# Patient Record
Sex: MALE | Race: WHITE | Employment: UNEMPLOYED | URBAN - METROPOLITAN AREA
[De-identification: names, ages, dates, MRNs, and addresses within clinical notes are randomized per-mention and may not be internally consistent; named-entity substitution may affect disease eponyms.]

---

## 2017-01-09 RX ORDER — SIMVASTATIN 40 MG
TABLET ORAL
Qty: 15 TABLET | Refills: 2 | Status: SHIPPED | OUTPATIENT
Start: 2017-01-09 | End: 2017-06-10

## 2017-02-06 RX ORDER — DULOXETIN HYDROCHLORIDE 60 MG/1
CAPSULE, DELAYED RELEASE ORAL
Qty: 30 CAPSULE | Refills: 2 | Status: SHIPPED | OUTPATIENT
Start: 2017-02-06 | End: 2017-05-27

## 2017-02-06 NOTE — TELEPHONE ENCOUNTER
Last OV pertinent to medication: 9/20/16 for med check  Last refill date: 11/7/16     #/refills: #30 + 2   When pt was asked to return for OV: NA  Upcoming appt/reason: No future appt

## 2017-03-08 ENCOUNTER — OFFICE VISIT (OUTPATIENT)
Dept: INTERNAL MEDICINE CLINIC | Facility: CLINIC | Age: 57
End: 2017-03-08

## 2017-03-08 VITALS
WEIGHT: 315 LBS | DIASTOLIC BLOOD PRESSURE: 70 MMHG | HEIGHT: 71 IN | BODY MASS INDEX: 44.1 KG/M2 | OXYGEN SATURATION: 98 % | HEART RATE: 97 BPM | RESPIRATION RATE: 16 BRPM | SYSTOLIC BLOOD PRESSURE: 130 MMHG

## 2017-03-08 DIAGNOSIS — E66.01 MORBID OBESITY DUE TO EXCESS CALORIES (HCC): Primary | ICD-10-CM

## 2017-03-08 DIAGNOSIS — M17.0 PRIMARY OSTEOARTHRITIS OF BOTH KNEES: ICD-10-CM

## 2017-03-08 PROCEDURE — 99213 OFFICE O/P EST LOW 20 MIN: CPT | Performed by: INTERNAL MEDICINE

## 2017-03-08 NOTE — PROGRESS NOTES
Allan Sewell is a 61year old male.   HPI:   CC: knee pain ongoing  Cannot have bariatric surgery  till he loses weight  Using topical diclofenac with some relief  Walking with a cane      ALL:  Erythromycin                Comment:Pt states felt \"tingling hyperlipidemia    • Hypertension    • Morbid obesity ([facility] [address]    • Renal insufficiency, mild July 2010     Was hospitalized due to dehydration and had acute renal insufficiency (creatinine 1.85)   • Disc herniation Dx in 1/2012     L5-S1 disc herniation after murmur,  LUNGS: clear to auscultation, breathing nonlabored   GI: Soft+BS NT ND,no masses, no HSM, no abdominal bruit , no hernia,   MUSCULOSKELETAL:  no cyanosis, clubbing or edema b/l, 2+DP/PT pulse bilateral,+ tender right knee greater than left  [employer]

## 2017-03-11 RX ORDER — LISINOPRIL 2.5 MG/1
TABLET ORAL
Qty: 30 TABLET | Refills: 2 | Status: SHIPPED | OUTPATIENT
Start: 2017-03-11 | End: 2017-06-10

## 2017-03-11 RX ORDER — FUROSEMIDE 20 MG/1
TABLET ORAL
Qty: 30 TABLET | Refills: 3 | Status: SHIPPED | OUTPATIENT
Start: 2017-03-11 | End: 2017-10-07

## 2017-05-30 RX ORDER — DULOXETIN HYDROCHLORIDE 60 MG/1
CAPSULE, DELAYED RELEASE ORAL
Qty: 30 CAPSULE | Refills: 2 | Status: SHIPPED | OUTPATIENT
Start: 2017-05-30 | End: 2017-09-02

## 2017-05-30 NOTE — TELEPHONE ENCOUNTER
Last OV pertinent to medication: 3/8/17  Last refill date: 2/6/17     #/refills: 30 tabs + 2 refills   When pt was asked to return for OV: No mention when follow up is needed   Upcoming appt/reason: No upcoming appt

## 2017-06-12 RX ORDER — SIMVASTATIN 40 MG
TABLET ORAL
Qty: 15 TABLET | Refills: 1 | Status: SHIPPED | OUTPATIENT
Start: 2017-06-12 | End: 2017-10-25

## 2017-06-12 RX ORDER — LISINOPRIL 2.5 MG/1
TABLET ORAL
Qty: 30 TABLET | Refills: 1 | Status: SHIPPED | OUTPATIENT
Start: 2017-06-12 | End: 2017-09-02

## 2017-08-28 ENCOUNTER — TELEPHONE (OUTPATIENT)
Dept: INTERNAL MEDICINE CLINIC | Facility: CLINIC | Age: 57
End: 2017-08-28

## 2017-09-05 RX ORDER — LISINOPRIL 2.5 MG/1
TABLET ORAL
Qty: 30 TABLET | Refills: 0 | Status: SHIPPED | OUTPATIENT
Start: 2017-09-05 | End: 2017-10-07

## 2017-09-05 RX ORDER — ASPIRIN 81 MG/1
TABLET ORAL
Qty: 30 TABLET | Refills: 2 | Status: SHIPPED | OUTPATIENT
Start: 2017-09-05 | End: 2018-05-05

## 2017-09-05 RX ORDER — DULOXETIN HYDROCHLORIDE 60 MG/1
CAPSULE, DELAYED RELEASE ORAL
Qty: 30 CAPSULE | Refills: 1 | Status: SHIPPED | OUTPATIENT
Start: 2017-09-05 | End: 2017-11-04

## 2017-09-05 NOTE — TELEPHONE ENCOUNTER
Last OV pertinent to medication: 3/8/17  Last refill date: duloxetine:  5/30/17  30 caps + 2 refills              asprin:       10/24/16   30 tabs + 5 refills   When pt was asked to return for OV: no upcoming appt   Upcoming appt/reason: NO mention when fo

## 2017-09-22 ENCOUNTER — PATIENT OUTREACH (OUTPATIENT)
Dept: INTERNAL MEDICINE CLINIC | Facility: CLINIC | Age: 57
End: 2017-09-22

## 2017-09-22 NOTE — PROGRESS NOTES
Called patient, Left Message and verbalized that Patient is Overdue for a DM Eye Exam for 2016/2017. If Completed already to call back with info or to call when Exam is done.

## 2017-10-09 RX ORDER — FUROSEMIDE 20 MG/1
TABLET ORAL
Qty: 90 TABLET | Refills: 1 | Status: SHIPPED | OUTPATIENT
Start: 2017-10-09 | End: 2018-06-08

## 2017-10-09 RX ORDER — LISINOPRIL 2.5 MG/1
TABLET ORAL
Qty: 90 TABLET | Refills: 1 | Status: SHIPPED | OUTPATIENT
Start: 2017-10-09 | End: 2018-03-12

## 2017-10-09 NOTE — TELEPHONE ENCOUNTER
Last OV pertinent to medication: 03/08/17  Last refill date: 09/05/17; 03/11/17     #/refills: 30/0; 30/0  When pt was asked to return for OV: None noted  Upcoming appt/reason: None    Lab Results  Component Value Date    (H) 09/20/2016   BUN 27 (H)

## 2017-10-23 ENCOUNTER — MED REC SCAN ONLY (OUTPATIENT)
Dept: INTERNAL MEDICINE CLINIC | Facility: CLINIC | Age: 57
End: 2017-10-23

## 2017-10-26 RX ORDER — SIMVASTATIN 40 MG
TABLET ORAL
Qty: 15 TABLET | Refills: 0 | Status: SHIPPED | OUTPATIENT
Start: 2017-10-26 | End: 2017-12-02

## 2017-10-26 NOTE — TELEPHONE ENCOUNTER
Last OV relevant to medication: 3/8/17  Last refill date: 6/12/17     #/refills: 15/1  When pt was asked to return for OV: none noted  Upcoming appt/reason: none    Lab Results  Component Value Date    (H) 09/20/2016   BUN 27 (H) 09/20/2016   JOVAN

## 2017-10-27 NOTE — TELEPHONE ENCOUNTER
Last OV relevant to medication: 3/8/17  Last refill date: 8/7/17     #/refills: 400g/1  When pt was asked to return for OV: none noted  Upcoming appt/reason: none

## 2017-10-30 NOTE — TELEPHONE ENCOUNTER
Left message on patients answering machine to call back. Patient is due for an eye exam. Last eye exam documented is from 2013.

## 2017-11-01 ENCOUNTER — PATIENT MESSAGE (OUTPATIENT)
Dept: INTERNAL MEDICINE CLINIC | Facility: CLINIC | Age: 57
End: 2017-11-01

## 2017-11-01 NOTE — TELEPHONE ENCOUNTER
From: Melania Form  To: Paradise Horton DO  Sent: 11/1/2017 10:37 AM CDT  Subject: Prescription Question    [place] has sent over 3 requests to refill diclofenac Since last week.  Please fill the prescription  Thanks  Adela Bolden

## 2017-11-01 NOTE — TELEPHONE ENCOUNTER
Pt mycharted back and said he is having done before the end of year.   Has to check schedule and call for OV for eye exam.

## 2017-11-02 ENCOUNTER — PATIENT MESSAGE (OUTPATIENT)
Dept: INTERNAL MEDICINE CLINIC | Facility: CLINIC | Age: 57
End: 2017-11-02

## 2017-11-02 NOTE — TELEPHONE ENCOUNTER
From: Violeta Tello  To: Kolton Saldivar DO  Sent: 11/2/2017 10:43 AM CDT  Subject: Prescription Question    Still waiting on the diclofenac refill  Newt Birch

## 2017-11-06 RX ORDER — DULOXETIN HYDROCHLORIDE 60 MG/1
CAPSULE, DELAYED RELEASE ORAL
Qty: 30 CAPSULE | Refills: 1 | Status: SHIPPED | OUTPATIENT
Start: 2017-11-06 | End: 2018-01-08

## 2017-11-06 NOTE — TELEPHONE ENCOUNTER
Last OV relevant to medication: 3/8/17  Last refill date: 9/5/17     #/refills: 30/1  When pt was asked to return for OV: none noted  Upcoming appt/reason: none

## 2017-11-08 ENCOUNTER — TELEPHONE (OUTPATIENT)
Dept: INTERNAL MEDICINE CLINIC | Facility: CLINIC | Age: 57
End: 2017-11-08

## 2017-11-08 NOTE — TELEPHONE ENCOUNTER
Received fax  [employer] re:need to initiate Prior Auth for Dispensing Limit Override for Diclofenac. To Triage bin.

## 2017-11-09 NOTE — TELEPHONE ENCOUNTER
Initiated Prior Authorization for dispensing Limit Override fro Diclofenac---Key : [facility]. To Triage bin. Await response.

## 2017-11-11 ENCOUNTER — TELEPHONE (OUTPATIENT)
Dept: INTERNAL MEDICINE CLINIC | Facility: CLINIC | Age: 57
End: 2017-11-11

## 2017-11-11 NOTE — TELEPHONE ENCOUNTER
Incoming (mail or fax):   Fax  Received from:  [facility] - rejection information - Diclofenac Sodium 1% Gel  Documentation given to:  Triage

## 2017-11-13 ENCOUNTER — OFFICE VISIT (OUTPATIENT)
Dept: INTERNAL MEDICINE CLINIC | Facility: CLINIC | Age: 57
End: 2017-11-13

## 2017-11-13 ENCOUNTER — PATIENT MESSAGE (OUTPATIENT)
Dept: INTERNAL MEDICINE CLINIC | Facility: CLINIC | Age: 57
End: 2017-11-13

## 2017-11-13 VITALS
TEMPERATURE: 97 F | BODY MASS INDEX: 44.1 KG/M2 | SYSTOLIC BLOOD PRESSURE: 126 MMHG | HEART RATE: 80 BPM | DIASTOLIC BLOOD PRESSURE: 72 MMHG | WEIGHT: 315 LBS | HEIGHT: 71 IN | RESPIRATION RATE: 16 BRPM

## 2017-11-13 DIAGNOSIS — J01.00 ACUTE MAXILLARY SINUSITIS, RECURRENCE NOT SPECIFIED: Primary | ICD-10-CM

## 2017-11-13 PROCEDURE — 99213 OFFICE O/P EST LOW 20 MIN: CPT | Performed by: INTERNAL MEDICINE

## 2017-11-13 RX ORDER — LEVOFLOXACIN 500 MG/1
500 TABLET, FILM COATED ORAL DAILY
Qty: 7 TABLET | Refills: 0 | Status: SHIPPED | OUTPATIENT
Start: 2017-11-13 | End: 2017-12-18 | Stop reason: ALTCHOICE

## 2017-11-13 NOTE — TELEPHONE ENCOUNTER
Prior auth for diclofenac gel rejected by insurance. Max qty of 150.00 in 23 days. Do you want to change qty or rx?

## 2017-11-13 NOTE — TELEPHONE ENCOUNTER
Tubes only come in 100g size, cannot break up tube to dispense insurance's required qty. Pharmacist was able to change rx to dispense 100g for 10 day supply w/refills.

## 2017-11-13 NOTE — TELEPHONE ENCOUNTER
From: Violeta Tello  To: Kolton Saldivar DO  Sent: 11/13/2017 1:17 PM CST  Subject: Other    How were you able to get it authorized last year for the 400 grams per month ? the insurance had the same guidelines then. can you please check into this.  One tube does

## 2017-11-13 NOTE — TELEPHONE ENCOUNTER
Let pt know this  Not sure why rejected  He has been using this dose for few years  Please pend what is approved

## 2017-11-13 NOTE — TELEPHONE ENCOUNTER
Dharmesh Morley (Key: [facility])    Your information has been submitted to [employer]. [employer] is reviewing the PA request and you will receive an electronic response.  You may check for the updated outcome later by reopening this request. The standard fax de

## 2017-11-13 NOTE — TELEPHONE ENCOUNTER
From: Dina Meeks  To: Luanne Mckinnon DO  Sent: 11/13/2017 12:11 PM CST  Subject: Other    I just left your office. Was told I need to speak to you. Called [employer] today and they still have not received the authorization request for diclofenac.  Latricia Norton been kacy

## 2017-11-13 NOTE — TELEPHONE ENCOUNTER
Not at this time. I re-submitted to insurance (prior auth) since the qty is way too small for QID both knees. Will message you if need further assistance. Thanks!

## 2017-11-13 NOTE — PROGRESS NOTES
[facility]    CHIEF COMPLAINT:  Patient presents with:  Sinus Problem: sx for 1 week. Taking Mucinex and tylenol prn. Has appt for eye exam on 11/27/17.   Form given to pt.    has not had a colonoscopy        HISTORY OF PRESENT ILLNESS:  Compla ONETOUCH ULTRA BLUE In Vitro Strip USE TO TEST 4 TIMES A DAY AS DIRECTED Disp: 400 strip Rfl: 3   Diclofenac Sodium 1 % Transdermal Gel APPLY 4 GRAMS TO BOTH KNEES 4 TIMES A DAY AS NEEDED Disp: 400 g Rfl: 1       PAST MEDICAL, SOCIAL, AND FAMILY HISTORY:

## 2017-11-16 NOTE — TELEPHONE ENCOUNTER
Received fax from [employer] re: Diclofenac sodium gel 1 % has been approved, for quantity up to 400 g/month, granted 11/9/17 through 11/9/18. Case # [id number].

## 2017-11-16 NOTE — TELEPHONE ENCOUNTER
Prescription drug approval    Incoming (mail or fax):  fax  Received from:  [employer]  Documentation given to:  triage

## 2017-11-17 NOTE — TELEPHONE ENCOUNTER
Pt had communicated regarding this medication through [employer]. msg sent through [employer] on original email thread 11/13/17. Re-sent rx to pharmacy so it will go through insurance.

## 2017-12-04 RX ORDER — SIMVASTATIN 40 MG
TABLET ORAL
Qty: 15 TABLET | Refills: 0 | Status: SHIPPED | OUTPATIENT
Start: 2017-12-04 | End: 2018-03-10

## 2017-12-04 NOTE — TELEPHONE ENCOUNTER
Last OV relevant to medication: 09/20/2017  Last refill date: 10/26/2017     #/refills: 15/0  When pt was asked to return for OV: none noted  Upcoming appt/reason: none    Lab Results  Component Value Date   CHOLEST 145 09/20/2016   TRIG 127 09/20/2016   H

## 2017-12-07 PROCEDURE — 82570 ASSAY OF URINE CREATININE: CPT | Performed by: INTERNAL MEDICINE

## 2017-12-07 PROCEDURE — 82043 UR ALBUMIN QUANTITATIVE: CPT | Performed by: INTERNAL MEDICINE

## 2017-12-18 ENCOUNTER — OFFICE VISIT (OUTPATIENT)
Dept: INTERNAL MEDICINE CLINIC | Facility: CLINIC | Age: 57
End: 2017-12-18

## 2017-12-18 VITALS
HEIGHT: 71 IN | WEIGHT: 315 LBS | HEART RATE: 82 BPM | RESPIRATION RATE: 18 BRPM | SYSTOLIC BLOOD PRESSURE: 124 MMHG | BODY MASS INDEX: 44.1 KG/M2 | TEMPERATURE: 98 F | OXYGEN SATURATION: 99 % | DIASTOLIC BLOOD PRESSURE: 62 MMHG

## 2017-12-18 DIAGNOSIS — J01.90 ACUTE SINUSITIS, RECURRENCE NOT SPECIFIED, UNSPECIFIED LOCATION: Primary | ICD-10-CM

## 2017-12-18 PROCEDURE — 99213 OFFICE O/P EST LOW 20 MIN: CPT | Performed by: INTERNAL MEDICINE

## 2017-12-18 RX ORDER — LEVOFLOXACIN 500 MG/1
500 TABLET, FILM COATED ORAL DAILY
Qty: 10 TABLET | Refills: 0 | Status: SHIPPED | OUTPATIENT
Start: 2017-12-18 | End: 2018-01-03 | Stop reason: ALTCHOICE

## 2017-12-18 NOTE — PROGRESS NOTES
John Paul Espinal is a 61year old male  Patient presents with:  Nasal Congestion  Runny Nose  Cough  Sore Throat  Chest Congestion      HPI:   He had a sinus infection 1 month ago and was a lot worse and doesn't want this one to get that bad  URI for 1 week an Disp: 400 strip Rfl: 3      Patient Active Problem List:     Morbid obesity ([facility] [address]     Sleep Apnea  AHI 78     Scalp laceration     Disc herniation     Renal insufficiency, mild     Sleep apnea, obstructive     DJD (degenerative joint disease) of knee     A Resp 18   Ht 71\"   Wt (!) 436 lb   SpO2 99%   BMI 60.81 kg/m²   HEENT: atraumatic, normocephalic,ears and throat are clear, no pallor or icterus  NECK: supple,no adenopathy,no bruits, no TM  LUNGS: clear to auscultation and percussion      ASSESSMENT AND

## 2018-01-03 ENCOUNTER — HOSPITAL ENCOUNTER (EMERGENCY)
Facility: HOSPITAL | Age: 58
Discharge: HOME | End: 2018-01-03
Attending: EMERGENCY MEDICINE
Payer: COMMERCIAL

## 2018-01-03 ENCOUNTER — APPOINTMENT (OUTPATIENT)
Dept: CT IMAGING | Facility: HOSPITAL | Age: 58
End: 2018-01-03
Attending: EMERGENCY MEDICINE
Payer: COMMERCIAL

## 2018-01-03 VITALS
DIASTOLIC BLOOD PRESSURE: 68 MMHG | OXYGEN SATURATION: 98 % | HEIGHT: 71 IN | SYSTOLIC BLOOD PRESSURE: 128 MMHG | WEIGHT: 315 LBS | TEMPERATURE: 98 F | RESPIRATION RATE: 18 BRPM | BODY MASS INDEX: 44.1 KG/M2 | HEART RATE: 82 BPM

## 2018-01-03 DIAGNOSIS — S39.012A BACK STRAIN, INITIAL ENCOUNTER: Primary | ICD-10-CM

## 2018-01-03 LAB
ALBUMIN SERPL-MCNC: 3.7 G/DL (ref 3.5–4.8)
ALP LIVER SERPL-CCNC: 79 U/L (ref 45–117)
ALT SERPL-CCNC: 18 U/L (ref 17–63)
AST SERPL-CCNC: 19 U/L (ref 15–41)
BASOPHILS # BLD AUTO: 0.04 X10(3) UL (ref 0–0.1)
BASOPHILS NFR BLD AUTO: 0.5 %
BILIRUB SERPL-MCNC: 0.6 MG/DL (ref 0.1–2)
BILIRUB UR QL STRIP.AUTO: NEGATIVE
BUN BLD-MCNC: 29 MG/DL (ref 8–20)
CALCIUM BLD-MCNC: 9.7 MG/DL (ref 8.3–10.3)
CHLORIDE: 103 MMOL/L (ref 101–111)
CLARITY UR REFRACT.AUTO: CLEAR
CO2: 25 MMOL/L (ref 22–32)
CREAT BLD-MCNC: 1.14 MG/DL (ref 0.7–1.3)
EOSINOPHIL # BLD AUTO: 0.11 X10(3) UL (ref 0–0.3)
EOSINOPHIL NFR BLD AUTO: 1.3 %
ERYTHROCYTE [DISTWIDTH] IN BLOOD BY AUTOMATED COUNT: 13.3 % (ref 11.5–16)
GLUCOSE BLD-MCNC: 116 MG/DL (ref 70–99)
GLUCOSE BLD-MCNC: 84 MG/DL (ref 65–99)
GLUCOSE BLD-MCNC: 91 MG/DL (ref 65–99)
GLUCOSE UR STRIP.AUTO-MCNC: NEGATIVE MG/DL
HCT VFR BLD AUTO: 40.3 % (ref 37–53)
HGB BLD-MCNC: 13.6 G/DL (ref 13–17)
IMMATURE GRANULOCYTE COUNT: 0.04 X10(3) UL (ref 0–1)
IMMATURE GRANULOCYTE RATIO %: 0.5 %
KETONES UR STRIP.AUTO-MCNC: NEGATIVE MG/DL
LEUKOCYTE ESTERASE UR QL STRIP.AUTO: NEGATIVE
LYMPHOCYTES # BLD AUTO: 2.55 X10(3) UL (ref 0.9–4)
LYMPHOCYTES NFR BLD AUTO: 30.1 %
M PROTEIN MFR SERPL ELPH: 7.9 G/DL (ref 6.1–8.3)
MCH RBC QN AUTO: 29.1 PG (ref 27–33.2)
MCHC RBC AUTO-ENTMCNC: 33.7 G/DL (ref 31–37)
MCV RBC AUTO: 86.3 FL (ref 80–99)
MONOCYTES # BLD AUTO: 0.66 X10(3) UL (ref 0.1–0.6)
MONOCYTES NFR BLD AUTO: 7.8 %
NEUTROPHIL ABS PRELIM: 5.08 X10 (3) UL (ref 1.3–6.7)
NEUTROPHILS # BLD AUTO: 5.08 X10(3) UL (ref 1.3–6.7)
NEUTROPHILS NFR BLD AUTO: 59.8 %
NITRITE UR QL STRIP.AUTO: NEGATIVE
PH UR STRIP.AUTO: 7 [PH] (ref 4.5–8)
PLATELET # BLD AUTO: 283 10(3)UL (ref 150–450)
POTASSIUM SERPL-SCNC: 4.7 MMOL/L (ref 3.6–5.1)
PROT UR STRIP.AUTO-MCNC: NEGATIVE MG/DL
RBC # BLD AUTO: 4.67 X10(6)UL (ref 4.3–5.7)
RBC UR QL AUTO: NEGATIVE
RED CELL DISTRIBUTION WIDTH-SD: 41.4 FL (ref 35.1–46.3)
SODIUM SERPL-SCNC: 136 MMOL/L (ref 136–144)
SP GR UR STRIP.AUTO: 1.01 (ref 1–1.03)
UROBILINOGEN UR STRIP.AUTO-MCNC: <2 MG/DL
WBC # BLD AUTO: 8.5 X10(3) UL (ref 4–13)

## 2018-01-03 PROCEDURE — 96375 TX/PRO/DX INJ NEW DRUG ADDON: CPT

## 2018-01-03 PROCEDURE — 96361 HYDRATE IV INFUSION ADD-ON: CPT

## 2018-01-03 PROCEDURE — 99284 EMERGENCY DEPT VISIT MOD MDM: CPT

## 2018-01-03 PROCEDURE — 96374 THER/PROPH/DIAG INJ IV PUSH: CPT

## 2018-01-03 PROCEDURE — 81003 URINALYSIS AUTO W/O SCOPE: CPT | Performed by: EMERGENCY MEDICINE

## 2018-01-03 PROCEDURE — 74176 CT ABD & PELVIS W/O CONTRAST: CPT | Performed by: EMERGENCY MEDICINE

## 2018-01-03 PROCEDURE — 82962 GLUCOSE BLOOD TEST: CPT

## 2018-01-03 PROCEDURE — 85025 COMPLETE CBC W/AUTO DIFF WBC: CPT | Performed by: EMERGENCY MEDICINE

## 2018-01-03 PROCEDURE — 80053 COMPREHEN METABOLIC PANEL: CPT | Performed by: EMERGENCY MEDICINE

## 2018-01-03 RX ORDER — ONDANSETRON 2 MG/ML
4 INJECTION INTRAMUSCULAR; INTRAVENOUS ONCE
Status: COMPLETED | OUTPATIENT
Start: 2018-01-03 | End: 2018-01-03

## 2018-01-03 RX ORDER — TRAMADOL HYDROCHLORIDE 50 MG/1
50 TABLET ORAL EVERY 4 HOURS PRN
Qty: 20 TABLET | Refills: 0 | Status: SHIPPED | OUTPATIENT
Start: 2018-01-03 | End: 2018-01-10

## 2018-01-03 RX ORDER — HYDROMORPHONE HYDROCHLORIDE 1 MG/ML
1 INJECTION, SOLUTION INTRAMUSCULAR; INTRAVENOUS; SUBCUTANEOUS ONCE
Status: COMPLETED | OUTPATIENT
Start: 2018-01-03 | End: 2018-01-03

## 2018-01-03 RX ORDER — CYCLOBENZAPRINE HCL 10 MG
10 TABLET ORAL 3 TIMES DAILY PRN
Qty: 20 TABLET | Refills: 0 | Status: SHIPPED | OUTPATIENT
Start: 2018-01-03 | End: 2018-01-10

## 2018-01-03 NOTE — ED PROVIDER NOTES
Patient Seen in: [facility] Emergency Department    History   Patient presents with:  Back Pain (musculoskeletal)    Stated Complaint: back pain    HPI    This is a 21-year-old male who arrives here with complaints of back pain,. The patient.   The at age 35    Type 2 DM       Past Surgical History:  No date: OTHER SURGICAL HISTORY      Comment: Root canals        Smoking status: Never Smoker                                                              Smokeless tobacco: Never Used Glucose 116 (*)     BUN 29 (*)     All other components within normal limits   CBC W/ DIFFERENTIAL - Abnormal; Notable for the following:     Monocyte Absolute 0.66 (*)     All other components within normal limits   POCT GLUCOSE - Normal   POCT GLUCOSE There is a probable cyst arising from the medial upper pole of the right kidney. No evidence of hydronephrosis or renal stones present. ADRENALS:  No mass or enlargement. LIVER:  No enlargement, atrophy, abnormal density, or significant focal lesion.   BI close follow-up.       Disposition and Plan     Clinical Impression:  Back strain, initial encounter  (primary encounter diagnosis)    Disposition:  Discharge  1/3/2018  1:10 pm    Follow-up:  Romario Chambers DO  [address] [address]

## 2018-01-08 RX ORDER — DULOXETIN HYDROCHLORIDE 60 MG/1
CAPSULE, DELAYED RELEASE ORAL
Qty: 30 CAPSULE | Refills: 2 | Status: SHIPPED | OUTPATIENT
Start: 2018-01-08 | End: 2018-04-09

## 2018-01-08 NOTE — TELEPHONE ENCOUNTER
Last OV relevant to medication: 3/8/17, seen acutely multiple times since  Last refill date: 11/6/17     #/refills: 30/1  When pt was asked to return for OV: none noted  Upcoming appt/reason: none

## 2018-01-15 NOTE — TELEPHONE ENCOUNTER
Last OV relevant to medication: 9/20/2016 - Med cira  Last refill date: 11/17/2017     #/refills: 400g/1  When pt was asked to return for OV: none noted  Upcoming appt/reason: none

## 2018-02-20 NOTE — TELEPHONE ENCOUNTER
Medication(s) to Refill:   Pending Prescriptions Disp Refills    DICLOFENAC SODIUM 1 % Transdermal Gel [Pharmacy Med Name: Diclofenac Sodium Transdermal Gel 1 %] 400 g 0     Sig: apply 4 grams to both knees 4 times daily as needed           Last Time Medication was Filled:  1/15/18    Last Office Visit with PCP:  3/8/17    When Patient was Due Back to the Office:  (from when PCP last addressed medication)  [] 1 month,  [] 3 months,  [] 6 months,  [] 12 months,  [x] Other       Future Appointments  Date Time Provider Ritika Villalpando   3/8/2018 9:40 AM Fabio Loya MD [address]        [] Prescription needs to be printed at site and faxed to pharmacy  []Controlled Substance, prescription needs to be printed at the site, site to notify patient when ready  Unable to Refill per Protocol:   [] Office visit due (90 day supply prescription extension has already been granted)   [] Blood Pressure out of range   [] Labs Overdue  []  Medication has never been prescribed by [facility] provider   [x] Other     Last Blood Pressures:  BP Readings from Last 2 Encounters:  01/03/18 : 128/68  12/18/17 : 124/62

## 2018-03-12 RX ORDER — LISINOPRIL 2.5 MG/1
2.5 TABLET ORAL
Qty: 90 TABLET | Refills: 1 | Status: SHIPPED | OUTPATIENT
Start: 2018-03-12 | End: 2018-06-08

## 2018-03-12 RX ORDER — SIMVASTATIN 40 MG
TABLET ORAL
Qty: 15 TABLET | Refills: 1 | Status: SHIPPED | OUTPATIENT
Start: 2018-03-12 | End: 2018-06-08

## 2018-03-29 ENCOUNTER — TELEPHONE (OUTPATIENT)
Dept: INTERNAL MEDICINE CLINIC | Facility: CLINIC | Age: 58
End: 2018-03-29

## 2018-03-29 NOTE — TELEPHONE ENCOUNTER
Pt dropped off Parking Placard request form and is requesting renewal with completion of form and signature, and then mail to home. See Media 10/25/17. Last renewal done for 6 months. To consult folder.

## 2018-03-29 NOTE — TELEPHONE ENCOUNTER
Patient dropped off a disability form for his Parking/License Plates. I put in Triage bin to complete. He also wanted us to mail to his home when completed.     Thank you

## 2018-03-29 NOTE — TELEPHONE ENCOUNTER
Pt called, agreed to fee for form completion. Asks if we can mail form to him and bill him for form fee.  Thank you

## 2018-03-29 NOTE — TELEPHONE ENCOUNTER
Left message on pt's voice mail advising that there is a fee for form completion out side of an office visit. Pt asked to call back to confirm that he is agreeable to policy that a fee may be charged.

## 2018-03-29 NOTE — TELEPHONE ENCOUNTER
form completed   Please charge for form completion outside visit  Let pt know I made his disability permanent since he is not a surgical candidate  Please make copy and mail to his house  He needs to sign form

## 2018-03-30 NOTE — TELEPHONE ENCOUNTER
PSR - please charge fee for form completed outside of office visit. Form mailed to pt. Thanks! Copy sent to scan. Original mailed to pt's address on file with note advising of areas pt needs to complete and sign.     Left detailed msg, rajni per hipaa,

## 2018-04-09 ENCOUNTER — TELEPHONE (OUTPATIENT)
Dept: INTERNAL MEDICINE CLINIC | Facility: CLINIC | Age: 58
End: 2018-04-09

## 2018-04-10 RX ORDER — DULOXETIN HYDROCHLORIDE 60 MG/1
60 CAPSULE, DELAYED RELEASE ORAL
Qty: 30 CAPSULE | Refills: 0 | Status: SHIPPED | OUTPATIENT
Start: 2018-04-10 | End: 2018-05-05

## 2018-04-10 NOTE — TELEPHONE ENCOUNTER
Patient called, he is really busy at the moment and cannot schedule a physical at this time.  Please asked if we can fill this medication for a month for him, he said he will call back to schedule a physical. Please advise patient, either call or my chart,

## 2018-04-10 NOTE — TELEPHONE ENCOUNTER
Last OV relevant to medication: 3/8/17, seen acutely several times since  Last refill date: 1/8/18     #/refills: 30/2  When pt was asked to return for OV: 1 yr  Upcoming appt/reason: none, mychart message sent

## 2018-04-11 RX ORDER — DULOXETIN HYDROCHLORIDE 60 MG/1
60 CAPSULE, DELAYED RELEASE ORAL
Qty: 30 CAPSULE | Refills: 0 | OUTPATIENT
Start: 2018-04-11

## 2018-04-11 NOTE — TELEPHONE ENCOUNTER
Patient called back, advised he is due for a medication check, patient asked if RX was filled, advised a 30 day supply was sent to pharmacy yesterday, patient voiced understanding, will call back to schedule apt.

## 2018-04-11 NOTE — TELEPHONE ENCOUNTER
Noted below. Will keep in Phone Call pool to f/u with patient to make sure they schedule an appointment for a med check.

## 2018-05-07 RX ORDER — DULOXETIN HYDROCHLORIDE 60 MG/1
CAPSULE, DELAYED RELEASE ORAL
Qty: 30 CAPSULE | Refills: 0 | Status: SHIPPED | OUTPATIENT
Start: 2018-05-07 | End: 2018-06-08

## 2018-05-07 NOTE — TELEPHONE ENCOUNTER
Last OV relevant to medication: 3/8/17, seen acutely twice   Last refill date: 4/10/18     #/refills: 30/0  When pt was asked to return for OV: none noted   Upcoming appt/reason: none, pt was notified with last refill that he overdue for OV, mychart sent again today

## 2018-10-11 ENCOUNTER — TELEPHONE (OUTPATIENT)
Dept: INTERNAL MEDICINE CLINIC | Facility: CLINIC | Age: 58
End: 2018-10-11

## 2018-10-11 NOTE — TELEPHONE ENCOUNTER
Patient called regarding Phytel reminders he was receiving. Former patient of Johanna Marvin stated he found new PCP does not plan on following here in the practice. Patient requested to be removed for Phytel Reminders (marked op out settings).  Filled out PCP neves

## 2020-07-27 ENCOUNTER — OFFICE VISIT (OUTPATIENT)
Dept: WOUND CARE | Facility: HOSPITAL | Age: 60
End: 2020-07-27
Attending: INTERNAL MEDICINE
Payer: COMMERCIAL

## 2020-07-27 DIAGNOSIS — L98.499 TYPE 2 DIABETES MELLITUS WITH OTHER SKIN ULCER (HCC): ICD-10-CM

## 2020-07-27 DIAGNOSIS — L03.116 CELLULITIS OF LEFT LOWER LIMB: ICD-10-CM

## 2020-07-27 DIAGNOSIS — E11.65 TYPE 2 DIABETES, UNCONTROLLED, WITH NEUROPATHY (HCC): Primary | ICD-10-CM

## 2020-07-27 DIAGNOSIS — E11.622 TYPE 2 DIABETES MELLITUS WITH OTHER SKIN ULCER (HCC): ICD-10-CM

## 2020-07-27 DIAGNOSIS — S91.302A UNSPECIFIED OPEN WOUND, LEFT FOOT, INITIAL ENCOUNTER: ICD-10-CM

## 2020-07-27 DIAGNOSIS — S81.802A UNSPECIFIED OPEN WOUND, LEFT LOWER LEG, INITIAL ENCOUNTER: ICD-10-CM

## 2020-07-27 DIAGNOSIS — S81.801S UNSPECIFIED OPEN WOUND, RIGHT LOWER LEG, SEQUELA: ICD-10-CM

## 2020-07-27 DIAGNOSIS — I89.0 LYMPHEDEMA, NOT ELSEWHERE CLASSIFIED: ICD-10-CM

## 2020-07-27 DIAGNOSIS — E11.40 TYPE 2 DIABETES, UNCONTROLLED, WITH NEUROPATHY (HCC): Primary | ICD-10-CM

## 2020-07-27 LAB — GLUCOSE BLD-MCNC: 116 MG/DL (ref 70–99)

## 2020-07-27 PROCEDURE — 97598 DBRDMT OPN WND ADDL 20CM/<: CPT

## 2020-07-27 PROCEDURE — 97597 DBRDMT OPN WND 1ST 20 CM/<: CPT

## 2020-07-27 PROCEDURE — 29581 APPL MULTLAYER CMPRN SYS LEG: CPT

## 2020-07-27 PROCEDURE — 99215 OFFICE O/P EST HI 40 MIN: CPT

## 2020-07-27 PROCEDURE — 82962 GLUCOSE BLOOD TEST: CPT

## 2020-07-27 NOTE — PROGRESS NOTES
Chief Complaint  This information was obtained from the patient  Patient is here for an initial exam for bilateral lower leg wounds. He states he continues taking antibiotics twice daily and is leaving his wounds open to air.     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 No tunneling has been noted. No sinus tract has been noted. No undermining has been noted. There is a large amount of serous drainage noted which has no odor. The patient reports a wound pain of level 0/10. The wound margin is well defined. Hemosiderosis. The temperature of the periwound skin is Warm. Periwound skin does not exhibit signs or symptoms of infection. Local Pulse is Weak.   General Notes:  Wounds clustered    Wound #5 Left Toe Third is a Patel Grade 1 Diabetic Ulcer and has recei Pulses:  Dorsalis Pedis: Palpable  Right Extremity Pulses:  Dorsalis Pedis: Palpable  Left Extremity colors, hair growth, and conditions:  Extremity Color: Pigmented  Hair Growth on Extremity: Yes  Temperature of Extremity: Warm  Capillary Refill: < 3 seco throughout and a pain level of 2 following the procedure. Post Debridement Measurements: 5.5cm length x 5.5cm width x 0.2cm depth; with an area of 30.25 sq cm and a volume of 6.05 cubic cm;     Wound #3 (Venous Ulcer) is located on the left, anterior lower wound bed contains fibrin or eschar. 4% Topical Lidocaine    Wound Cleansing & Dressings:  May shower with protection.   Hydrofera Transfer  Kerramax/Super absorbent  Change Dressing Every: - visit    Compression Therapy:  Comprilan  Compression to Right L

## 2020-07-29 ENCOUNTER — OFFICE VISIT (OUTPATIENT)
Dept: WOUND CARE | Facility: HOSPITAL | Age: 60
End: 2020-07-29
Attending: INTERNAL MEDICINE
Payer: COMMERCIAL

## 2020-07-29 DIAGNOSIS — S81.802A UNSPECIFIED OPEN WOUND, LEFT LOWER LEG, INITIAL ENCOUNTER: ICD-10-CM

## 2020-07-29 DIAGNOSIS — S91.302A UNSPECIFIED OPEN WOUND, LEFT FOOT, INITIAL ENCOUNTER: ICD-10-CM

## 2020-07-29 DIAGNOSIS — L98.499 TYPE 2 DIABETES MELLITUS WITH OTHER SKIN ULCER (HCC): Primary | ICD-10-CM

## 2020-07-29 DIAGNOSIS — S81.801A UNSPECIFIED OPEN WOUND, RIGHT LOWER LEG, INITIAL ENCOUNTER: ICD-10-CM

## 2020-07-29 DIAGNOSIS — E11.622 TYPE 2 DIABETES MELLITUS WITH OTHER SKIN ULCER (HCC): Primary | ICD-10-CM

## 2020-07-29 DIAGNOSIS — L03.116 CELLULITIS OF LEFT LOWER LIMB: ICD-10-CM

## 2020-07-29 DIAGNOSIS — I89.0 LYMPHEDEMA, NOT ELSEWHERE CLASSIFIED: ICD-10-CM

## 2020-07-29 LAB — GLUCOSE BLD-MCNC: 126 MG/DL (ref 70–99)

## 2020-07-29 PROCEDURE — 82962 GLUCOSE BLOOD TEST: CPT

## 2020-07-29 PROCEDURE — 29581 APPL MULTLAYER CMPRN SYS LEG: CPT

## 2020-07-31 ENCOUNTER — APPOINTMENT (OUTPATIENT)
Dept: WOUND CARE | Facility: HOSPITAL | Age: 60
End: 2020-07-31
Attending: INTERNAL MEDICINE
Payer: COMMERCIAL

## 2020-08-03 ENCOUNTER — OFFICE VISIT (OUTPATIENT)
Dept: WOUND CARE | Facility: HOSPITAL | Age: 60
End: 2020-08-03
Attending: INTERNAL MEDICINE
Payer: COMMERCIAL

## 2020-08-03 ENCOUNTER — APPOINTMENT (OUTPATIENT)
Dept: LAB | Facility: HOSPITAL | Age: 60
End: 2020-08-03
Attending: INTERNAL MEDICINE
Payer: COMMERCIAL

## 2020-08-03 DIAGNOSIS — S91.302A UNSPECIFIED OPEN WOUND, LEFT FOOT, INITIAL ENCOUNTER: ICD-10-CM

## 2020-08-03 DIAGNOSIS — S81.801A UNSPECIFIED OPEN WOUND, RIGHT LOWER LEG, INITIAL ENCOUNTER: ICD-10-CM

## 2020-08-03 DIAGNOSIS — E11.622 TYPE 2 DIABETES MELLITUS WITH OTHER SKIN ULCER, WITHOUT LONG-TERM CURRENT USE OF INSULIN (HCC): Primary | ICD-10-CM

## 2020-08-03 DIAGNOSIS — L03.116 CELLULITIS OF LEFT LOWER LIMB: ICD-10-CM

## 2020-08-03 DIAGNOSIS — N28.9 RENAL INSUFFICIENCY, MILD: ICD-10-CM

## 2020-08-03 DIAGNOSIS — M17.11 PRIMARY OSTEOARTHRITIS OF RIGHT KNEE: ICD-10-CM

## 2020-08-03 DIAGNOSIS — E11.622 TYPE 2 DIABETES MELLITUS WITH OTHER SKIN ULCER (HCC): ICD-10-CM

## 2020-08-03 DIAGNOSIS — G47.33 SLEEP APNEA, OBSTRUCTIVE: ICD-10-CM

## 2020-08-03 DIAGNOSIS — R00.2 PALPITATION: ICD-10-CM

## 2020-08-03 DIAGNOSIS — L98.499 TYPE 2 DIABETES MELLITUS WITH OTHER SKIN ULCER (HCC): ICD-10-CM

## 2020-08-03 DIAGNOSIS — E11.42 DIABETIC POLYNEUROPATHY ASSOCIATED WITH TYPE 2 DIABETES MELLITUS (HCC): ICD-10-CM

## 2020-08-03 DIAGNOSIS — I89.0 LYMPHEDEMA, NOT ELSEWHERE CLASSIFIED: ICD-10-CM

## 2020-08-03 DIAGNOSIS — Z00.00 ROUTINE GENERAL MEDICAL EXAMINATION AT A HEALTH CARE FACILITY: ICD-10-CM

## 2020-08-03 DIAGNOSIS — S81.802A UNSPECIFIED OPEN WOUND, LEFT LOWER LEG, INITIAL ENCOUNTER: ICD-10-CM

## 2020-08-03 LAB
ALBUMIN SERPL-MCNC: 3.3 G/DL (ref 3.4–5)
ALBUMIN/GLOB SERPL: 0.7 {RATIO} (ref 1–2)
ALP LIVER SERPL-CCNC: 119 U/L (ref 45–117)
ALT SERPL-CCNC: 14 U/L (ref 16–61)
ANION GAP SERPL CALC-SCNC: 1 MMOL/L (ref 0–18)
AST SERPL-CCNC: 21 U/L (ref 15–37)
BILIRUB SERPL-MCNC: 0.8 MG/DL (ref 0.1–2)
BUN BLD-MCNC: 23 MG/DL (ref 7–18)
BUN/CREAT SERPL: 20.4 (ref 10–20)
CALCIUM BLD-MCNC: 9.3 MG/DL (ref 8.5–10.1)
CHLORIDE SERPL-SCNC: 109 MMOL/L (ref 98–112)
CO2 SERPL-SCNC: 26 MMOL/L (ref 21–32)
CREAT BLD-MCNC: 1.13 MG/DL (ref 0.7–1.3)
DEPRECATED RDW RBC AUTO: 51.5 FL (ref 35.1–46.3)
ERYTHROCYTE [DISTWIDTH] IN BLOOD BY AUTOMATED COUNT: 15.7 % (ref 11–15)
EST. AVERAGE GLUCOSE BLD GHB EST-MCNC: 157 MG/DL (ref 68–126)
GLOBULIN PLAS-MCNC: 4.9 G/DL (ref 2.8–4.4)
GLUCOSE BLD-MCNC: 60 MG/DL (ref 70–99)
GLUCOSE BLD-MCNC: 72 MG/DL (ref 70–99)
GLUCOSE BLD-MCNC: 74 MG/DL (ref 70–99)
HBA1C MFR BLD HPLC: 7.1 % (ref ?–5.7)
HCT VFR BLD AUTO: 35.9 % (ref 39–53)
HGB BLD-MCNC: 10.8 G/DL (ref 13–17.5)
M PROTEIN MFR SERPL ELPH: 8.2 G/DL (ref 6.4–8.2)
MCH RBC QN AUTO: 27.3 PG (ref 26–34)
MCHC RBC AUTO-ENTMCNC: 30.1 G/DL (ref 31–37)
MCV RBC AUTO: 90.7 FL (ref 80–100)
OSMOLALITY SERPL CALC.SUM OF ELEC: 284 MOSM/KG (ref 275–295)
PATIENT FASTING Y/N/NP: YES
PLATELET # BLD AUTO: 305 10(3)UL (ref 150–450)
POTASSIUM SERPL-SCNC: 4.9 MMOL/L (ref 3.5–5.1)
PSA SERPL-MCNC: 0.31 NG/ML (ref ?–4)
RBC # BLD AUTO: 3.96 X10(6)UL (ref 4.3–5.7)
SODIUM SERPL-SCNC: 136 MMOL/L (ref 136–145)
T4 FREE SERPL-MCNC: 1.1 NG/DL (ref 0.8–1.7)
TSI SER-ACNC: 6.74 MIU/ML (ref 0.36–3.74)
WBC # BLD AUTO: 8 X10(3) UL (ref 4–11)

## 2020-08-03 PROCEDURE — 82962 GLUCOSE BLOOD TEST: CPT

## 2020-08-03 PROCEDURE — 84439 ASSAY OF FREE THYROXINE: CPT

## 2020-08-03 PROCEDURE — 84153 ASSAY OF PSA TOTAL: CPT

## 2020-08-03 PROCEDURE — 80053 COMPREHEN METABOLIC PANEL: CPT

## 2020-08-03 PROCEDURE — 84443 ASSAY THYROID STIM HORMONE: CPT

## 2020-08-03 PROCEDURE — 83036 HEMOGLOBIN GLYCOSYLATED A1C: CPT

## 2020-08-03 PROCEDURE — 85027 COMPLETE CBC AUTOMATED: CPT

## 2020-08-03 PROCEDURE — 29581 APPL MULTLAYER CMPRN SYS LEG: CPT

## 2020-08-03 PROCEDURE — 36415 COLL VENOUS BLD VENIPUNCTURE: CPT

## 2020-08-03 NOTE — PROGRESS NOTES
Chief Complaint  This information was obtained from the patient  Patient is here for a wound care follow up. He presents without his wraps on, which he removed on Saturday, due to the wraps having fallen. He complains of pain to the legs today.     Allerg unspecified type diabetes mellitus without mention of complication, uncontrolled Dx at age 35  Type 2 DM    Past Surgical History:  Procedure Laterality Date  • OTHER SURGICAL HISTORY  Root canals    Family History  Problem Relation Age of Onset  • Cancer has been noted. No undermining has been noted. There is a small amount of sero-sanguineous drainage noted which has no odor. The patient reports a wound pain of level 5/10. The wound margin is well defined. Wound bed has % pink, firm granulation.   Yolanda Rivera infection. Local Pulse is Weak. Wound #4 Left, Posterior Lower Leg is a Full Thickness Venous Ulcer and has received a status of Not Healed.  Subsequent wound encounter measurements are 0cm length x 0cm width with no measurable depth, with an area of 0 s measurement of 28.5 cm  Right Extremity: Edema is present  Compression Device In Use: Yes  Device Used Correctly: Yes  Compression Device Used: Tubigrip or Tubifast  Calf Measurement 33 cm with right measurement of 55 cm  Ankle Measurement 11 cm with right Anterior Lower Leg    Topicals:  Initial Anesthetic Order: Apply lidocaine to wound bed on all future wound center visits during preparation for physician exam if wound bed contains fibrin or eschar.   4% Topical Lidocaine    Wound Cleansing & Dressings:  M 08/03/2020 08:59:50

## 2020-08-07 ENCOUNTER — APPOINTMENT (OUTPATIENT)
Dept: LAB | Facility: HOSPITAL | Age: 60
End: 2020-08-07
Attending: FAMILY MEDICINE
Payer: COMMERCIAL

## 2020-08-07 ENCOUNTER — OFFICE VISIT (OUTPATIENT)
Dept: WOUND CARE | Facility: HOSPITAL | Age: 60
End: 2020-08-07
Attending: INTERNAL MEDICINE
Payer: COMMERCIAL

## 2020-08-07 DIAGNOSIS — I89.0 LYMPHEDEMA, NOT ELSEWHERE CLASSIFIED: ICD-10-CM

## 2020-08-07 DIAGNOSIS — L98.499 TYPE 2 DIABETES MELLITUS WITH OTHER SKIN ULCER (HCC): ICD-10-CM

## 2020-08-07 DIAGNOSIS — E11.622 TYPE 2 DIABETES MELLITUS WITH OTHER SKIN ULCER (HCC): ICD-10-CM

## 2020-08-07 DIAGNOSIS — S91.302A UNSPECIFIED OPEN WOUND, LEFT FOOT, INITIAL ENCOUNTER: ICD-10-CM

## 2020-08-07 DIAGNOSIS — N28.9 RENAL INSUFFICIENCY, MILD: ICD-10-CM

## 2020-08-07 DIAGNOSIS — S81.801A UNSPECIFIED OPEN WOUND, RIGHT LOWER LEG, INITIAL ENCOUNTER: Primary | ICD-10-CM

## 2020-08-07 DIAGNOSIS — G47.33 SLEEP APNEA, OBSTRUCTIVE: ICD-10-CM

## 2020-08-07 DIAGNOSIS — M17.11 PRIMARY OSTEOARTHRITIS OF RIGHT KNEE: ICD-10-CM

## 2020-08-07 DIAGNOSIS — E11.42 DIABETIC POLYNEUROPATHY ASSOCIATED WITH TYPE 2 DIABETES MELLITUS (HCC): ICD-10-CM

## 2020-08-07 DIAGNOSIS — Z00.00 ROUTINE GENERAL MEDICAL EXAMINATION AT A HEALTH CARE FACILITY: ICD-10-CM

## 2020-08-07 DIAGNOSIS — S81.802A UNSPECIFIED OPEN WOUND, LEFT LOWER LEG, INITIAL ENCOUNTER: ICD-10-CM

## 2020-08-07 DIAGNOSIS — L03.116 CELLULITIS OF LEFT LOWER LIMB: ICD-10-CM

## 2020-08-07 LAB
CREAT UR-SCNC: 127 MG/DL
GLUCOSE BLD-MCNC: 136 MG/DL (ref 70–99)
MICROALBUMIN UR-MCNC: 16.6 MG/DL
MICROALBUMIN/CREAT 24H UR-RTO: 130.7 UG/MG (ref ?–30)

## 2020-08-07 PROCEDURE — 29581 APPL MULTLAYER CMPRN SYS LEG: CPT

## 2020-08-07 PROCEDURE — 82570 ASSAY OF URINE CREATININE: CPT

## 2020-08-07 PROCEDURE — 82043 UR ALBUMIN QUANTITATIVE: CPT

## 2020-08-07 PROCEDURE — 82962 GLUCOSE BLOOD TEST: CPT

## 2020-08-10 ENCOUNTER — OFFICE VISIT (OUTPATIENT)
Dept: WOUND CARE | Facility: HOSPITAL | Age: 60
End: 2020-08-10
Attending: INTERNAL MEDICINE
Payer: COMMERCIAL

## 2020-08-10 DIAGNOSIS — S91.302A UNSPECIFIED OPEN WOUND, LEFT FOOT, INITIAL ENCOUNTER: ICD-10-CM

## 2020-08-10 DIAGNOSIS — E11.622 TYPE 2 DIABETES MELLITUS WITH OTHER SKIN ULCER (HCC): ICD-10-CM

## 2020-08-10 DIAGNOSIS — L03.116 CELLULITIS OF LEFT LOWER LIMB: ICD-10-CM

## 2020-08-10 DIAGNOSIS — S81.802A UNSPECIFIED OPEN WOUND, LEFT LOWER LEG, INITIAL ENCOUNTER: ICD-10-CM

## 2020-08-10 DIAGNOSIS — L98.499 TYPE 2 DIABETES MELLITUS WITH OTHER SKIN ULCER (HCC): ICD-10-CM

## 2020-08-10 DIAGNOSIS — S81.801A UNSPECIFIED OPEN WOUND, RIGHT LOWER LEG, INITIAL ENCOUNTER: Primary | ICD-10-CM

## 2020-08-10 DIAGNOSIS — I89.0 LYMPHEDEMA, NOT ELSEWHERE CLASSIFIED: ICD-10-CM

## 2020-08-10 LAB — GLUCOSE BLD-MCNC: 137 MG/DL (ref 70–99)

## 2020-08-10 PROCEDURE — 82962 GLUCOSE BLOOD TEST: CPT

## 2020-08-10 PROCEDURE — 29581 APPL MULTLAYER CMPRN SYS LEG: CPT

## 2020-08-14 ENCOUNTER — OFFICE VISIT (OUTPATIENT)
Dept: WOUND CARE | Facility: HOSPITAL | Age: 60
End: 2020-08-14
Attending: INTERNAL MEDICINE
Payer: COMMERCIAL

## 2020-08-14 DIAGNOSIS — L97.222 NON-PRESSURE CHRONIC ULCER OF LEFT CALF WITH FAT LAYER EXPOSED (HCC): ICD-10-CM

## 2020-08-14 DIAGNOSIS — S91.302A UNSPECIFIED OPEN WOUND, LEFT FOOT, INITIAL ENCOUNTER: ICD-10-CM

## 2020-08-14 DIAGNOSIS — L98.499 TYPE 2 DIABETES MELLITUS WITH OTHER SKIN ULCER, UNSPECIFIED WHETHER LONG TERM INSULIN USE (HCC): Primary | ICD-10-CM

## 2020-08-14 DIAGNOSIS — E11.622 TYPE 2 DIABETES MELLITUS WITH OTHER SKIN ULCER, UNSPECIFIED WHETHER LONG TERM INSULIN USE (HCC): Primary | ICD-10-CM

## 2020-08-14 DIAGNOSIS — L97.212 NON-PRESSURE CHRONIC ULCER OF RIGHT CALF WITH FAT LAYER EXPOSED (HCC): ICD-10-CM

## 2020-08-14 DIAGNOSIS — I89.0 LYMPHEDEMA, NOT ELSEWHERE CLASSIFIED: ICD-10-CM

## 2020-08-14 LAB — GLUCOSE BLD-MCNC: 139 MG/DL (ref 70–99)

## 2020-08-14 PROCEDURE — 82962 GLUCOSE BLOOD TEST: CPT

## 2020-08-14 PROCEDURE — 29581 APPL MULTLAYER CMPRN SYS LEG: CPT

## 2020-08-14 NOTE — PROGRESS NOTES
Chief Complaint  This information was obtained from the patient  Patient is here for a wound care f/u.  Patients complain pain on the both legs 5/10    Allergies  Erythromycin (Reaction: Tingling sensation)    HPI  This information was obtained from the p insufficiency, mild July 2010  Was hospitalized due to dehydration and had acute renal insufficiency (creatinine 1.85)  • Scalp laceration Dx in 3/2012  He hit his head at work and laceration required 3 staples.   • Shingles  • Shoulder fracture, left  Hair Yes        Objective    Wound Assessment(s)  Wound #1 Right, Anterior Lower Leg is a Full Thickness Venous Ulcer and has received a status of Not Healed. Subsequent wound encounter measurements are 2.9cm length x 1cm width x 0.1cm depth, with an area of 2. is a small amount of sero-sanguineous drainage noted which has no odor. The patient reports a wound pain of level 0/10. The wound margin is well defined. Wound bed has 26-50% epithelialization, 26-50% bright red, pink, spongy granulation.   The periwound sk Respiratory Rate: 16 breaths/min, Blood Pressure: 123/72 mmHg, Capillary Blood Glucose: 139 mg/dl.     Lower Extremity Assessment  Edema Assessment:  Left Extremity: Edema is present  Compression Device In Use: Yes  Device Used Correctly: Yes  Compression D Leg  Avoid prolonged standing in one place. Elevate leg(s)as much as possible.     Wound #2 Right, Posterior Lower Leg    Topicals:  Initial Anesthetic Order: Apply lidocaine to wound bed on all future wound center visits during preparation for physician e contains fibrin or eschar. 4% Topical Lidocaine    Wound Cleansing & Dressings:  May shower with protection. Lexus Collagen  Hydrofera Transfer  Change Dressing Every: - visit    Additional Orders:     Follow-Up Appointments:  Return Appointment in 1 weandrés

## 2020-08-17 ENCOUNTER — OFFICE VISIT (OUTPATIENT)
Dept: WOUND CARE | Facility: HOSPITAL | Age: 60
End: 2020-08-17
Attending: INTERNAL MEDICINE
Payer: COMMERCIAL

## 2020-08-17 DIAGNOSIS — I89.0 LYMPHEDEMA, NOT ELSEWHERE CLASSIFIED: ICD-10-CM

## 2020-08-17 DIAGNOSIS — E11.622 TYPE 2 DIABETES MELLITUS WITH OTHER SKIN ULCER (HCC): ICD-10-CM

## 2020-08-17 DIAGNOSIS — L98.499 TYPE 2 DIABETES MELLITUS WITH OTHER SKIN ULCER (HCC): ICD-10-CM

## 2020-08-17 DIAGNOSIS — S91.302A UNSPECIFIED OPEN WOUND, LEFT FOOT, INITIAL ENCOUNTER: ICD-10-CM

## 2020-08-17 DIAGNOSIS — L97.212 NON-PRESSURE CHRONIC ULCER OF RIGHT CALF WITH FAT LAYER EXPOSED (HCC): Primary | ICD-10-CM

## 2020-08-17 DIAGNOSIS — L97.222 NON-PRESSURE CHRONIC ULCER OF LEFT CALF WITH FAT LAYER EXPOSED (HCC): ICD-10-CM

## 2020-08-17 LAB — GLUCOSE BLD-MCNC: 156 MG/DL (ref 70–99)

## 2020-08-17 PROCEDURE — 82962 GLUCOSE BLOOD TEST: CPT

## 2020-08-17 PROCEDURE — 29581 APPL MULTLAYER CMPRN SYS LEG: CPT

## 2020-08-19 ENCOUNTER — OFFICE VISIT (OUTPATIENT)
Dept: WOUND CARE | Facility: HOSPITAL | Age: 60
End: 2020-08-19
Attending: INTERNAL MEDICINE
Payer: COMMERCIAL

## 2020-08-19 DIAGNOSIS — S91.302A UNSPECIFIED OPEN WOUND, LEFT FOOT, INITIAL ENCOUNTER: ICD-10-CM

## 2020-08-19 DIAGNOSIS — L97.212 NON-PRESSURE CHRONIC ULCER OF RIGHT CALF WITH FAT LAYER EXPOSED (HCC): Primary | ICD-10-CM

## 2020-08-19 DIAGNOSIS — E11.622 TYPE 2 DIABETES MELLITUS WITH OTHER SKIN ULCER (HCC): ICD-10-CM

## 2020-08-19 DIAGNOSIS — L98.499 TYPE 2 DIABETES MELLITUS WITH OTHER SKIN ULCER (HCC): ICD-10-CM

## 2020-08-19 DIAGNOSIS — I89.0 LYMPHEDEMA, NOT ELSEWHERE CLASSIFIED: ICD-10-CM

## 2020-08-19 DIAGNOSIS — L97.222 NON-PRESSURE CHRONIC ULCER OF LEFT CALF WITH FAT LAYER EXPOSED (HCC): ICD-10-CM

## 2020-08-19 LAB — GLUCOSE BLD-MCNC: 175 MG/DL (ref 70–99)

## 2020-08-19 PROCEDURE — 82962 GLUCOSE BLOOD TEST: CPT

## 2020-08-19 PROCEDURE — 29581 APPL MULTLAYER CMPRN SYS LEG: CPT

## 2020-08-21 ENCOUNTER — HOSPITAL ENCOUNTER (OUTPATIENT)
Dept: CV DIAGNOSTICS | Facility: HOSPITAL | Age: 60
Discharge: HOME | End: 2020-08-21
Attending: INTERNAL MEDICINE
Payer: COMMERCIAL

## 2020-08-21 ENCOUNTER — OFFICE VISIT (OUTPATIENT)
Dept: WOUND CARE | Facility: HOSPITAL | Age: 60
End: 2020-08-21
Attending: INTERNAL MEDICINE
Payer: COMMERCIAL

## 2020-08-21 DIAGNOSIS — L97.212 NON-PRESSURE CHRONIC ULCER OF RIGHT CALF WITH FAT LAYER EXPOSED (HCC): ICD-10-CM

## 2020-08-21 DIAGNOSIS — E11.622 TYPE 2 DIABETES MELLITUS WITH OTHER SKIN ULCER, UNSPECIFIED WHETHER LONG TERM INSULIN USE (HCC): Primary | ICD-10-CM

## 2020-08-21 DIAGNOSIS — I89.0 LYMPHEDEMA, NOT ELSEWHERE CLASSIFIED: ICD-10-CM

## 2020-08-21 DIAGNOSIS — R00.2 PALPITATION: ICD-10-CM

## 2020-08-21 DIAGNOSIS — L98.499 TYPE 2 DIABETES MELLITUS WITH OTHER SKIN ULCER, UNSPECIFIED WHETHER LONG TERM INSULIN USE (HCC): Primary | ICD-10-CM

## 2020-08-21 DIAGNOSIS — S91.302A UNSPECIFIED OPEN WOUND, LEFT FOOT, INITIAL ENCOUNTER: ICD-10-CM

## 2020-08-21 DIAGNOSIS — L97.222 NON-PRESSURE CHRONIC ULCER OF LEFT CALF WITH FAT LAYER EXPOSED (HCC): ICD-10-CM

## 2020-08-21 LAB — GLUCOSE BLD-MCNC: 117 MG/DL (ref 70–99)

## 2020-08-21 PROCEDURE — 93227 XTRNL ECG REC<48 HR R&I: CPT | Performed by: INTERNAL MEDICINE

## 2020-08-21 PROCEDURE — 93226 XTRNL ECG REC<48 HR SCAN A/R: CPT | Performed by: INTERNAL MEDICINE

## 2020-08-21 PROCEDURE — 82962 GLUCOSE BLOOD TEST: CPT

## 2020-08-21 PROCEDURE — 29581 APPL MULTLAYER CMPRN SYS LEG: CPT

## 2020-08-21 PROCEDURE — 93225 XTRNL ECG REC<48 HRS REC: CPT | Performed by: INTERNAL MEDICINE

## 2020-08-21 NOTE — PROGRESS NOTES
Chief Complaint  This information was obtained from the patient  Patient is here for a wound care follow up. No more issues since we re-wrapped pt's legs. Complains of a 6/10 sharp pain to the left posterior leg wound.     Allergies  Erythromycin (Reactio of knee 2/2/2012  bilat-received Synvisc-One injections to both knees  • Hypertension  • Lipid screening 2/23/2011  • Mixed hyperlipidemia  • Morbid obesity ([facility] [address]  • Osteoarthritis, localized, knee 10/26/2011  left  • Renal insufficiency, mild July 2010  W Symptoms    General Notes:  negative except HPI - denies fever / increased pain / periwound redness - denies chest pain / Sob    Additional Information  Medication reconciliation completed at today's visit. : Yes        Objective    Wound Assessment(s)  Wo Anterior Lower Leg is a Full Thickness Venous Ulcer and has received a status of Bridged. Subsequent wound encounter measurements are 11cm length x 3.6cm width x 0.1cm depth, with an area of 39.6 sq cm and a volume of 3.96 cubic cm.  No tunneling has been n margin is flat and intact. Wound bed has % epithelialization. The periwound skin exhibited: Edema. The periwound skin did not exhibit: Dry/Scaly, Moist, Maceration, Ecchymosis, Erythema. The temperature of the periwound skin is Warm.  Periwound skin Non-pressure chronic ulcer of right calf with fat layer exposed  (Encounter Diagnosis) B16.089 - Non-pressure chronic ulcer of left calf with fat layer exposed  (Encounter Diagnosis) E11.622 - Type 2 diabetes mellitus with other skin ulcer  (Encounter Diag Leg  Avoid prolonged standing in one place. Elevate leg(s)as much as possible.     Wound #3 Left, Anterior Lower Leg    Topicals:  Initial Anesthetic Order: Apply lidocaine to wound bed on all future wound center visits during preparation for physician eduardo

## 2020-08-24 ENCOUNTER — OFFICE VISIT (OUTPATIENT)
Dept: WOUND CARE | Facility: HOSPITAL | Age: 60
End: 2020-08-24
Attending: INTERNAL MEDICINE
Payer: COMMERCIAL

## 2020-08-24 DIAGNOSIS — E11.622 TYPE 2 DIABETES MELLITUS WITH OTHER SKIN ULCER (HCC): ICD-10-CM

## 2020-08-24 DIAGNOSIS — L97.222 NON-PRESSURE CHRONIC ULCER OF LEFT CALF WITH FAT LAYER EXPOSED (HCC): ICD-10-CM

## 2020-08-24 DIAGNOSIS — S91.302A UNSPECIFIED OPEN WOUND, LEFT FOOT, INITIAL ENCOUNTER: ICD-10-CM

## 2020-08-24 DIAGNOSIS — L98.499 TYPE 2 DIABETES MELLITUS WITH OTHER SKIN ULCER (HCC): ICD-10-CM

## 2020-08-24 DIAGNOSIS — I89.0 LYMPHEDEMA, NOT ELSEWHERE CLASSIFIED: ICD-10-CM

## 2020-08-24 DIAGNOSIS — L97.212 NON-PRESSURE CHRONIC ULCER OF RIGHT CALF WITH FAT LAYER EXPOSED (HCC): Primary | ICD-10-CM

## 2020-08-24 LAB — GLUCOSE BLD-MCNC: 117 MG/DL (ref 70–99)

## 2020-08-24 PROCEDURE — 82962 GLUCOSE BLOOD TEST: CPT

## 2020-08-24 PROCEDURE — 29581 APPL MULTLAYER CMPRN SYS LEG: CPT

## 2020-08-25 NOTE — PROGRESS NOTES
Critical findings on 48 hour holter monitor. Appears to be new onsert atrial fibrillation / atrial flutter. Spoke to [place] (medical assistant) at Dr Ruth Hollis office. Put copy in [facility] reading bin in Cardiologist workroom. [place] was going to let Dr Kym Weaver know.

## 2020-08-28 ENCOUNTER — OFFICE VISIT (OUTPATIENT)
Dept: WOUND CARE | Facility: HOSPITAL | Age: 60
End: 2020-08-28
Attending: INTERNAL MEDICINE
Payer: COMMERCIAL

## 2020-08-28 DIAGNOSIS — I89.0 LYMPHEDEMA, NOT ELSEWHERE CLASSIFIED: ICD-10-CM

## 2020-08-28 DIAGNOSIS — S91.302A UNSPECIFIED OPEN WOUND, LEFT FOOT, INITIAL ENCOUNTER: ICD-10-CM

## 2020-08-28 DIAGNOSIS — L97.212 NON-PRESSURE CHRONIC ULCER OF RIGHT CALF WITH FAT LAYER EXPOSED (HCC): Primary | ICD-10-CM

## 2020-08-28 DIAGNOSIS — L97.222 NON-PRESSURE CHRONIC ULCER OF LEFT CALF WITH FAT LAYER EXPOSED (HCC): ICD-10-CM

## 2020-08-28 DIAGNOSIS — L98.499 TYPE 2 DIABETES MELLITUS WITH OTHER SKIN ULCER (HCC): ICD-10-CM

## 2020-08-28 DIAGNOSIS — E11.622 TYPE 2 DIABETES MELLITUS WITH OTHER SKIN ULCER (HCC): ICD-10-CM

## 2020-08-28 LAB — GLUCOSE BLD-MCNC: 134 MG/DL (ref 70–99)

## 2020-08-28 PROCEDURE — 29581 APPL MULTLAYER CMPRN SYS LEG: CPT

## 2020-08-28 PROCEDURE — 82962 GLUCOSE BLOOD TEST: CPT

## 2020-08-28 NOTE — PROGRESS NOTES
Chief Complaint  This information was obtained from the patient  Patient is here for a wound care follow up. Pt states no new concerns.     Allergies  Erythromycin (Reaction: Tingling sensation)    HPI  This information was obtained from the patient    8/ herniation after slipping on ice.  This was medically managed  • DJD (degenerative joint disease) of knee 2/2/2012  bilat-received Synvisc-One injections to both knees  • Hypertension  • Lipid screening 2/23/2011  • Mixed hyperlipidemia  • Morbid obesity (H 05/31/2018    Review of Systems (ROS)  This information was obtained from the patient    Complaints and Symptoms    General Notes:  negative except HPI - denies fever / increased pain / periwound redness - denies chest pain / Sob    Additional Information Full Thickness Venous Ulcer and has received a status of Bridged. Subsequent wound encounter measurements are 1.6cm length x 3.2cm width x 0.1cm depth, with an area of 5.12 sq cm and a volume of 0.512 cubic cm. No tunneling has been noted.  No sinus tract h ulcer  (Encounter Diagnosis) S91.302A - Unspecified open wound, left foot, initial encounter  (Encounter Diagnosis) I89.0 - Lymphedema, not elsewhere classified        Procedures    Wound #1  Wound #1 (Venous Ulcer) is located on the right, anterior lower preparation for physician exam if wound bed contains fibrin or eschar. 4% Topical Lidocaine    Wound Cleansing & Dressings:  May shower with protection.   Endoform Collagen  Hydrofera Transfer  Kerramax/Super absorbent  Change Dressing Every: - visit    Co

## 2020-08-30 ENCOUNTER — APPOINTMENT (OUTPATIENT)
Dept: GENERAL RADIOLOGY | Facility: HOSPITAL | Age: 60
DRG: 871 | End: 2020-08-30
Attending: EMERGENCY MEDICINE
Payer: COMMERCIAL

## 2020-08-30 ENCOUNTER — APPOINTMENT (OUTPATIENT)
Dept: CV DIAGNOSTICS | Facility: HOSPITAL | Age: 60
DRG: 871 | End: 2020-08-30
Attending: INTERNAL MEDICINE
Payer: COMMERCIAL

## 2020-08-30 ENCOUNTER — HOSPITAL ENCOUNTER (INPATIENT)
Facility: HOSPITAL | Age: 60
LOS: 11 days | Discharge: NURSING, REHAB OR LONG-TERM CARE | DRG: 871 | End: 2020-09-10
Attending: EMERGENCY MEDICINE | Admitting: HOSPITALIST
Payer: COMMERCIAL

## 2020-08-30 ENCOUNTER — APPOINTMENT (OUTPATIENT)
Dept: CT IMAGING | Facility: HOSPITAL | Age: 60
DRG: 871 | End: 2020-08-30
Attending: EMERGENCY MEDICINE
Payer: COMMERCIAL
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
## 2020-08-30 DIAGNOSIS — I95.9 HYPOTENSION, UNSPECIFIED HYPOTENSION TYPE: ICD-10-CM

## 2020-08-30 DIAGNOSIS — N17.9 ACUTE KIDNEY INJURY (HCC): ICD-10-CM

## 2020-08-30 DIAGNOSIS — E87.5 HYPERKALEMIA: ICD-10-CM

## 2020-08-30 DIAGNOSIS — R00.0 TACHYCARDIA: ICD-10-CM

## 2020-08-30 LAB
ALBUMIN SERPL-MCNC: 3.1 G/DL (ref 3.4–5)
ALBUMIN/GLOB SERPL: 0.7 {RATIO} (ref 1–2)
ALP LIVER SERPL-CCNC: 147 U/L (ref 45–117)
ALT SERPL-CCNC: 15 U/L (ref 16–61)
ANION GAP SERPL CALC-SCNC: 3 MMOL/L (ref 0–18)
ANION GAP SERPL CALC-SCNC: 5 MMOL/L (ref 0–18)
AST SERPL-CCNC: 24 U/L (ref 15–37)
ATRIAL RATE: 126 BPM
ATRIAL RATE: 128 BPM
BASOPHILS # BLD AUTO: 0.01 X10(3) UL (ref 0–0.2)
BASOPHILS NFR BLD AUTO: 0.1 %
BILIRUB SERPL-MCNC: 0.7 MG/DL (ref 0.1–2)
BILIRUB UR QL STRIP.AUTO: NEGATIVE
BUN BLD-MCNC: 37 MG/DL (ref 7–18)
BUN BLD-MCNC: 37 MG/DL (ref 7–18)
BUN/CREAT SERPL: 15.7 (ref 10–20)
BUN/CREAT SERPL: 19.1 (ref 10–20)
CALCIUM BLD-MCNC: 8.6 MG/DL (ref 8.5–10.1)
CALCIUM BLD-MCNC: 8.9 MG/DL (ref 8.5–10.1)
CHLORIDE SERPL-SCNC: 106 MMOL/L (ref 98–112)
CHLORIDE SERPL-SCNC: 108 MMOL/L (ref 98–112)
CK SERPL-CCNC: 413 U/L (ref 39–308)
CK SERPL-CCNC: 85 U/L (ref 39–308)
CO2 SERPL-SCNC: 24 MMOL/L (ref 21–32)
CO2 SERPL-SCNC: 26 MMOL/L (ref 21–32)
CREAT BLD-MCNC: 1.94 MG/DL (ref 0.7–1.3)
CREAT BLD-MCNC: 2.36 MG/DL (ref 0.7–1.3)
CREAT UR-SCNC: 413 MG/DL
DEPRECATED RDW RBC AUTO: 54.1 FL (ref 35.1–46.3)
EOSINOPHIL # BLD AUTO: 0 X10(3) UL (ref 0–0.7)
EOSINOPHIL NFR BLD AUTO: 0 %
ERYTHROCYTE [DISTWIDTH] IN BLOOD BY AUTOMATED COUNT: 16.4 % (ref 11–15)
GLOBULIN PLAS-MCNC: 4.5 G/DL (ref 2.8–4.4)
GLUCOSE BLD-MCNC: 113 MG/DL (ref 70–99)
GLUCOSE BLD-MCNC: 116 MG/DL (ref 70–99)
GLUCOSE BLD-MCNC: 129 MG/DL (ref 70–99)
GLUCOSE BLD-MCNC: 141 MG/DL (ref 70–99)
GLUCOSE BLD-MCNC: 88 MG/DL (ref 70–99)
GLUCOSE BLD-MCNC: 95 MG/DL (ref 70–99)
GLUCOSE UR STRIP.AUTO-MCNC: NEGATIVE MG/DL
HCT VFR BLD AUTO: 32.4 % (ref 39–53)
HGB BLD-MCNC: 9.9 G/DL (ref 13–17.5)
HYALINE CASTS #/AREA URNS AUTO: PRESENT /LPF
IMM GRANULOCYTES # BLD AUTO: 0.09 X10(3) UL (ref 0–1)
IMM GRANULOCYTES NFR BLD: 0.5 %
KETONES UR STRIP.AUTO-MCNC: NEGATIVE MG/DL
L PNEUMO AG UR QL: NEGATIVE
LACTATE SERPL-SCNC: 1.7 MMOL/L (ref 0.4–2)
LEUKOCYTE ESTERASE UR QL STRIP.AUTO: NEGATIVE
LYMPHOCYTES # BLD AUTO: 0.44 X10(3) UL (ref 1–4)
LYMPHOCYTES NFR BLD AUTO: 2.6 %
M PROTEIN MFR SERPL ELPH: 7.6 G/DL (ref 6.4–8.2)
MCH RBC QN AUTO: 27.7 PG (ref 26–34)
MCHC RBC AUTO-ENTMCNC: 30.6 G/DL (ref 31–37)
MCV RBC AUTO: 90.5 FL (ref 80–100)
MONOCYTES # BLD AUTO: 0.88 X10(3) UL (ref 0.1–1)
MONOCYTES NFR BLD AUTO: 5.3 %
NEUTROPHILS # BLD AUTO: 15.33 X10 (3) UL (ref 1.5–7.7)
NEUTROPHILS # BLD AUTO: 15.33 X10(3) UL (ref 1.5–7.7)
NEUTROPHILS NFR BLD AUTO: 91.5 %
NITRITE UR QL STRIP.AUTO: NEGATIVE
NT-PROBNP SERPL-MCNC: 6332 PG/ML (ref ?–125)
OSMOLALITY SERPL CALC.SUM OF ELEC: 290 MOSM/KG (ref 275–295)
OSMOLALITY SERPL CALC.SUM OF ELEC: 295 MOSM/KG (ref 275–295)
P AXIS: 118 DEGREES
P-R INTERVAL: 162 MS
P-R INTERVAL: 168 MS
PH UR STRIP.AUTO: 5 [PH] (ref 4.5–8)
PLATELET # BLD AUTO: 239 10(3)UL (ref 150–450)
POTASSIUM SERPL-SCNC: 5.5 MMOL/L (ref 3.5–5.1)
POTASSIUM SERPL-SCNC: 5.9 MMOL/L (ref 3.5–5.1)
PROCALCITONIN SERPL-MCNC: 1.88 NG/ML (ref ?–0.16)
PROT UR STRIP.AUTO-MCNC: 100 MG/DL
PROT UR-MCNC: 114.6 MG/DL
Q-T INTERVAL: 320 MS
Q-T INTERVAL: 350 MS
QRS DURATION: 122 MS
QRS DURATION: 128 MS
QTC CALCULATION (BEZET): 467 MS
QTC CALCULATION (BEZET): 506 MS
R AXIS: 60 DEGREES
R AXIS: 64 DEGREES
RBC # BLD AUTO: 3.58 X10(6)UL (ref 4.3–5.7)
RBC UR QL AUTO: NEGATIVE
SARS-COV-2 RNA RESP QL NAA+PROBE: NOT DETECTED
SODIUM SERPL-SCNC: 13 MMOL/L
SODIUM SERPL-SCNC: 135 MMOL/L (ref 136–145)
SODIUM SERPL-SCNC: 137 MMOL/L (ref 136–145)
SP GR UR STRIP.AUTO: 1.02 (ref 1–1.03)
STREP PNEUMO ANTIGEN, URINE: NEGATIVE
T AXIS: -33 DEGREES
T AXIS: 15 DEGREES
TROPONIN I SERPL-MCNC: <0.045 NG/ML (ref ?–0.04)
UROBILINOGEN UR STRIP.AUTO-MCNC: 2 MG/DL
UUN UR-MCNC: 85 MG/DL
VENTRICULAR RATE: 126 BPM
VENTRICULAR RATE: 128 BPM
WBC # BLD AUTO: 16.8 X10(3) UL (ref 4–11)

## 2020-08-30 PROCEDURE — 87086 URINE CULTURE/COLONY COUNT: CPT | Performed by: EMERGENCY MEDICINE

## 2020-08-30 PROCEDURE — 84132 ASSAY OF SERUM POTASSIUM: CPT | Performed by: INTERNAL MEDICINE

## 2020-08-30 PROCEDURE — 87449 NOS EACH ORGANISM AG IA: CPT | Performed by: INTERNAL MEDICINE

## 2020-08-30 PROCEDURE — 87186 SC STD MICRODIL/AGAR DIL: CPT | Performed by: EMERGENCY MEDICINE

## 2020-08-30 PROCEDURE — 93306 TTE W/DOPPLER COMPLETE: CPT | Performed by: INTERNAL MEDICINE

## 2020-08-30 PROCEDURE — 93005 ELECTROCARDIOGRAM TRACING: CPT

## 2020-08-30 PROCEDURE — 84484 ASSAY OF TROPONIN QUANT: CPT | Performed by: EMERGENCY MEDICINE

## 2020-08-30 PROCEDURE — 82570 ASSAY OF URINE CREATININE: CPT | Performed by: INTERNAL MEDICINE

## 2020-08-30 PROCEDURE — 87184 SC STD DISK METHOD PER PLATE: CPT | Performed by: EMERGENCY MEDICINE

## 2020-08-30 PROCEDURE — 84145 PROCALCITONIN (PCT): CPT | Performed by: NURSE PRACTITIONER

## 2020-08-30 PROCEDURE — 84300 ASSAY OF URINE SODIUM: CPT | Performed by: INTERNAL MEDICINE

## 2020-08-30 PROCEDURE — 83605 ASSAY OF LACTIC ACID: CPT | Performed by: EMERGENCY MEDICINE

## 2020-08-30 PROCEDURE — 87150 DNA/RNA AMPLIFIED PROBE: CPT | Performed by: EMERGENCY MEDICINE

## 2020-08-30 PROCEDURE — 36415 COLL VENOUS BLD VENIPUNCTURE: CPT

## 2020-08-30 PROCEDURE — 99285 EMERGENCY DEPT VISIT HI MDM: CPT

## 2020-08-30 PROCEDURE — 81001 URINALYSIS AUTO W/SCOPE: CPT | Performed by: EMERGENCY MEDICINE

## 2020-08-30 PROCEDURE — 87147 CULTURE TYPE IMMUNOLOGIC: CPT | Performed by: EMERGENCY MEDICINE

## 2020-08-30 PROCEDURE — 82550 ASSAY OF CK (CPK): CPT | Performed by: INTERNAL MEDICINE

## 2020-08-30 PROCEDURE — 85025 COMPLETE CBC W/AUTO DIFF WBC: CPT | Performed by: EMERGENCY MEDICINE

## 2020-08-30 PROCEDURE — 93010 ELECTROCARDIOGRAM REPORT: CPT

## 2020-08-30 PROCEDURE — 3E033XZ INTRODUCTION OF VASOPRESSOR INTO PERIPHERAL VEIN, PERCUTANEOUS APPROACH: ICD-10-PCS | Performed by: INTERNAL MEDICINE

## 2020-08-30 PROCEDURE — 71045 X-RAY EXAM CHEST 1 VIEW: CPT | Performed by: EMERGENCY MEDICINE

## 2020-08-30 PROCEDURE — 94660 CPAP INITIATION&MGMT: CPT

## 2020-08-30 PROCEDURE — 82962 GLUCOSE BLOOD TEST: CPT

## 2020-08-30 PROCEDURE — 87040 BLOOD CULTURE FOR BACTERIA: CPT | Performed by: EMERGENCY MEDICINE

## 2020-08-30 PROCEDURE — 83880 ASSAY OF NATRIURETIC PEPTIDE: CPT | Performed by: EMERGENCY MEDICINE

## 2020-08-30 PROCEDURE — 84540 ASSAY OF URINE/UREA-N: CPT | Performed by: INTERNAL MEDICINE

## 2020-08-30 PROCEDURE — 82550 ASSAY OF CK (CPK): CPT | Performed by: EMERGENCY MEDICINE

## 2020-08-30 PROCEDURE — 84156 ASSAY OF PROTEIN URINE: CPT | Performed by: INTERNAL MEDICINE

## 2020-08-30 PROCEDURE — 80053 COMPREHEN METABOLIC PANEL: CPT | Performed by: EMERGENCY MEDICINE

## 2020-08-30 PROCEDURE — 96365 THER/PROPH/DIAG IV INF INIT: CPT

## 2020-08-30 PROCEDURE — 73560 X-RAY EXAM OF KNEE 1 OR 2: CPT | Performed by: EMERGENCY MEDICINE

## 2020-08-30 PROCEDURE — 96375 TX/PRO/DX INJ NEW DRUG ADDON: CPT

## 2020-08-30 PROCEDURE — 96376 TX/PRO/DX INJ SAME DRUG ADON: CPT

## 2020-08-30 PROCEDURE — 70450 CT HEAD/BRAIN W/O DYE: CPT | Performed by: EMERGENCY MEDICINE

## 2020-08-30 PROCEDURE — 51701 INSERT BLADDER CATHETER: CPT

## 2020-08-30 RX ORDER — HYDROCODONE BITARTRATE AND ACETAMINOPHEN 5; 325 MG/1; MG/1
1-2 TABLET ORAL EVERY 6 HOURS PRN
Status: DISCONTINUED | OUTPATIENT
Start: 2020-08-30 | End: 2020-09-05

## 2020-08-30 RX ORDER — HEPARIN SODIUM 5000 [USP'U]/ML
7500 INJECTION, SOLUTION INTRAVENOUS; SUBCUTANEOUS EVERY 8 HOURS SCHEDULED
Status: DISCONTINUED | OUTPATIENT
Start: 2020-08-30 | End: 2020-08-30

## 2020-08-30 RX ORDER — SODIUM CHLORIDE 9 MG/ML
INJECTION, SOLUTION INTRAVENOUS CONTINUOUS
Status: DISCONTINUED | OUTPATIENT
Start: 2020-08-30 | End: 2020-08-31

## 2020-08-30 RX ORDER — MORPHINE SULFATE 2 MG/ML
1 INJECTION, SOLUTION INTRAMUSCULAR; INTRAVENOUS EVERY 2 HOUR PRN
Status: DISCONTINUED | OUTPATIENT
Start: 2020-08-30 | End: 2020-08-31

## 2020-08-30 RX ORDER — DEXTROSE MONOHYDRATE 25 G/50ML
50 INJECTION, SOLUTION INTRAVENOUS
Status: DISCONTINUED | OUTPATIENT
Start: 2020-08-30 | End: 2020-09-04

## 2020-08-30 RX ORDER — LIDOCAINE HYDROCHLORIDE 20 MG/ML
10 JELLY TOPICAL ONCE
Status: COMPLETED | OUTPATIENT
Start: 2020-08-30 | End: 2020-08-30

## 2020-08-30 RX ORDER — SODIUM CHLORIDE 9 MG/ML
500 INJECTION, SOLUTION INTRAVENOUS ONCE
Status: COMPLETED | OUTPATIENT
Start: 2020-08-30 | End: 2020-08-30

## 2020-08-30 RX ORDER — MORPHINE SULFATE 2 MG/ML
4 INJECTION, SOLUTION INTRAMUSCULAR; INTRAVENOUS EVERY 2 HOUR PRN
Status: DISCONTINUED | OUTPATIENT
Start: 2020-08-30 | End: 2020-08-31

## 2020-08-30 RX ORDER — ONDANSETRON 4 MG/1
4 TABLET, ORALLY DISINTEGRATING ORAL EVERY 6 HOURS PRN
Status: DISCONTINUED | OUTPATIENT
Start: 2020-08-30 | End: 2020-09-10

## 2020-08-30 RX ORDER — CYCLOBENZAPRINE HCL 10 MG
10 TABLET ORAL 3 TIMES DAILY PRN
Status: DISCONTINUED | OUTPATIENT
Start: 2020-08-30 | End: 2020-09-10

## 2020-08-30 RX ORDER — DOXYCYCLINE HYCLATE 100 MG/1
100 CAPSULE ORAL EVERY 12 HOURS SCHEDULED
Status: DISCONTINUED | OUTPATIENT
Start: 2020-08-30 | End: 2020-08-31

## 2020-08-30 RX ORDER — FUROSEMIDE 10 MG/ML
20 INJECTION INTRAMUSCULAR; INTRAVENOUS ONCE
Status: COMPLETED | OUTPATIENT
Start: 2020-08-30 | End: 2020-08-30

## 2020-08-30 RX ORDER — METOPROLOL SUCCINATE 50 MG/1
50 TABLET, EXTENDED RELEASE ORAL DAILY
Status: DISCONTINUED | OUTPATIENT
Start: 2020-08-30 | End: 2020-08-30

## 2020-08-30 RX ORDER — ONDANSETRON 2 MG/ML
4 INJECTION INTRAMUSCULAR; INTRAVENOUS EVERY 6 HOURS PRN
Status: DISCONTINUED | OUTPATIENT
Start: 2020-08-30 | End: 2020-09-10

## 2020-08-30 RX ORDER — LISINOPRIL 2.5 MG/1
2.5 TABLET ORAL
Status: DISCONTINUED | OUTPATIENT
Start: 2020-08-30 | End: 2020-08-30

## 2020-08-30 RX ORDER — ACETAMINOPHEN 325 MG/1
650 TABLET ORAL EVERY 6 HOURS PRN
Status: DISCONTINUED | OUTPATIENT
Start: 2020-08-30 | End: 2020-09-10

## 2020-08-30 RX ORDER — FUROSEMIDE 20 MG/1
20 TABLET ORAL DAILY
Status: DISCONTINUED | OUTPATIENT
Start: 2020-08-30 | End: 2020-08-30

## 2020-08-30 RX ORDER — ATORVASTATIN CALCIUM 20 MG/1
20 TABLET, FILM COATED ORAL NIGHTLY
Status: DISCONTINUED | OUTPATIENT
Start: 2020-08-30 | End: 2020-09-10

## 2020-08-30 RX ORDER — PIOGLITAZONEHYDROCHLORIDE 15 MG/1
45 TABLET ORAL
Status: DISCONTINUED | OUTPATIENT
Start: 2020-08-30 | End: 2020-08-30

## 2020-08-30 RX ORDER — GABAPENTIN 300 MG/1
300 CAPSULE ORAL NIGHTLY
Status: DISCONTINUED | OUTPATIENT
Start: 2020-08-30 | End: 2020-09-01

## 2020-08-30 RX ORDER — DULOXETIN HYDROCHLORIDE 30 MG/1
60 CAPSULE, DELAYED RELEASE ORAL
Status: DISCONTINUED | OUTPATIENT
Start: 2020-08-30 | End: 2020-09-10

## 2020-08-30 RX ORDER — METOPROLOL SUCCINATE 25 MG/1
25 TABLET, EXTENDED RELEASE ORAL
Status: DISCONTINUED | OUTPATIENT
Start: 2020-08-30 | End: 2020-08-30

## 2020-08-30 RX ORDER — CYCLOBENZAPRINE HCL 10 MG
10 TABLET ORAL ONCE
Status: COMPLETED | OUTPATIENT
Start: 2020-08-30 | End: 2020-08-30

## 2020-08-30 RX ORDER — MORPHINE SULFATE 2 MG/ML
2 INJECTION, SOLUTION INTRAMUSCULAR; INTRAVENOUS EVERY 2 HOUR PRN
Status: DISCONTINUED | OUTPATIENT
Start: 2020-08-30 | End: 2020-08-31

## 2020-08-30 RX ORDER — GABAPENTIN 300 MG/1
300 CAPSULE ORAL 3 TIMES DAILY
Status: DISCONTINUED | OUTPATIENT
Start: 2020-08-30 | End: 2020-08-30

## 2020-08-30 NOTE — PROGRESS NOTES
Admitted a pt from ER around 0630. Pt is AO x 4. On 2L NC, maintaining sats. ST in 120s on the monitor w/ BBB; BP stable in 640-59 systolic. Afebrile  Admission care done. Made comfortable. Safety precaution in place. Call button within reach.   Will mon

## 2020-08-30 NOTE — PLAN OF CARE
Pt a/o x4. C/o Bilateral knee pain. Adequate pain relief achieved w/PRN Norco, see MAR. Stable aflutter observed to bedside monitor. Tolerating 3L NC. Intermittent dyspnea at rest and with activity, resolves at rest. Good appetite.  Wound care orders in fabiola

## 2020-08-30 NOTE — ED PROVIDER NOTES
Patient Seen in: [facility] Emergency Department      History   Patient presents with:  Lower Extremity Injury    Stated Complaint: bilateral knee pain/numbness    HPI    69-year-old male past medical history of obesity, hypertension hyperlipidemia, 2/23/2011   • Mixed hyperlipidemia    • Morbid obesity ([facility] [address]    • Osteoarthritis, localized, knee 10/26/2011    left   • Renal insufficiency, mild July 2010    Was hospitalized due to dehydration and had acute renal insufficiency (creatinine 1.85)   • Scal General: Bowel sounds are normal.      Palpations: Abdomen is soft. Musculoskeletal:         General: No deformity. Comments: Tenderness palpation bilateral knee joints.   Patient refusing to bend at the knee stating that it hurts too much bilateral RAPID SARS-COV-2 BY PCR - Normal   CBC WITH DIFFERENTIAL WITH PLATELET    Narrative: The following orders were created for panel order CBC WITH DIFFERENTIAL WITH PLATELET.   Procedure                               Abnormality         Status distress. Patient does have +2-3 pitting edema from his lower extremities extending up to his abdomen. He is grossly obese. Patient has complained of significant pain in his knee bilaterally. Refusing to flex at the knee joints bilaterally.   X-rays neg

## 2020-08-30 NOTE — PLAN OF CARE
Pt transferred back to the ICU this afternoon due to hypotension. Norepinephrine infusion initiated and titrated to maintain MD ordered parameters, see MAR. Pt a/o x4. C/o severe pain to bilateral knees.  Adequate relief of pain achieved w/PRN morphine, see

## 2020-08-30 NOTE — CONSULTS
Cardiology Consultation  1 [facility] Patient Status:  Inpatient    1960 MRN [id number]   [facility] 4SW-A Attending Cinthia Villagran MD   Hosp Day # 0 PCP Uri Rosario MD     Reason for Consultation:  AFL laceration Dx in 3/2012    He hit his head at work and laceration required 3 staples.    • Shingles    • Shortness of breath    • Shoulder fracture, left     Hairline   • Sleep apnea    • Sleep apnea, obstructive on CPAP    HTR as of 4-16-12   • Type II or (eight) hours as needed for Pain., Disp: 30 tablet, Rfl: 0  gabapentin 300 MG Oral Cap, Take 1 capsule (300 mg total) by mouth 3 (three) times daily. , Disp: 270 capsule, Rfl: 3  LANTUS SOLOSTAR 100 UNIT/ML Subcutaneous Solution Pen-injector, INJECT 52 UNIT (!) 425 lb (192.8 kg)      General: awake, alert, oriented x 3, no acute distress  HEENT: at/nc, perrl, eomi  Neck: No JVD, carotids 2+ no bruits. Cardiac: Regular rate and rhythm, S1, S2 normal, no murmur, rub or gallop.   Lungs: Diminished bilat  Abdomen Lab Results   Component Value Date    ALT 15 (L) 08/30/2020    ALT 14 (L) 08/03/2020    ALT 13 06/18/2019       Assessment/Plan:  63year old male presenting with:    1) Fall  2) SOPHIA on CKD  3) Recently diagnosed atrial flutter  4) HTN  5) HL  6) DM  7

## 2020-08-30 NOTE — PLAN OF CARE
assumed patient care at 1100. Alert and oriented x4. On 2L, . Aflutter on tele. HR 110s. BPs low. MD notified. 500cc bolus given. Bariatric bed ordered. Patient complains of moderate leg pain. md notified. New orders per [facility]. WC to see.   res

## 2020-08-30 NOTE — CONSULTS
[facility] Pulmonary, Critical Care and Sleep    Cheyenne Low Patient Status:  Inpatient    1960 MRN [id number]   Location 455/455-A PCP Kris Boyle MD     Date of Admission: 2020  History of Present Illness: Pt is a 63year old male consulted for C renal insufficiency (creatinine 1.85)   • Scalp laceration Dx in 3/2012    He hit his head at work and laceration required 3 staples.    • Shingles    • Shortness of breath    • Shoulder fracture, left     Hairline   • Sleep apnea    • Sleep apnea, obstruct DAILY, Disp: 90 tablet, Rfl: 3  Glucose Blood In Vitro Strip, To test up to 4 times daily. , Disp: 400 each, Rfl: 3  Diclofenac Sodium 1 % Transdermal Gel, apply 4 grams to both knees 4 times daily as needed, Disp: 1200 g, Rfl: 12  Insulin Pen Needle (BD PE Nontender, non distended. Obese. Ext: 2+ edema, legs wrapped. Skin: No rashes.      Labs:    Recent Labs   Lab 08/30/20 0154   WBC 16.8*   HGB 9.9*   HCT 32.4*   .0     Recent Labs   Lab 08/30/20 0154   *   BUN 37*   CREATSERUM 1.94*   G

## 2020-08-30 NOTE — CONSULTS
NEPHROLOGY CONSULT NOTE     DATE: 8/30/2020    Requesting Physician: Dr. Kelli Mata     Reason for Consult: acute renal failure     HISTORY OF PRESENT ILLNESS: Jack Workman is a 63year old male with history of morbid obesity, HTN, DM, bilateral lower extrem • Scalp laceration Dx in 3/2012    He hit his head at work and laceration required 3 staples.    • Shingles    • Shortness of breath    • Shoulder fracture, left     Hairline   • Sleep apnea    • Sleep apnea, obstructive on CPAP    HTR as of 4-16-12   • T connections:        Talks on phone: Not on file        Gets together: Not on file        Attends [employer] service: Not on file        Active member of club or organization: Not on file        Attends meetings of clubs or organizations: Not on file tablet, Oral, Q6H PRN  DULoxetine HCl (CYMBALTA) DR particles cap 60 mg, 60 mg, Oral, Daily  furosemide (LASIX) tab 20 mg, 20 mg, Oral, Daily  insulin detemir (LEVEMIR) 100 UNIT/ML flextouch 52 Units, 52 Units, Subcutaneous, Daily  atorvastatin (LIPITOR) t DAILY  Glucose Blood In Vitro Strip, To test up to 4 times daily.   Diclofenac Sodium 1 % Transdermal Gel, apply 4 grams to both knees 4 times daily as needed  Insulin Pen Needle (BD PEN NEEDLE MINI U/F) 31G X 5 MM Does not apply Misc, Use twice daily  ONET opacities, correlate clinically. ED M.TERI. notified of these findings with preliminary radiology report from [facility] services.      Dictated by (CST): Oneal Smith MD on 8/30/2020 at 7:38 AM     Finalized by (CST): Oneal Smith MD on 8/30/2020 at 7:40 AM worsening tomorrow would get renal US  - monitor for volume overload/worsennig resp status while on maintenance IVFs      Hyperkalemia   - due to SOPHIA, ACEI  - repeat K level now   - low K diet when taking PO     Hypotension  - vasopressors per ICU   - Main

## 2020-08-30 NOTE — PROGRESS NOTES
[facility] Pharmacy Note: Antimicrobial Weight Based Dose Adjustment for: ceftriaxone (ROCEPHIN)    Jamie Meléndez is a 63year old patient who has been prescribed ceftriaxone (ROCEPHIN) 1 gm x 1 dose.   Estimated Creatinine Clearance: 42.6 mL/min (A) (based on SCr

## 2020-08-30 NOTE — PHYSICAL THERAPY NOTE
Order received for PT evaluation, however pt is awaiting Echo, transfer out of ICU and is exhausted from being admitted overnight. Will initiate PT Evaluation on 8/31 as appropriate.

## 2020-08-30 NOTE — PROGRESS NOTES
[facility] Pharmacy Note:  Anticoagulation Weight Dose Adjustment for heparin    Anca Coburn is a 63year old patient who has been prescribed heparin 5000 units every 8 hours. Estimated Creatinine Clearance: 42.6 mL/min (A) (based on SCr of 1.94 mg/dL (H)).

## 2020-08-30 NOTE — H&P
General Medicine H&P     Patient presents with:  Lower Extremity Injury       PCP: Justin Montelongo MD    History of Present Illness: Patient is a 63year old male with PMH including but not limited to DM, HL, HTN, CAMI, morbid obesity, aflutter who p/t [facility] ED Comment:Pt states felt \"tingling & weird\"     Insulin Aspart Pen, 2-10 Units, TID CC and HS  [START ON 8/31/2020] cefTRIAXone, 2 g, Q24H  DULoxetine HCl, 60 mg, Daily  furosemide, 20 mg, Daily  gabapentin, 300 mg, TID  insulin detemir, 52 Units, Daily  a 7.6 08/30/2020    AST 24 08/30/2020    ALT 15 08/30/2020    TROP <0.045 08/30/2020    CK 85 08/30/2020    PGLU 113 08/30/2020       Radiology: Xr Chest Ap/pa (1 View) (cpt=71045)    Result Date: 8/30/2020  PROCEDURE:  XR CHEST AP/PA (1 VIEW) (CPT=71045)  T when compared to most recent left knee radiographs dated 10/26/2011. ELAINE REILLY notified of these findings with preliminary radiology report from [facility] services.     Dictated by (CST): Rome Fuller MD on 8/30/2020 at 7:44 AM     Finalized by (CST): Miguel Olivier the ventricles and sulci. INTRACRANIAL:  There are no abnormal extraaxial fluid collections. There is no midline shift. There are no acute appearing intraparenchymal brain abnormalities. There is nothing specific for acute territorial infarct.   There i from excess weight  -prn norco, gabapentin, flexeril added x1, reports it helped; said fentanyl helped in ED    Dispo: tx back to ICU given Timbo, d/w Aaron Vela/Patricia; D/w TAL Menendez/Shirley  -Pt lives c wife        Outpatient records or previous hospital rec

## 2020-08-30 NOTE — PROGRESS NOTES
Cardiology follow-up  Called to see patient due to hypotension. Patient with morbid obesity, lower extremity cellulitis admitted. Patient was seen as an outpatient on Thursday or Friday last week with newly observed atrial fibrillation/flutter.   He was

## 2020-08-30 NOTE — ED INITIAL ASSESSMENT (HPI)
Patient arrives from home per EMS for evaluation of fall out of bed.  Hx of chronic knee pain and is having increased pain

## 2020-08-31 ENCOUNTER — APPOINTMENT (OUTPATIENT)
Dept: WOUND CARE | Facility: HOSPITAL | Age: 60
End: 2020-08-31
Attending: INTERNAL MEDICINE
Payer: COMMERCIAL

## 2020-08-31 ENCOUNTER — APPOINTMENT (OUTPATIENT)
Dept: ULTRASOUND IMAGING | Facility: HOSPITAL | Age: 60
DRG: 871 | End: 2020-08-31
Attending: INTERNAL MEDICINE
Payer: COMMERCIAL

## 2020-08-31 LAB
ALBUMIN SERPL-MCNC: 3 G/DL (ref 3.4–5)
ALBUMIN/GLOB SERPL: 0.7 {RATIO} (ref 1–2)
ALP LIVER SERPL-CCNC: 117 U/L (ref 45–117)
ALT SERPL-CCNC: 14 U/L (ref 16–61)
ANION GAP SERPL CALC-SCNC: 3 MMOL/L (ref 0–18)
ANION GAP SERPL CALC-SCNC: 4 MMOL/L (ref 0–18)
AST SERPL-CCNC: 40 U/L (ref 15–37)
ATRIAL RATE: 66 BPM
BASOPHILS # BLD AUTO: 0.04 X10(3) UL (ref 0–0.2)
BASOPHILS NFR BLD AUTO: 0.3 %
BILIRUB SERPL-MCNC: 0.8 MG/DL (ref 0.1–2)
BUN BLD-MCNC: 44 MG/DL (ref 7–18)
BUN BLD-MCNC: 45 MG/DL (ref 7–18)
BUN/CREAT SERPL: 18.4 (ref 10–20)
BUN/CREAT SERPL: 21.5 (ref 10–20)
CALCIUM BLD-MCNC: 8.4 MG/DL (ref 8.5–10.1)
CALCIUM BLD-MCNC: 8.8 MG/DL (ref 8.5–10.1)
CHLORIDE SERPL-SCNC: 108 MMOL/L (ref 98–112)
CHLORIDE SERPL-SCNC: 109 MMOL/L (ref 98–112)
CK SERPL-CCNC: 373 U/L (ref 39–308)
CO2 SERPL-SCNC: 23 MMOL/L (ref 21–32)
CO2 SERPL-SCNC: 26 MMOL/L (ref 21–32)
CREAT BLD-MCNC: 2.05 MG/DL (ref 0.7–1.3)
CREAT BLD-MCNC: 2.45 MG/DL (ref 0.7–1.3)
DEPRECATED RDW RBC AUTO: 57.5 FL (ref 35.1–46.3)
EOSINOPHIL # BLD AUTO: 0.02 X10(3) UL (ref 0–0.7)
EOSINOPHIL NFR BLD AUTO: 0.1 %
ERYTHROCYTE [DISTWIDTH] IN BLOOD BY AUTOMATED COUNT: 17 % (ref 11–15)
GLOBULIN PLAS-MCNC: 4.6 G/DL (ref 2.8–4.4)
GLUCOSE BLD-MCNC: 104 MG/DL (ref 70–99)
GLUCOSE BLD-MCNC: 118 MG/DL (ref 70–99)
GLUCOSE BLD-MCNC: 125 MG/DL (ref 70–99)
GLUCOSE BLD-MCNC: 143 MG/DL (ref 70–99)
GLUCOSE BLD-MCNC: 66 MG/DL (ref 70–99)
GLUCOSE BLD-MCNC: 69 MG/DL (ref 70–99)
GLUCOSE BLD-MCNC: 72 MG/DL (ref 70–99)
GLUCOSE BLD-MCNC: 77 MG/DL (ref 70–99)
GLUCOSE BLD-MCNC: 90 MG/DL (ref 70–99)
GLUCOSE BLD-MCNC: 91 MG/DL (ref 70–99)
HAV IGM SER QL: 1.9 MG/DL (ref 1.6–2.6)
HCT VFR BLD AUTO: 32.5 % (ref 39–53)
HGB BLD-MCNC: 9.7 G/DL (ref 13–17.5)
IMM GRANULOCYTES # BLD AUTO: 0.1 X10(3) UL (ref 0–1)
IMM GRANULOCYTES NFR BLD: 0.7 %
LYMPHOCYTES # BLD AUTO: 1.39 X10(3) UL (ref 1–4)
LYMPHOCYTES NFR BLD AUTO: 9.1 %
M PROTEIN MFR SERPL ELPH: 7.6 G/DL (ref 6.4–8.2)
MCH RBC QN AUTO: 27.7 PG (ref 26–34)
MCHC RBC AUTO-ENTMCNC: 29.8 G/DL (ref 31–37)
MCV RBC AUTO: 92.9 FL (ref 80–100)
MONOCYTES # BLD AUTO: 1.03 X10(3) UL (ref 0.1–1)
MONOCYTES NFR BLD AUTO: 6.7 %
NEUTROPHILS # BLD AUTO: 12.71 X10 (3) UL (ref 1.5–7.7)
NEUTROPHILS # BLD AUTO: 12.71 X10(3) UL (ref 1.5–7.7)
NEUTROPHILS NFR BLD AUTO: 83.1 %
OSMOLALITY SERPL CALC.SUM OF ELEC: 291 MOSM/KG (ref 275–295)
OSMOLALITY SERPL CALC.SUM OF ELEC: 295 MOSM/KG (ref 275–295)
PHOSPHATE SERPL-MCNC: 3.7 MG/DL (ref 2.5–4.9)
PLATELET # BLD AUTO: 231 10(3)UL (ref 150–450)
POTASSIUM SERPL-SCNC: 5.2 MMOL/L (ref 3.5–5.1)
POTASSIUM SERPL-SCNC: 5.3 MMOL/L (ref 3.5–5.1)
POTASSIUM SERPL-SCNC: 5.4 MMOL/L (ref 3.5–5.1)
POTASSIUM SERPL-SCNC: 5.5 MMOL/L (ref 3.5–5.1)
POTASSIUM SERPL-SCNC: 5.8 MMOL/L (ref 3.5–5.1)
POTASSIUM SERPL-SCNC: 6 MMOL/L (ref 3.5–5.1)
POTASSIUM SERPL-SCNC: 6 MMOL/L (ref 3.5–5.1)
PROCALCITONIN SERPL-MCNC: 13.69 NG/ML (ref ?–0.16)
Q-T INTERVAL: 368 MS
QRS DURATION: 126 MS
QTC CALCULATION (BEZET): 545 MS
R AXIS: 68 DEGREES
RBC # BLD AUTO: 3.5 X10(6)UL (ref 4.3–5.7)
SODIUM SERPL-SCNC: 136 MMOL/L (ref 136–145)
SODIUM SERPL-SCNC: 137 MMOL/L (ref 136–145)
T AXIS: 61 DEGREES
VENTRICULAR RATE: 132 BPM
WBC # BLD AUTO: 15.3 X10(3) UL (ref 4–11)

## 2020-08-31 PROCEDURE — 82962 GLUCOSE BLOOD TEST: CPT

## 2020-08-31 PROCEDURE — 84145 PROCALCITONIN (PCT): CPT | Performed by: INTERNAL MEDICINE

## 2020-08-31 PROCEDURE — 99214 OFFICE O/P EST MOD 30 MIN: CPT

## 2020-08-31 PROCEDURE — 84132 ASSAY OF SERUM POTASSIUM: CPT | Performed by: INTERNAL MEDICINE

## 2020-08-31 PROCEDURE — 80053 COMPREHEN METABOLIC PANEL: CPT | Performed by: NURSE PRACTITIONER

## 2020-08-31 PROCEDURE — 97162 PT EVAL MOD COMPLEX 30 MIN: CPT

## 2020-08-31 PROCEDURE — 76775 US EXAM ABDO BACK WALL LIM: CPT | Performed by: INTERNAL MEDICINE

## 2020-08-31 PROCEDURE — 97530 THERAPEUTIC ACTIVITIES: CPT

## 2020-08-31 PROCEDURE — 84100 ASSAY OF PHOSPHORUS: CPT | Performed by: NURSE PRACTITIONER

## 2020-08-31 PROCEDURE — 82550 ASSAY OF CK (CPK): CPT | Performed by: INTERNAL MEDICINE

## 2020-08-31 PROCEDURE — 85025 COMPLETE CBC W/AUTO DIFF WBC: CPT | Performed by: NURSE PRACTITIONER

## 2020-08-31 PROCEDURE — 83735 ASSAY OF MAGNESIUM: CPT | Performed by: NURSE PRACTITIONER

## 2020-08-31 PROCEDURE — 93005 ELECTROCARDIOGRAM TRACING: CPT

## 2020-08-31 PROCEDURE — 93010 ELECTROCARDIOGRAM REPORT: CPT | Performed by: INTERNAL MEDICINE

## 2020-08-31 RX ORDER — SODIUM POLYSTYRENE SULFONATE 4.1 MEQ/G
30 POWDER, FOR SUSPENSION ORAL; RECTAL ONCE
Status: COMPLETED | OUTPATIENT
Start: 2020-08-31 | End: 2020-08-31

## 2020-08-31 RX ORDER — METOPROLOL SUCCINATE 25 MG/1
25 TABLET, EXTENDED RELEASE ORAL
Status: DISCONTINUED | OUTPATIENT
Start: 2020-09-01 | End: 2020-09-04

## 2020-08-31 RX ORDER — CLINDAMYCIN PHOSPHATE 900 MG/50ML
900 INJECTION INTRAVENOUS EVERY 8 HOURS
Status: DISCONTINUED | OUTPATIENT
Start: 2020-08-31 | End: 2020-09-02

## 2020-08-31 RX ORDER — HYDROMORPHONE HYDROCHLORIDE 1 MG/ML
0.4 INJECTION, SOLUTION INTRAMUSCULAR; INTRAVENOUS; SUBCUTANEOUS EVERY 2 HOUR PRN
Status: DISCONTINUED | OUTPATIENT
Start: 2020-08-31 | End: 2020-09-05

## 2020-08-31 RX ORDER — HYDROMORPHONE HYDROCHLORIDE 1 MG/ML
0.2 INJECTION, SOLUTION INTRAMUSCULAR; INTRAVENOUS; SUBCUTANEOUS EVERY 2 HOUR PRN
Status: DISCONTINUED | OUTPATIENT
Start: 2020-08-31 | End: 2020-09-05

## 2020-08-31 RX ORDER — DIGOXIN 0.25 MG/ML
500 INJECTION INTRAMUSCULAR; INTRAVENOUS ONCE
Status: COMPLETED | OUTPATIENT
Start: 2020-08-31 | End: 2020-08-31

## 2020-08-31 RX ORDER — DEXTROSE MONOHYDRATE 25 G/50ML
50 INJECTION, SOLUTION INTRAVENOUS ONCE
Status: COMPLETED | OUTPATIENT
Start: 2020-08-31 | End: 2020-08-31

## 2020-08-31 RX ORDER — HYDROMORPHONE HYDROCHLORIDE 1 MG/ML
0.8 INJECTION, SOLUTION INTRAMUSCULAR; INTRAVENOUS; SUBCUTANEOUS EVERY 2 HOUR PRN
Status: DISCONTINUED | OUTPATIENT
Start: 2020-08-31 | End: 2020-09-05

## 2020-08-31 NOTE — PROGRESS NOTES
Critical Care Progress Note     Assessment / Plan:  1. Shock: Septic shock 2/2 strep bacteremia   - Wean levophed as tolerated for MAPs > 65  - On IV ceftriaxone  - ID consult   - IVF prn   2. Respiratory insufficiency favor secondary to fluid overload.

## 2020-08-31 NOTE — CONSULTS
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 Jac Zambrano, Infectious Disease. Patient was pre-medicated prior to Rx with IV pain medication. Observation:  +lymphedema to B LE, +Stemmer's sign appreciated; +wrinking into the skin noted. Dry skin appreciated.  +Dorsal pedal pulse noted, + [facility] [address]  [phone]  Inpatient Wound Care Pager #[phone]

## 2020-08-31 NOTE — PROGRESS NOTES
[facility]    Nephrology Progress Note    Danielle Olivo Attending:   Bladimir Butler MD     Cc: Spencer Al    SUBJECTIVE     uop slightly improved  Off levo this am  No other complaints    PHYSICAL EXAM   Vital signs: /58 (BP Location: Right arm) (DEX-4) chewable tab 4 tablet, 4 tablet, Oral, Q15 Min PRN    Or  dextrose 50 % injection 50 mL, 50 mL, Intravenous, Q15 Min PRN    Or  glucose (DEX4) oral liquid 30 g, 30 g, Oral, Q15 Min PRN    Or  Glucose-Vitamin C (DEX-4) chewable tab 8 tablet, 8 table HYDRONEPHROSIS:  None. CYSTS/STONES/MASSES:  Cyst upper pole right kidney superior medial 4.0 x     4.4 x 4.7 cm having simple appearance. LEFT KIDNEY     No left kidney visible. This appears secondary to bowel gas most likely.          BLADDER: XR KNEE (1 OR 2 VIEWS), RIGHT (CPT=73560)   Final Result    PROCEDURE:  XR KNEE (1 OR 2 VIEWS), RIGHT (CPT=73560)         COMPARISON:  [facility] , XR, KNEE (AP   LATERAL),RIGHT XRAY, 10/26/2011,     12:45 PM.         INDICATIONS:  bilateral knee pain/numbne =====    CONCLUSION:      1. Cardiomegaly with pulmonary vasculature redistribution and increased     interstitial opacities, correlate clinically.          ED M.TERI. notified of these findings with preliminary radiology report from     nighth 63year old male with history of morbid obesity, HTN, DM, bilateral lower extremity cellulitis/ wounds, recent diagnosis of aflutter/ atrial fibrillation. Presented to the ED s/p fall at home.  Nephrology is consulted for SOPHIA     SOPHIA, oliguric   - Worsening

## 2020-08-31 NOTE — PROGRESS NOTES
[facility] Hospitalist Progress Note     PCP: Renetta Cheek MD    Chief Complaint: follow-up    Overnight/Interim Events:      SUBJECTIVE:  Pt seen independently and then together c Dr. Tamra Bosworth in bed, more awake today, color better per family.    Dec Recent Labs   Lab 08/30/20  0154   TROP <0.045         Meds:     • ceFAZolin  3 g Intravenous Q12H   • clindamycin  900 mg Intravenous Q8H   • digoxin  500 mcg Intravenous Once   • Insulin Aspart Pen  2-10 Units Subcutaneous TID CC and HS   • DULoxet Anemia  -likely from chronic medical conditions   -hgb 9.7, follow    # Type 2 diabetes mellitus  -Hyperglycemic protocol with accu-checks QID and med-dose sliding scale insulin. Will keep 1800 ADA diet.  Will hold PO meds  -levemir 52u at home, [id number] [address]

## 2020-08-31 NOTE — CONSULTS
INFECTIOUS DISEASE CONSULT NOTE    Marcial Robertson Patient Status:  Inpatient    1960 MRN [id number]   [facility] 4SW-A Attending Elana Young MD   [address] Day # 1 PCP Les Ortiz • Scalp laceration Dx in 3/2012    He hit his head at work and laceration required 3 staples.    • Shingles    • Shortness of breath    • Shoulder fracture, left     Hairline   • Sleep apnea    • Sleep apnea, obstructive on CPAP    HTR as of 4-16-12   • Typ •  Insulin Aspart Pen (NOVOLOG) 100 UNIT/ML flexpen 2-10 Units, 2-10 Units, Subcutaneous, TID CC and HS  •  cefTRIAXone Sodium (ROCEPHIN) 2 g in sodium chloride 0.9% 100 mL MBP/ADD-vantage, 2 g, Intravenous, Q24H  •  HYDROcodone-acetaminophen (NORCO) 5-325 Neurological: Negative for headaches, dizziness, focal neuro deficits  Behavioral/Psych: Negative for anxiety or depression    Physical Exam:    General: No acute distress. Alert and oriented x 3.   Vital signs: Blood pressure 118/69, pulse (!) 133, tempera BILUR Negative   KETUR Negative   BLOODURINE Negative   PHURINE 5.0   PROUR 100 *   UROBILINOGEN 2.0   NITRITE Negative   LEUUR Negative   WBCUR 5-10*   RBCUR 3-5*   BACUR Rare*   EPIUR Small         Microbiology    Reviewed in EMR,     Radiology: Xr Chest PROCEDURE:  XR KNEE (1 OR 2 VIEWS), LEFT (CPT=73560)  COMPARISON:  [facility] , XR, KNEE (AP   LATERAL), LEFT XRAY, 10/26/2011, 12:24 PM.  INDICATIONS:  bilateral knee pain/numbness  PATIENT STATED HISTORY: (As transcribed by Technologist)  Bilateral knee pain Result Date: 8/30/2020  PROCEDURE:  CT BRAIN OR HEAD (88869)  COMPARISON:  None. INDICATIONS:  bilateral knee pain/numbness  TECHNIQUE:  Noncontrast CT scanning is performed through the brain. Dose reduction techniques were used.  Dose information is trans - follow cr and adjust abx dose if improving  Case and plan d/w pt and staff  Thank you for allowing me to participate in the care of this patient. Please do not hesitate to call if you have any questions.    I will continue to follow with you and will make

## 2020-08-31 NOTE — PHYSICAL THERAPY NOTE
Att'd to see. Pt occupied with wound care, and glucose to be checked again around 10 am. Was low at 66 this am. Will cont to follow. Att'd again at 10:35, but pt still occupied with wound care.

## 2020-08-31 NOTE — PLAN OF CARE
Assumed care of pt at [address] [place] [address] from [place], [facility]. Pt is ST, on Levo gtt & titrated to maintain SBP within parameters of >90 or MAP > 60. EF 20 - 25%. Neuros intact, A&Ox4. NC at 3L, RT consulted and pt on CPAP at midnight. QID accucheck.  K was 5.9 at 1847, daja

## 2020-08-31 NOTE — PHYSICAL THERAPY NOTE
PHYSICAL THERAPY EVALUATION - INPATIENT     Room Number: 455/455-A  Evaluation Date: 8/31/2020  Type of Evaluation: {PT Type of Evaluation:3823}          Reason for Therapy: Mobility Dysfunction and Discharge Planning    History related to current admi apnea, obstructive on CPAP    HTR as of 4-16-12   • Type II or unspecified type diabetes mellitus without mention of complication, uncontrolled Dx at age 35    Type 2 DM       Past Surgical History  Past Surgical History:   Procedure Laterality Date   • OT these activity recommendations. Exercise/Education Provided:    ***    {PT Treatment Provided:3926}         Eval completed. ASSESSMENT     Patient is a 63year old male admitted on 8/30/2020 for presenting problems above.    Pertinent comorbiditi

## 2020-08-31 NOTE — RESPIRATORY THERAPY NOTE
[facility] - Equipment Use Daily Summary:  · Set Mode CPAP  · Usage in hours: 7:20  · 90% Pressure (EPAP) level: 18  · 90% Insp Pressure (IPAP):   · AHI: 0.1  · Supplemental Oxygen:   · Comments:

## 2020-08-31 NOTE — PHYSICAL THERAPY NOTE
PHYSICAL THERAPY EVALUATION - INPATIENT     Room Number: 455/455-A  Evaluation Date: 8/31/2020  Type of Evaluation: Initial  Physician Order: PT Eval and Treat    Presenting Problem: s/p syncope and fall at home; a-fib/flutter w/RVR; respiratory failur medically managed   • DJD (degenerative joint disease) of knee 2/2/2012    bilat-received Synvisc-One injections to both knees   • High cholesterol    • Hypertension    • Lipid screening 2/23/2011   • Mixed hyperlipidemia    • Morbid obesity ([address]    • Diane Dys different factory work sites. States stairs have not been doable for a while, and is why they moved to the apartment a few years ago. SUBJECTIVE  C/o pain in B knees. No c/o dizziness, nausea. \"I can't move on my own. \"     Patient self-stated goal is tingling in toes, but able to feel light pressure B'ly       ACTIVITY TOLERANCE  Pulse: (!) 129  Heart Rate Source: Monitor     BP: 103/57  BP Location: Right arm  BP Method: Automatic  Patient Position: Lying    O2 WALK  SPO2 on Room Air at Rest: 96 gravity assist, but bed not cooperating. PROM of BLE's within 5% of movement tolerated by pt. Very limited by morbid obesity and pain and edema.    Instructed pt and family (wife wrote down) the towel roll instructions and the following hep: AP's, quad se impairment in mobility. Research supports that patients with this level of impairment may benefit from ABENA; The patient scores 0/20 on the Elderly Mobility Scale, indicating patient is dependent in terms of safe mobility.        Based on this evaluation,

## 2020-08-31 NOTE — PROGRESS NOTES
Assumed care following AM RN report. AO x 3. A little bit off with date/time; reoriented  On 3L NC maintaining sats.  ST w/ BBB on the monitor  On Levophed, titrated to keep SBP 90, MAP 60  Doran to bag with diminished UO  Recent k 5.9, Dr. Maile Kahn notified

## 2020-08-31 NOTE — PLAN OF CARE
Received pt this AM. A&Ox4 with complaints of brian LE pain. PRN medications given throughout shift have helped. CPAP while sleeping otherwise tolerating RA. Levophed weaned off this AM and BP within parameters.  Pt in a flutter (confirmed by cardiology this

## 2020-08-31 NOTE — PROGRESS NOTES
Cardiology Consultation  1 [facility] Patient Status:  Inpatient    1960 MRN [id number]   [facility] 4SW-A Attending Rodger Mclain MD   Hosp Day # 1 PCP Reyna Ely MD         Assessment/Plan:  63 year ol L5-S1 disc herniation after slipping on ice.  This was medically managed   • DJD (degenerative joint disease) of knee 2/2/2012    bilat-received Synvisc-One injections to both knees   • High cholesterol    • Hypertension    • Lipid screening 2/23/2011   Sylvain YOUNG 50 MG Oral Tablet 24 Hr, Take 1 tablet (50 mg total) by mouth daily. , Disp: 90 tablet, Rfl: 3  lisinopril 2.5 MG Oral Tab, Take 1 tablet (2.5 mg total) by mouth once daily. , Disp: 90 tablet, Rfl: 3  SITagliptin-metFORMIN HCl (TIENUMET)  MG Oral Tab, weakness, paresthesias  Psych: denies anxiety, depression  Integument: denies skin rashes or lesions  Heme: denies easy bruising or bleeding  Endo: denies heat/cold intolerance, skin or nail changes      Physical Exam:  Blood pressure 108/57, pulse (!) 131 CREATSERUM 2.45 (H) 08/31/2020    CREATSERUM 2.36 (H) 08/30/2020     Cholesterol:     Lab Results   Component Value Date    CHOLEST 185.00 06/18/2019    CHOLEST 209 (H) 11/30/2018    CHOLEST 149 05/26/2018     Lab Results   Component Value Date    HDL 48. 5

## 2020-08-31 NOTE — OCCUPATIONAL THERAPY NOTE
Attempted to see the patient for OT evaluation. Low glucose level. Re-check at 10 am.  OT will re-visit. Spoke with RN. Follow up with RN around 10:30. Pt is occupied with wound care.

## 2020-08-31 NOTE — PAYOR COMM NOTE
--------------  ADMISSION REVIEW     Payor: DIANNA PPVALERIO  Subscriber #:  [id number]  Authorization Number: [id number]    Admit date: 8/30/20  Admit time: [facility] [address]       Patient Seen in: [facility] Emergency Department    History   Patient presents with:  Lo Mouth: Mucous membranes are moist.   Eyes:      Pupils: Pupils are equal, round, and reactive to light. Neck:      Musculoskeletal: Normal range of motion and neck supple. Cardiovascular:      Rate and Rhythm: Regular rhythm. Tachycardia present.    Pul HGB 9.9 (*)     HCT 32.4 (*)     MCHC 30.6 (*)     RDW 16.4 (*)     RDW-SD 54.1 (*)     Neutrophil Absolute Prelim 15.33 (*)     Neutrophil Absolute 15.33 (*)     Lymphocyte Absolute 0.44 (*)      EKG 8/30/2020 at 3: 25    Rate, intervals and axes as noted potential sources continue her extremity wounds versus UTI. Cath urine shows 5-10 WBCs however rare bacteria. More likely cellulitis lower extremities. Patient was given dose ceftriaxone here in the ED. Lactic acid within normal limits.   Patient also h 1.94 08/30/2020    BUN 37 08/30/2020     08/30/2020    K 5.5 08/30/2020     08/30/2020    CO2 26.0 08/30/2020     08/30/2020    CA 8.6 08/30/2020    ALB 3.1 08/30/2020    ALKPHO 147 08/30/2020    BILT 0.7 08/30/2020    TP 7.6 08/30/2020 fall at home.      # Fall  -mechanical v orthostatic?  -follow on tele  -PT/OT    # aflutter, hx HTN  -reduced metoprolol to 25mg daily per cards  -cont eilquis  -echo    # Hypotension, possible sepsis  -IVF bolus  -BB decreased, holding ace  -blood cxs sen Given 3 g Intravenous Dorothy Aaron RN      cefTRIAXone Sodium (ROCEPHIN) 2 g in sodium chloride 0.9% 100 mL MBP/ADD-vantage     Date Action Dose Route User    8/31/2020 0428 New Bag 2 g Intravenous Johnnie Ramos RN      clindamycin (CLEOCIN) in D5W Given 2 mg Intravenous Daren Carroll RN    8/30/2020 1622 Given 2 mg Intravenous Kirby Garrison RN      norepinephrine (LEVOPHED) 4 mg/250 ml premix infusion     Date Action Dose Route User    8/31/2020 0842 Rate/Dose Change 1 mcg/min Intravenous Odalis Gillis

## 2020-09-01 LAB
ALBUMIN SERPL-MCNC: 2.5 G/DL (ref 3.4–5)
ANION GAP SERPL CALC-SCNC: 7 MMOL/L (ref 0–18)
BUN BLD-MCNC: 45 MG/DL (ref 7–18)
BUN/CREAT SERPL: 26.3 (ref 10–20)
CALCIUM BLD-MCNC: 8.2 MG/DL (ref 8.5–10.1)
CHLORIDE SERPL-SCNC: 108 MMOL/L (ref 98–112)
CO2 SERPL-SCNC: 22 MMOL/L (ref 21–32)
CREAT BLD-MCNC: 1.71 MG/DL (ref 0.7–1.3)
DEPRECATED RDW RBC AUTO: 56.3 FL (ref 35.1–46.3)
ERYTHROCYTE [DISTWIDTH] IN BLOOD BY AUTOMATED COUNT: 16.8 % (ref 11–15)
GLUCOSE BLD-MCNC: 113 MG/DL (ref 70–99)
GLUCOSE BLD-MCNC: 126 MG/DL (ref 70–99)
GLUCOSE BLD-MCNC: 126 MG/DL (ref 70–99)
GLUCOSE BLD-MCNC: 149 MG/DL (ref 70–99)
GLUCOSE BLD-MCNC: 88 MG/DL (ref 70–99)
HAV IGM SER QL: 1.9 MG/DL (ref 1.6–2.6)
HCT VFR BLD AUTO: 30 % (ref 39–53)
HGB BLD-MCNC: 9.1 G/DL (ref 13–17.5)
MCH RBC QN AUTO: 27.7 PG (ref 26–34)
MCHC RBC AUTO-ENTMCNC: 30.3 G/DL (ref 31–37)
MCV RBC AUTO: 91.2 FL (ref 80–100)
OSMOLALITY SERPL CALC.SUM OF ELEC: 295 MOSM/KG (ref 275–295)
PHOSPHATE SERPL-MCNC: 3.2 MG/DL (ref 2.5–4.9)
PLATELET # BLD AUTO: 203 10(3)UL (ref 150–450)
POTASSIUM SERPL-SCNC: 4.9 MMOL/L (ref 3.5–5.1)
POTASSIUM SERPL-SCNC: 5.1 MMOL/L (ref 3.5–5.1)
POTASSIUM SERPL-SCNC: 5.1 MMOL/L (ref 3.5–5.1)
POTASSIUM SERPL-SCNC: 5.2 MMOL/L (ref 3.5–5.1)
RBC # BLD AUTO: 3.29 X10(6)UL (ref 4.3–5.7)
SODIUM SERPL-SCNC: 137 MMOL/L (ref 136–145)
STREPTOCOCCUS DNA BLD POS NAA+NON-PROBE: DETECTED
WBC # BLD AUTO: 10.3 X10(3) UL (ref 4–11)

## 2020-09-01 PROCEDURE — 85027 COMPLETE CBC AUTOMATED: CPT | Performed by: NURSE PRACTITIONER

## 2020-09-01 PROCEDURE — 82962 GLUCOSE BLOOD TEST: CPT

## 2020-09-01 PROCEDURE — 80069 RENAL FUNCTION PANEL: CPT | Performed by: INTERNAL MEDICINE

## 2020-09-01 PROCEDURE — 83735 ASSAY OF MAGNESIUM: CPT | Performed by: INTERNAL MEDICINE

## 2020-09-01 PROCEDURE — 87040 BLOOD CULTURE FOR BACTERIA: CPT | Performed by: PHYSICIAN ASSISTANT

## 2020-09-01 PROCEDURE — 84132 ASSAY OF SERUM POTASSIUM: CPT | Performed by: INTERNAL MEDICINE

## 2020-09-01 RX ORDER — GABAPENTIN 300 MG/1
300 CAPSULE ORAL 2 TIMES DAILY
Status: DISCONTINUED | OUTPATIENT
Start: 2020-09-01 | End: 2020-09-10

## 2020-09-01 RX ORDER — DIGOXIN 0.25 MG/ML
500 INJECTION INTRAMUSCULAR; INTRAVENOUS ONCE
Status: COMPLETED | OUTPATIENT
Start: 2020-09-01 | End: 2020-09-01

## 2020-09-01 NOTE — PLAN OF CARE
Assumed care of pt at 1900  Pt  A&Ox4. Able to follow commands. Pt used cpap all night when asleep. Lung sounds are clear. Afebrile overnight. Aflutter w/ BBB. Pt continues to be on Cardizem drip. Potassium has been trending down 5.1 with morning labs.  No

## 2020-09-01 NOTE — PROGRESS NOTES
[facility]    Nephrology Progress Note    Neida Traore Attending:   Paul Gooden MD     Cc: Stephanie Frances    SUBJECTIVE     On dilt ggt  No other complaints    PHYSICAL EXAM   Vital signs: /54   Pulse 83   Temp 97.3 °F (36.3 °C) (Temporal)   Resp 1 Or  Glucose-Vitamin C (DEX-4) chewable tab 4 tablet, 4 tablet, Oral, Q15 Min PRN    Or  dextrose 50 % injection 50 mL, 50 mL, Intravenous, Q15 Min PRN    Or  glucose (DEX4) oral liquid 30 g, 30 g, Oral, Q15 Min PRN    Or  Glucose-Vitamin C (DEX-4) chewable bowel gas most likely. BLADDER:  The urinary bladder is not visible.     OTHER:  Negative.                               =====    CONCLUSION:             Limited exam.  The bladder is not visible, apparently because of bowel     gas and/or being und INDICATIONS:  bilateral knee pain/numbness         PATIENT STATED HISTORY: (As transcribed by Technologist)  Bilateral knee     pain and numbness. FINDINGS:      BONES:  Osseous structures are intact.   Tricompartmental joint space     narrowi preliminary radiology report from     [facility].                          Dictated by (CST): Raul Goldman MD on 8/30/2020 at 7:38 AM         Finalized by (CST): Raul Goldman MD on 8/30/2020 at 7:40 AM        [address] [id number] ([id number])   Final Result    P PATIENT STATED HISTORY: (As transcribed by Technologist)           FINDINGS:      RIGHT KIDNEY  MEASUREMENTS:  10.5 x 6.0 x 5.8 cm  ECHOGENICITY:  Normal.  HYDRONEPHROSIS:  None.   CYSTS/STONES/MASSES:  Cyst upper pole right kidney superior medial 4.0 call with questions or concerns.        Leland Casas MD  [address] [place] [address] [facility]

## 2020-09-01 NOTE — PROGRESS NOTES
Critical Care Progress Note     Assessment / Plan:  1. Shock: Septic shock 2/2 strep bacteremia. Resolved  - Weaned off pressors on 8/31  - Currently on cefazolin and clinda  - ID consult   - IVF prn   2.  Respiratory insufficiency favor secondary to fluid

## 2020-09-01 NOTE — RESPIRATORY THERAPY NOTE
[facility] Equipment Usage Summary :            Set Mode :CPAP W FLEX          Usage in Hours:16;03          90% Pressure (EPAP) : 18           90% Insp Pressure (IPAP);           AHI : 1          Supplemental Oxygen LPM : 3

## 2020-09-01 NOTE — PAYOR COMM NOTE
--------------  CONTINUED STAY REVIEW    Payor: [facility] PPO  Subscriber #:  [id number]  Authorization Number: [id number]    Admit date: 8/30/20  Admit time: [facility]     Admitting Physician: Mitzy Cook MD  Attending Physician:  MD HELENE Esposito 8/31/2020 0839 Given 100 mg Oral Dorothy Aaron RN      DULoxetine HCl (CYMBALTA) DR particles cap 60 mg     Date Action Dose Route User    8/31/2020 7110 Given 60 mg Oral Dorothy Aaron, TAL      gabapentin (NEURONTIN) cap 300 mg     Date Action Dose R - On eliquis at home. ?if would need different anticoagulation given elevated BMI and weight. - Lasix prn   4. [facility]  - CPAP per protocol   5. Renal insufficiency. - Last Cr 2019 was 1.0  - Rising Cr with urine output  - Nephro on consult   6.  Proph  - O Renaldo Connors Attending:   Raul Chavez MD      Cc: jsose     SUBJECTIVE      uop slightly improved  Off levo this am  No other complaints     PHYSICAL EXAM   Vital signs: /58 (BP Location: Right arm)   Pulse 118   Temp 98.3 °F (36.8 °C) (Tempora Glucose-Vitamin C (DEX-4) chewable tab 4 tablet, 4 tablet, Oral, Q15 Min PRN    Or  dextrose 50 % injection 50 mL, 50 mL, Intravenous, Q15 Min PRN    Or  glucose (DEX4) oral liquid 30 g, 30 g, Oral, Q15 Min PRN    Or  Glucose-Vitamin C (DEX-4) chewable tab MEASUREMENTS:  10.5 x 6.0 x 5.8 cm    ECHOGENICITY:  Normal.    HYDRONEPHROSIS:  None. CYSTS/STONES/MASSES:  Cyst upper pole right kidney superior medial 4.0 x     4.4 x 4.7 cm having simple appearance. LEFT KIDNEY     No left kidney visible. Finalized by (CST): Mike Garcia MD on 8/30/2020 at 7:45 AM        XR KNEE (1 OR 2 VIEWS), RIGHT (CPT=73560)   Final Result    PROCEDURE:  XR KNEE (1 OR 2 VIEWS), RIGHT (CPT=73560)         COMPARISON:  [facility] , XR, KNEE (AP   LATERAL),RIGHT XRAY, 10/26/201 excluded, lateral projection for further evaluation as clinically     indicated.                               =====    CONCLUSION:      1.  Cardiomegaly with pulmonary vasculature redistribution and increased     interstitial opacities, correlate clinica Finalized by (CST): Jailene Napier MD on 8/30/2020 at 9:43 AM              ASSESSMENT & PLAN   This is a 59 year old male with history of  HTN, DM, bilateral lower extremity cellulitis/ wounds, recent diagnosis of aflutter/ atrial fibrillation.  Presente

## 2020-09-01 NOTE — CM/SW NOTE
09/01/20 1500   CM/SW Referral Data   Referral Source Physician   Reason for Referral Discharge planning   Informant Spouse   Social History   Recreational Drug/Alcohol Use no   Major Changes Last 6 Months no   Domestic/Partner Violence no   Suicidal Id

## 2020-09-01 NOTE — PROGRESS NOTES
[facility] Hospitalist Progress Note     PCP: Maykel Acevedo MD    Chief Complaint: follow-up    Overnight/Interim Events:      SUBJECTIVE:  Pt laying in bed, using cpap while napping. Blisters noted on L knee/R hip area.      106/54, Hr 94, on dilt gtt @5mg/hr, --    * 116*  --  90 66*  --   --   --   --  88  --     < > = values in this interval not displayed.        Recent Labs   Lab 08/30/20  0154 08/31/20  0418 09/01/20  0420   ALT 15* 14*  --    AST 24 40*  --    ALB 3.1* 3.0* 2.5*       Recent Labs overload, less likely PNA, no marked cough/fevers  -got lasix x1, BNP 6k  -difficult as got fluid for hypoTN  -IV abx as above   -urine strep/legionella neg, sputum cx     # Acute renal failure  -likely pre-renal but difficult fluid balance, will ask renal

## 2020-09-01 NOTE — PROGRESS NOTES
Cardiology Consultation  1 [facility] Patient Status:  Inpatient    1960 MRN [id number]   [facility] 4SW-A Attending Rodger Mclain MD   Hosp Day # 2 PCP Reyna Ely MD         Assessment/Plan:  63 year ol • Diabetes ([facility] [address]    • Diabetic peripheral neuropathy associated with type 2 diabetes mellitus ([facility] [address] 4/6/2012    by exam and symptoms   • Disc herniation Dx in 1/2012    L5-S1 disc herniation after slipping on ice.  This was medically managed   • DJD taisha medications on file prior to encounter. apixaban 5 MG Oral Tab, Take 1 tablet (5 mg total) by mouth 2 (two) times daily. , Disp: 180 tablet, Rfl: 3  Metoprolol Succinate ER 50 MG Oral Tablet 24 Hr, Take 1 tablet (50 mg total) by mouth daily. , Disp: 90 tab cough, wheeze  GI: denies n/v, abd pain, diarrhea or constipation  : denies hematuria, dysuria, incontinence  MSK: denies muscle or joint pains  Neuro: denies numbness, weakness, paresthesias  Psych: denies anxiety, depression  Integument: denies skin ra 08/31/2020    BUN 45 (H) 08/31/2020     Lab Results   Component Value Date    CREATSERUM 1.71 (H) 09/01/2020    CREATSERUM 2.05 (H) 08/31/2020    CREATSERUM 2.45 (H) 08/31/2020     Cholesterol:     Lab Results   Component Value Date    Alessio Din 185.00 06/18

## 2020-09-01 NOTE — PROGRESS NOTES
[facility] Pharmacy Note:  Renal Adjustment for cefazolin (ANCEF)    Jack Workman is a 63year old patient who has been prescribed cefazolin (ANCEF) 3000 mg every 12 hrs. CrCl is estimated creatinine clearance is 48.3 mL/min (A) (based on SCr of 1.71 mg/dL (H)).

## 2020-09-01 NOTE — PAYOR COMM NOTE
--------------  CONTINUED STAY REVIEW    Payor: [facility] PPO  Subscriber #:  [id number]  Authorization Number: [id number]    Admit date: 8/30/20  Admit time: [address] [place] [address]    Admitting Physician: Chanel De La Rosa MD  Attending Physician:  MD HELENE Alexis 9/1/2020 1276 Given 60 mg Oral Krzysztof Natarajan RN      gabapentin (NEURONTIN) cap 300 mg     Date Action Dose Route User    8/31/2020 2202 Given 300 mg Oral Lin Weinberg RN      HYDROmorphone HCl (DILAUDID) 1 MG/ML injection 0.4 mg     Date Ac No acute events overnight. Off pressors.  No significant cough.     Objective:  Vitals     09/01/20  0300 09/01/20  0400 09/01/20  0500 09/01/20  0617   BP: 117/58 106/57 101/62 104/59   BP Location:     Right arm     Pulse: 103 98 100 100   Resp: 13 13 13

## 2020-09-01 NOTE — PROGRESS NOTES
INFECTIOUS DISEASE PROGRESS NOTE    Cade Echevarria Patient Status:  Inpatient    1960 MRN [id number]   [facility] 4SW-A Attending Liana Peters MD   [facility] Day # 2 PCP Lexie Mathew Glucose-Vitamin C (DEX-4) chewable tab 8 tablet, 8 tablet, Oral, Q15 Min PRN  •  Insulin Aspart Pen (NOVOLOG) 100 UNIT/ML flexpen 2-10 Units, 2-10 Units, Subcutaneous, TID CC and HS  •  HYDROcodone-acetaminophen (NORCO) 5-325 MG per tab 1-2 tablet, 1-2 tab 17.0* 16.8*   NEPRELIM 15.33* 12.71*  --    WBC 16.8* 15.3* 10.3   .0 231.0 203.0     Recent Labs   Lab 08/30/20  0154  08/31/20  0418 08/31/20  0754  08/31/20  2104 09/01/20  0038 09/01/20  0420   *   < > 90 66*  --   --   --  88   BUN 37* gas most likely. BLADDER: The urinary bladder is not visible. OTHER: Negative. Impression: CONCLUSION:     Limited exam. The bladder is not visible, apparently because of bowel gas and/or being under distended. . The right kidney is seen, showing a s

## 2020-09-02 LAB
ALBUMIN SERPL-MCNC: 2.2 G/DL (ref 3.4–5)
ANION GAP SERPL CALC-SCNC: 7 MMOL/L (ref 0–18)
BUN BLD-MCNC: 40 MG/DL (ref 7–18)
BUN/CREAT SERPL: 30.8 (ref 10–20)
CALCIUM BLD-MCNC: 8.7 MG/DL (ref 8.5–10.1)
CHLORIDE SERPL-SCNC: 109 MMOL/L (ref 98–112)
CO2 SERPL-SCNC: 20 MMOL/L (ref 21–32)
CREAT BLD-MCNC: 1.3 MG/DL (ref 0.7–1.3)
DEPRECATED RDW RBC AUTO: 55.4 FL (ref 35.1–46.3)
ERYTHROCYTE [DISTWIDTH] IN BLOOD BY AUTOMATED COUNT: 16.1 % (ref 11–15)
GLUCOSE BLD-MCNC: 128 MG/DL (ref 70–99)
GLUCOSE BLD-MCNC: 135 MG/DL (ref 70–99)
GLUCOSE BLD-MCNC: 142 MG/DL (ref 70–99)
GLUCOSE BLD-MCNC: 158 MG/DL (ref 70–99)
GLUCOSE BLD-MCNC: 160 MG/DL (ref 70–99)
HAV IGM SER QL: 2 MG/DL (ref 1.6–2.6)
HCT VFR BLD AUTO: 34 % (ref 39–53)
HGB BLD-MCNC: 10 G/DL (ref 13–17.5)
MCH RBC QN AUTO: 27.7 PG (ref 26–34)
MCHC RBC AUTO-ENTMCNC: 29.4 G/DL (ref 31–37)
MCV RBC AUTO: 94.2 FL (ref 80–100)
OSMOLALITY SERPL CALC.SUM OF ELEC: 294 MOSM/KG (ref 275–295)
PHOSPHATE SERPL-MCNC: 3 MG/DL (ref 2.5–4.9)
PLATELET # BLD AUTO: 219 10(3)UL (ref 150–450)
POTASSIUM SERPL-SCNC: 4.8 MMOL/L (ref 3.5–5.1)
RBC # BLD AUTO: 3.61 X10(6)UL (ref 4.3–5.7)
SODIUM SERPL-SCNC: 136 MMOL/L (ref 136–145)
WBC # BLD AUTO: 8.2 X10(3) UL (ref 4–11)

## 2020-09-02 PROCEDURE — 82962 GLUCOSE BLOOD TEST: CPT

## 2020-09-02 PROCEDURE — 80069 RENAL FUNCTION PANEL: CPT | Performed by: INTERNAL MEDICINE

## 2020-09-02 PROCEDURE — 97110 THERAPEUTIC EXERCISES: CPT

## 2020-09-02 PROCEDURE — 97165 OT EVAL LOW COMPLEX 30 MIN: CPT

## 2020-09-02 PROCEDURE — 83735 ASSAY OF MAGNESIUM: CPT | Performed by: INTERNAL MEDICINE

## 2020-09-02 PROCEDURE — 85027 COMPLETE CBC AUTOMATED: CPT | Performed by: NURSE PRACTITIONER

## 2020-09-02 RX ORDER — DOCUSATE SODIUM 100 MG/1
100 CAPSULE, LIQUID FILLED ORAL 2 TIMES DAILY
Status: DISCONTINUED | OUTPATIENT
Start: 2020-09-02 | End: 2020-09-10

## 2020-09-02 RX ORDER — DIGOXIN 125 MCG
250 TABLET ORAL DAILY
Status: DISCONTINUED | OUTPATIENT
Start: 2020-09-02 | End: 2020-09-07

## 2020-09-02 RX ORDER — BISACODYL 10 MG
10 SUPPOSITORY, RECTAL RECTAL
Status: DISCONTINUED | OUTPATIENT
Start: 2020-09-02 | End: 2020-09-02

## 2020-09-02 NOTE — PLAN OF CARE
Assumed care of patient at 7am  Patient has difficulty moving in the bed due to the size of patient and the bed.  New bed ordered and awaiting for delivery  Patient Is AOx4 ; complains of knee pain and refuses to have legs elevated due to knee pain; medicat oxygenation  Description  INTERVENTIONS:  - Assess for changes in respiratory status  - Assess for changes in mentation and behavior  - Position to facilitate oxygenation and minimize respiratory effort  - Oxygen supplementation based on oxygen saturation

## 2020-09-02 NOTE — DIETARY NOTE
[facility]     Admitting diagnosis:  Hyperkalemia [E87.5]  Tachycardia [R00.0]  Acute kidney injury ([facility] [address] [N17.9]  Fall, initial encounter [W19. XXXA]  Hypotension, unspecified hypotension type [I95.9]    Ht: 180.3

## 2020-09-02 NOTE — WOUND PROGRESS NOTE
[facility]  Report of Inpatient Wound Care Progress Note    Jeff Crews Patient Status:  Inpatient    1960 MRN [id number]   [facility] 2NE-A Attending Silas Florez MD   Hosp Day # 3 PCP Justin Montelongo MD       SUBJECTIVE: Born blind   • Other (Other) Brother         Smoker   • Thyroid Disorder Neg      Past Surgical History:   Procedure Laterality Date   • OTHER SURGICAL HISTORY      Root canals     Social History    Tobacco Use      Smoking status: Never Smoker      Smokel Blister drained, serous fluid. No odor noted. Bilateral wraps removed. Left leg appears red and swollen. Hydrofera dressings left in place. Blister covered with xeroform and ABD pads. Bilateral lower legs wrapped with kerlix, secured with tape.       PLAN O

## 2020-09-02 NOTE — CM/SW NOTE
Sw met with pt today- briefly discussed ABENA. Pt very uncomfortable - states his bed is broken and he is stuck in an uncomfortable position. Accepting facilities are [facility] and [facility]. Trying to connect with wife to discuss.     Neha Cervantes,

## 2020-09-02 NOTE — PROGRESS NOTES
INFECTIOUS DISEASE PROGRESS NOTE    Earl Posey Patient Status:  Inpatient    1960 MRN [id number]   [facility] 4SW-A Attending Ashley Roca MD   [facility] Day # 3 PCP Rosalba Frank **OR** glucose (DEX4) oral liquid 30 g, 30 g, Oral, Q15 Min PRN **OR** Glucose-Vitamin C (DEX-4) chewable tab 8 tablet, 8 tablet, Oral, Q15 Min PRN  •  Insulin Aspart Pen (NOVOLOG) 100 UNIT/ML flexpen 2-10 Units, 2-10 Units, Subcutaneous, TID CC and HS  • RDW 16.4* 17.0* 16.8* 16.1*   NEPRELIM 15.33* 12.71*  --   --    WBC 16.8* 15.3* 10.3 8.2   .0 231.0 203.0 219.0     Recent Labs   Lab 08/30/20  0154  08/31/20  0418 08/31/20  0754  09/01/20  0420 09/01/20  0922 09/02/20  0709   *   < > 90 unable to visualize left kidney. 8. Chronic LE lymphedema  9.  Morbid obesity, BMI 70.75    PLAN:  - continue to cover with Ancef, will d/c clinda  - follow repeat BCx 9/1 to doc clearance, negative so far  - follow temps and wbc  - monitor leg clinically

## 2020-09-02 NOTE — RESPIRATORY THERAPY NOTE
[facility] Equipment Usage Summary :            Set Mode :CPAP W FLEX          Usage in Hours:11;00          90% Pressure (EPAP) : 18           90% Insp Pressure (IPAP);           AHI : 1.7

## 2020-09-02 NOTE — OCCUPATIONAL THERAPY NOTE
OCCUPATIONAL THERAPY EVALUATION - INPATIENT     Room Number: 2472/5760-Y  Evaluation Date: 9/2/2020  Type of Evaluation: Initial  Presenting Problem: fall    Physician Order: IP Consult to Occupational Therapy  Reason for Therapy: ADL/IADL Dysfunction and screening 2/23/2011   • Mixed hyperlipidemia    • Morbid obesity ([facility] [address]    • Osteoarthritis    • Osteoarthritis, localized, knee 10/26/2011    left   • Renal insufficiency, mild July 2010    Was hospitalized due to dehydration and had acute renal insufficie reading    PERCEPTION  Overall Perception Status:   WFL - within functional limits    SENSATION  Light touch:  intact    Communication: Pt able to communicate basic wants and needs.     Behavioral/Emotional/Social: Pt is pleasant and engaged in therapy madonna Patient End of Session: In bed;Needs met;Call light within reach;RN aware of session/findings;Bracing education provided; All patient questions and concerns addressed; Alarm set    ASSESSMENT     Patient is a 63year old male admitted on 8/30/2020 for fall reeducation; Compensatory technique education;Continued evaluation  Rehab Potential : Fair  Frequency (Obs): 3-5x/week  Number of Visits to Meet Established Goals: 5    ADL Goals   Patient will perform upper body dressing:  with min assist  Patient will per

## 2020-09-02 NOTE — CM/SW NOTE
Spoke to . Leihgann Mao. She states she received a call from [facility]   and whoever called sounded like a used care salesman so she will not have him go there. [facility] is other accepting facility. Emailed her [address] list and- will send more referrals out.

## 2020-09-02 NOTE — PHYSICAL THERAPY NOTE
PHYSICAL THERAPY TREATMENT NOTE - INPATIENT    Room Number: 4771/3448-L     Session: 1  Number of Visits to Meet Established Goals: 6    Presenting Problem: s/p syncope and fall at home; a-fib/flutter w/RVR; respiratory failure; septic shock w/suspected s pop. I don't think that was there when wound care came on Monday. \"    Patient’s self-stated goal is to get up and sit in a chair. OBJECTIVE  Precautions:  Other (Comment)(rossy; bariatric bed; morbid obesity)    WEIGHT BEARING RESTRICTION  Weight Beari large blister, size of a grapefruit on the medial aspect of the L leg, proximal to the wraps. RN and wound care notified. Discussed with pt the importance of mobilizing the joints, specifically the knees to avoid joint stiffness and irritation.  Pt in u GOALS   Goal #1 Patient is able to participate in functional mobility assessment. Goal #2 Patient is able to demonstrate 25% of WNL with AROM in supine.       Goal #3      Goal #4    Goal #5    Goal #6    Goal Comments: Goals established on 8/31/2020,

## 2020-09-02 NOTE — PROGRESS NOTES
[facility]    Nephrology Progress Note    Earl Posey Attending:   Thee Mar MD     Cc: Eliane Storm    SUBJECTIVE     On dilt ggt  Not wanting to eat much lunch  No other complaints    PHYSICAL EXAM   Vital signs: /47 (BP Location: Right arm) Intravenous, Q6H PRN  glucose (DEX4) oral liquid 15 g, 15 g, Oral, Q15 Min PRN    Or  Glucose-Vitamin C (DEX-4) chewable tab 4 tablet, 4 tablet, Oral, Q15 Min PRN    Or  dextrose 50 % injection 50 mL, 50 mL, Intravenous, Q15 Min PRN    Or  glucose (DEX4) o This appears secondary to bowel gas most likely. BLADDER:  The urinary bladder is not visible.     OTHER:  Negative.                               =====    CONCLUSION:             Limited exam.  The bladder is not visible, apparently because of jason 10/26/2011,     12:45 PM.         INDICATIONS:  bilateral knee pain/numbness         PATIENT STATED HISTORY: (As transcribed by Technologist)  Bilateral knee     pain and numbness. FINDINGS:      BONES:  Osseous structures are intact.   [facility] notified of these findings with preliminary radiology report from     [place] services.                          Dictated by (CST): Raul Goldman MD on 8/30/2020 at 7:38 AM         Finalized by (CST): Raul Goldman MD on 8/30/2020 at 7:40 AM        CT BRAIN OR Routine technique was utilized. PATIENT STATED HISTORY: (As transcribed by Technologist)           FINDINGS:      RIGHT KIDNEY  MEASUREMENTS:  10.5 x 6.0 x 5.8 cm  ECHOGENICITY:  Normal.  HYDRONEPHROSIS:  None.   CYSTS/STONES/MASSES:  Cyst upper pole hesitate to call with questions or concerns.        Daina Choi MD  [address] Nephrology

## 2020-09-02 NOTE — PROGRESS NOTES
Cardiology Consultation  [facility] Patient Status:  Inpatient    1960 MRN [id number]   [facility] 4SW-A Attending Naldo John MD   Hosp Day # 3 PCP Arnoldo Clancy MD         Assessment/Plan:  63 year ol associated with type 2 diabetes mellitus ([facility] [address] 4/6/2012    by exam and symptoms   • Disc herniation Dx in 1/2012    L5-S1 disc herniation after slipping on ice.  This was medically managed   • DJD (degenerative joint disease) of knee 2/2/2012    tracy Oral Tab, Take 1 tablet (5 mg total) by mouth 2 (two) times daily. , Disp: 180 tablet, Rfl: 3  Metoprolol Succinate ER 50 MG Oral Tablet 24 Hr, Take 1 tablet (50 mg total) by mouth daily. , Disp: 90 tablet, Rfl: 3  lisinopril 2.5 MG Oral Tab, Take 1 tablet ( constipation  : denies hematuria, dysuria, incontinence  MSK: denies muscle or joint pains  Neuro: denies numbness, weakness, paresthesias  Psych: denies anxiety, depression  Integument: denies skin rashes or lesions  Heme: denies easy bruising or bleedi Lab Results   Component Value Date    CREATSERUM 1.30 09/02/2020    CREATSERUM 1.71 (H) 09/01/2020    CREATSERUM 2.05 (H) 08/31/2020     Cholesterol:     Lab Results   Component Value Date    CHOLEST 185.00 06/18/2019    CHOLEST 209 (H) 11/30/2018    C

## 2020-09-02 NOTE — PROGRESS NOTES
[facility] Hospitalist Progress Note     PCP: Ran Moreno MD    Chief Complaint: follow-up    Overnight/Interim Events:      SUBJECTIVE:  Pt laying in bed, using cpap while napping.      on dilt gtt @7.5mg/hr, 100/47, 84    OBJECTIVE:  Temp:  [97.6 °F (36.4 °C 135*    < > = values in this interval not displayed.        Recent Labs   Lab 08/30/20  0154 08/31/20  0418 09/01/20  0420 09/02/20  0709   ALT 15* 14*  --   --    AST 24 40*  --   --    ALB 3.1* 3.0* 2.5* 2.2*       Recent Labs   Lab 09/01/20  1159 09/01/2 PNA, no marked cough/fevers  -got lasix x1, BNP 6k  -difficult as got fluid for hypoTN  -IV abx as above   -urine strep/legionella neg, sputum cx     # Acute renal failure  -likely pre-renal d/t dec renal perfusion c hypoTN  -difficult fluid balance, appre

## 2020-09-02 NOTE — PLAN OF CARE
Assumed care of patient at [address] [place] [address]. Patient resting in bed on a bariatric bed. RA, clear, diminished lung sounds. A/O x4. A flutter at controlled rate, cardizem drip running. Patient able to wiggle toes but states it is too painful for him to move his legs.  B

## 2020-09-02 NOTE — PAYOR COMM NOTE
--------------  CONTINUED STAY REVIEW    Payor: DIANNA HELTON  Subscriber #:  [id number]  Authorization Number: [id number]    Admit date: 8/30/20  Admit time: 1898    FAXING CLINICAL UPDATE FOR 9/2/20 9/2/20  CARDIOLOGY NOTE:  Assessment/Plan:  63year old kg)  08/28/20 : (!) 425 lb (192.8 kg)  06/22/19 : (!) 425 lb (192.8 kg)       General: awake, alert, oriented x 3  HEENT: at/nc, perrl, eomi  Neck: No JVD, carotids 2+ no bruits.   Cardiac: Regular rate and rhythm tachy , S1, S2 normal, no murmur, rub or ga Action Dose Route User    9/2/2020 1211 Given 3 g Intravenous Dorie Toledo RN    9/2/2020 2823 Given 3 g Intravenous [facility], [address] [place] [address]    9/1/2020 1806 Given 3 g Intravenous Jarret YOUNG RN      clindamycin (CLEOCIN) in D5W 900mg/50ml premi detemir (LEVEMIR) 100 UNIT/ML flextouch 10 Units     Date Action Dose Route User    9/2/2020 0940 Given 10 Units Subcutaneous (Right Upper Arm) Claudia Gagnon RN      Metoprolol Succinate ER (Toprol XL) 24 hr tab 25 mg     Date Action Dose Route User    9/2

## 2020-09-02 NOTE — PLAN OF CARE
9/2/2020    A&Ox4  RA  Denies any pain or sob at this time  [facility]- cpap @ HS  Doran catheter from this admission  Afib on tele- on Eliquis @   Cardizem gtt @ 7.5cc/hr  PT/OT on consult- pt normally up ad dean at home but hasn't walked for 6 weeks per pt d/t

## 2020-09-02 NOTE — PROGRESS NOTES
Received pt from icu. Alert. Oriented. Very pleasant. C/o brian knee/leg pain. Dilaudid given as ordered. Dressing/compression ace wrap noted on ble. A blister noed on the lt leg- oozing. Doran cath noted. On card gtt. afib per tele. Hr 80s.  Denies sob at pr

## 2020-09-03 ENCOUNTER — ANESTHESIA EVENT (OUTPATIENT)
Dept: INTERVENTIONAL RADIOLOGY/VASCULAR | Facility: HOSPITAL | Age: 60
DRG: 871 | End: 2020-09-03
Payer: COMMERCIAL

## 2020-09-03 LAB
ALBUMIN SERPL-MCNC: 2.5 G/DL (ref 3.4–5)
ANION GAP SERPL CALC-SCNC: 4 MMOL/L (ref 0–18)
BUN BLD-MCNC: 39 MG/DL (ref 7–18)
BUN/CREAT SERPL: 35.8 (ref 10–20)
CALCIUM BLD-MCNC: 8.4 MG/DL (ref 8.5–10.1)
CHLORIDE SERPL-SCNC: 109 MMOL/L (ref 98–112)
CO2 SERPL-SCNC: 26 MMOL/L (ref 21–32)
CREAT BLD-MCNC: 1.09 MG/DL (ref 0.7–1.3)
DEPRECATED RDW RBC AUTO: 52.7 FL (ref 35.1–46.3)
ERYTHROCYTE [DISTWIDTH] IN BLOOD BY AUTOMATED COUNT: 16.1 % (ref 11–15)
GLUCOSE BLD-MCNC: 160 MG/DL (ref 70–99)
GLUCOSE BLD-MCNC: 163 MG/DL (ref 70–99)
GLUCOSE BLD-MCNC: 165 MG/DL (ref 70–99)
GLUCOSE BLD-MCNC: 207 MG/DL (ref 70–99)
HAV IGM SER QL: 1.9 MG/DL (ref 1.6–2.6)
HCT VFR BLD AUTO: 33.6 % (ref 39–53)
HGB BLD-MCNC: 10.5 G/DL (ref 13–17.5)
MCH RBC QN AUTO: 27.8 PG (ref 26–34)
MCHC RBC AUTO-ENTMCNC: 31.3 G/DL (ref 31–37)
MCV RBC AUTO: 88.9 FL (ref 80–100)
OSMOLALITY SERPL CALC.SUM OF ELEC: 301 MOSM/KG (ref 275–295)
PHOSPHATE SERPL-MCNC: 2.3 MG/DL (ref 2.5–4.9)
PLATELET # BLD AUTO: 273 10(3)UL (ref 150–450)
POTASSIUM SERPL-SCNC: 4.9 MMOL/L (ref 3.5–5.1)
POTASSIUM SERPL-SCNC: 5.4 MMOL/L (ref 3.5–5.1)
RBC # BLD AUTO: 3.78 X10(6)UL (ref 4.3–5.7)
SODIUM SERPL-SCNC: 139 MMOL/L (ref 136–145)
WBC # BLD AUTO: 9.5 X10(3) UL (ref 4–11)

## 2020-09-03 PROCEDURE — 80069 RENAL FUNCTION PANEL: CPT | Performed by: INTERNAL MEDICINE

## 2020-09-03 PROCEDURE — 97530 THERAPEUTIC ACTIVITIES: CPT

## 2020-09-03 PROCEDURE — 82962 GLUCOSE BLOOD TEST: CPT

## 2020-09-03 PROCEDURE — 84132 ASSAY OF SERUM POTASSIUM: CPT | Performed by: INTERNAL MEDICINE

## 2020-09-03 PROCEDURE — 83735 ASSAY OF MAGNESIUM: CPT | Performed by: INTERNAL MEDICINE

## 2020-09-03 PROCEDURE — 85027 COMPLETE CBC AUTOMATED: CPT | Performed by: INTERNAL MEDICINE

## 2020-09-03 PROCEDURE — 97110 THERAPEUTIC EXERCISES: CPT

## 2020-09-03 NOTE — RESPIRATORY THERAPY NOTE
[facility] Equipment Usage Summary :            Set Mode : CPAP W FLEX          Usage in Hours:11;36          90% Pressure (EPAP) : 18           90% Insp Pressure (IPAP);           AHI : 0.7

## 2020-09-03 NOTE — PROGRESS NOTES
[facility]    Nephrology Progress Note    Ramy Bhandari Attending:   Kristen Rodriguez MD     Cc: Vasile Adams    SUBJECTIVE     No SOB  No other complaints    PHYSICAL EXAM   Vital signs: /70 (BP Location: Right arm)   Pulse 118   Temp 97.5 °F (36.4 °C Oral, Q6H PRN    Or  ondansetron HCl (ZOFRAN) injection 4 mg, 4 mg, Intravenous, Q6H PRN  glucose (DEX4) oral liquid 15 g, 15 g, Oral, Q15 Min PRN    Or  Glucose-Vitamin C (DEX-4) chewable tab 4 tablet, 4 tablet, Oral, Q15 Min PRN    Or  dextrose 50 % inje simple appearance. LEFT KIDNEY     No left kidney visible. This appears secondary to bowel gas most likely. BLADDER:  The urinary bladder is not visible.     OTHER:  Negative.                               =====    CONCLUSION:             L COMPARISON:  [facility] , XR, KNEE (AP   LATERAL),RIGHT XRAY, 10/26/2011,     12:45 PM.         INDICATIONS:  bilateral knee pain/numbness         PATIENT STATED HISTORY: (As transcribed by Technologist)  Bilateral knee     pain and numbness.                FIN interstitial opacities, correlate clinically. ED M.D. notified of these findings with preliminary radiology report from     [facility] services.                          Dictated by (CST): Tara Fox MD on 8/30/2020 at 7:38 AM         Finalized by PERLA imaging of the bilateral kidneys and bladder was performed. Routine technique was utilized.        PATIENT STATED HISTORY: (As transcribed by Technologist)           FINDINGS:      RIGHT KIDNEY  MEASUREMENTS:  10.5 x 6.0 x 5.8 cm  ECHOGENICITY:  Normal.  H pulm     Anemia   - trend Hb, blood transfusions per primary       Aflutter  - per cardiology. On dilt ggt      D/w RN    Thank you for allowing me to participate in the care of this patient. Please do not hesitate to call with questions or concerns.

## 2020-09-03 NOTE — PLAN OF CARE
Pt alert and oriented x4. On Cpap at night. Denies any SOB or chest pain. A fib on tele. Denies pain. Doran in place. Had BM overnight. PT/OT to see pt today.      Problem: Diabetes/Glucose Control  Goal: Glucose maintained within prescribed range  Descript supplementation based on oxygen saturation or ABGs  - Provide Smoking Cessation handout, if applicable  - Encourage broncho-pulmonary hygiene including cough, deep breathe, Incentive Spirometry  - Assess the need for suctioning and perform as needed  - Ass of infection  Description  INTERVENTIONS:  - Assess and document risk factors for pressure ulcer development  - Assess and document skin integrity  - Assess and document dressing/incision, wound bed, drain sites and surrounding tissue  - Implement wound ca reinforce with patient and family use of appropriate assistive device and precautions (e.g. spinal or hip dislocation precautions)  Outcome: Progressing     Problem: Impaired Activities of Daily Living  Goal: Achieve highest/safest level of independence in toileting schedule  Outcome: Progressing

## 2020-09-03 NOTE — CM/SW NOTE
Sw received call from wife. She would like [employer] [facility] [place]. Sw reserved in [place] and asked [facility]al [facility] to reach out to wife [phone]. They will need to get insurance auth. Need to update them on estimated dc date.     Renea Logan, MALAIKA  Soci

## 2020-09-03 NOTE — WOUND PROGRESS NOTE
[facility]  Report of Inpatient Wound Care Progress Note    Blaire Yousif Patient Status:  Inpatient    1960 MRN [id number]   [facility] 2NE-A Attending Christian Shaw, [address] [place] Day # 4 PCP Noe Vo MD       SUBJECTIVE (Other) Sister         Born blind   • Other (Other) Brother         Smoker   • Thyroid Disorder Neg      Past Surgical History:   Procedure Laterality Date   • OTHER SURGICAL HISTORY      Root canals     Social History    Tobacco Use      Smoking status: N improving. Blister to left leg still present, has decreased in size overnight. Still draining a small amount, drainage is serous, no odor present. Bilateral legs cleansed with normal saline.  Patient refusing wraps at this time, states that they are hea

## 2020-09-03 NOTE — PROGRESS NOTES
INFECTIOUS DISEASE PROGRESS NOTE    Lorenda Earline Patient Status:  Inpatient    1960 MRN [id number]   [facility] 4SW-A Attending Niko Oleary MD   [facility] Day # 4 ELODIA Godinez •  glucose (DEX4) oral liquid 15 g, 15 g, Oral, Q15 Min PRN **OR** Glucose-Vitamin C (DEX-4) chewable tab 4 tablet, 4 tablet, Oral, Q15 Min PRN **OR** dextrose 50 % injection 50 mL, 50 mL, Intravenous, Q15 Min PRN **OR** glucose (DEX4) oral liquid 30 g, 30 Laboratory Data:    Recent Labs   Lab 08/30/20  0154 08/31/20  0418 09/01/20  0422 09/02/20  0709 09/03/20  0611   RBC 3.58* 3.50* 3.29* 3.61* 3.78*   HGB 9.9* 9.7* 9.1* 10.0* 10.5*   HCT 32.4* 32.5* 30.0* 34.0* 33.6*   MCV 90.5 92.9 91.2 94.2 88.9   MCH 2 3. LLE cellulitis- no target DNA detected, secondary to GGS  4. Resp failure- agree more likely due to fluid OL rather than pna. Resolved. On room air currently/CPAP nightly. 5. Leukocytosis due to above. Resolved. Urine culture negative.   6. LE wounds-

## 2020-09-03 NOTE — ANESTHESIA PREPROCEDURE EVALUATION
PRE-OP EVALUATION    Patient Name: Murphy Washington    Pre-op Diagnosis: Atrial fib/flutter    Procedure(s):  JOSE with cardioversion  patient is 235 kg    Surgeon(s) and Role:     * Carlo Khan MD - Primary    Pre-op vitals reviewed.   Temp: 98.1 ° Once  [COMPLETED] fentaNYL citrate (SUBLIMAZE) 0.05 MG/ML injection 50 mcg, 50 mcg, Intravenous, Once  [COMPLETED] cefTRIAXone Sodium (ROCEPHIN) 2 g in sodium chloride 0.9% 100 mL MBP/ADD-vantage, 2 g, Intravenous, Once  acetaminophen (TYLENOL) tab 650 mg, 1 tablet (50 mg total) by mouth daily. , Disp: 90 tablet, Rfl: 3  lisinopril 2.5 MG Oral Tab, Take 1 tablet (2.5 mg total) by mouth once daily. , Disp: 90 tablet, Rfl: 3  SITagliptin-metFORMIN HCl (JANUMET)  MG Oral Tab, TAKE ONE TABLET BY MOUTH TWO T 09/03/2020    RBC 3.78 (L) 09/03/2020    HGB 10.5 (L) 09/03/2020    HCT 33.6 (L) 09/03/2020    MCV 88.9 09/03/2020    MCH 27.8 09/03/2020    MCHC 31.3 09/03/2020    RDW 16.1 (H) 09/03/2020    .0 09/03/2020     Lab Results   Component Value Date    N

## 2020-09-03 NOTE — PROGRESS NOTES
[facility] Hospitalist Progress Note     PCP: Carrie Mendez MD    Chief Complaint: follow-up    Overnight/Interim Events:      SUBJECTIVE:  Pt laying in bed,  No current cp/sob/n/v/f/c, palpitations.  +flatus, +urinating  Currently on dilt gtt    OBJECTIVE:  Tem < > = values in this interval not displayed.        Recent Labs   Lab 08/30/20  0154 08/31/20  0418 09/01/20  0420 09/02/20  0709 09/03/20  0611   ALT 15* 14*  --   --   --    AST 24 40*  --   --   --    ALB 3.1* 3.0* 2.5* 2.2* 2.5*       Recent Labs   Lab improved)  -was on 4L NC  -some fluid overload, less likely PNA, no marked cough/fevers  -got lasix x1, BNP 6k  -difficult as got fluid for hypoTN  -IV abx as above   -urine strep/legionella neg, sputum cx     # Acute renal failure-nearly resolved  -likely

## 2020-09-03 NOTE — PLAN OF CARE
9/3/2020       A&Ox4  RA  Denies any pain or sob at this time  [facility]- cpap @ [facility]  Doran catheter from this admission  AfibAflutter on tele- on [place] @   Cardizem gtt @ 7.5cc/hr  PT/OT on consult- pt normally up ad dean at home but hasn't walked for 6 weeks

## 2020-09-03 NOTE — PHYSICAL THERAPY NOTE
PHYSICAL THERAPY TREATMENT NOTE - INPATIENT    Room Number: 1113/7562-Q     Session: 2   Number of Visits to Meet Established Goals: 6    Presenting Problem: s/p syncope and fall at home; a-fib/flutter w/RVR; respiratory failure; septic shock w/suspected • Hypertension    • Lipid screening 2/23/2011   • Mixed hyperlipidemia    • Morbid obesity ([facility] [address]    • Osteoarthritis    • Osteoarthritis, localized, knee 10/26/2011    left   • Renal insufficiency, mild July 2010    Was hospitalized due to dehydration a side of the bed?: A Lot   How much help from another person does the patient currently need. ..   -   Moving to and from a bed to a chair (including a wheelchair)?: Total   -   Need to walk in hospital room?: Total   -   Climbing 3-5 steps with a railing?: staff    ASSESSMENT   Pt motivated and participatory this session. Pt attempting movements and participate in ex. Will increase frequency to 3-5 wk due to change in pt's motivation. Pt with improved pain control this session as well.  Pt continues to presen

## 2020-09-03 NOTE — CM/SW NOTE
Ana called Manuel Leonard at [address] [place] [address] to request that she begin insurance auth. Manuel Leonard states she spoke with wife today and wife was not happy about the no visitation policy so wants us to confirm wife still wants [facility]. Message left for wife to call ana.

## 2020-09-03 NOTE — PROGRESS NOTES
Cardiology Consultation  1 [facility] Patient Status:  Inpatient    1960 MRN [id number]   [facility] 4SW-A Attending Claudetta Sox, MD   [facility] Day # 4 PCP Sheryle La, MD         Assessment/Plan:  63 year ol HbA1c    8.1%   • Adhesive capsulitis     LUE   • Arrhythmia    • Chronic sinusitis    • Diabetes ([facility] [address]    • Diabetic peripheral neuropathy associated with type 2 diabetes mellitus [facility] 4/6/2012    by exam and symptoms   • Disc herniation Dx in 1/2012 states felt \"tingling & weird\"    Medications:  No current facility-administered medications on file prior to encounter. apixaban 5 MG Oral Tab, Take 1 tablet (5 mg total) by mouth 2 (two) times daily. , Disp: 180 tablet, Rfl: 3  Metoprolol Succinate ER or pain with swallowing  Cardiac: + dyspnea on exertion  Pulm: denies dyspnea, cough, wheeze  GI: denies n/v, abd pain, diarrhea or constipation  : denies hematuria, dysuria, incontinence  MSK: denies muscle or joint pains  Neuro: denies numbness, weakne Lab Results   Component Value Date    BUN 39 (H) 09/03/2020    BUN 40 (H) 09/02/2020    BUN 45 (H) 09/01/2020     Lab Results   Component Value Date    CREATSERUM 1.09 09/03/2020    CREATSERUM 1.30 09/02/2020    CREATSERUM 1.71 (H) 09/01/2020     Kira

## 2020-09-04 ENCOUNTER — APPOINTMENT (OUTPATIENT)
Dept: CV DIAGNOSTICS | Facility: HOSPITAL | Age: 60
DRG: 871 | End: 2020-09-04
Attending: PHYSICIAN ASSISTANT
Payer: COMMERCIAL

## 2020-09-04 ENCOUNTER — APPOINTMENT (OUTPATIENT)
Dept: INTERVENTIONAL RADIOLOGY/VASCULAR | Facility: HOSPITAL | Age: 60
DRG: 871 | End: 2020-09-04
Attending: PHYSICIAN ASSISTANT
Payer: COMMERCIAL

## 2020-09-04 ENCOUNTER — APPOINTMENT (OUTPATIENT)
Dept: WOUND CARE | Facility: HOSPITAL | Age: 60
End: 2020-09-04
Attending: INTERNAL MEDICINE
Payer: COMMERCIAL

## 2020-09-04 ENCOUNTER — ANESTHESIA (OUTPATIENT)
Dept: INTERVENTIONAL RADIOLOGY/VASCULAR | Facility: HOSPITAL | Age: 60
DRG: 871 | End: 2020-09-04
Payer: COMMERCIAL

## 2020-09-04 LAB
ALBUMIN SERPL-MCNC: 2.4 G/DL (ref 3.4–5)
ANION GAP SERPL CALC-SCNC: 2 MMOL/L (ref 0–18)
ATRIAL RATE: 90 BPM
BUN BLD-MCNC: 31 MG/DL (ref 7–18)
BUN/CREAT SERPL: 28.2 (ref 10–20)
CALCIUM BLD-MCNC: 9.1 MG/DL (ref 8.5–10.1)
CHLORIDE SERPL-SCNC: 113 MMOL/L (ref 98–112)
CO2 SERPL-SCNC: 20 MMOL/L (ref 21–32)
CREAT BLD-MCNC: 1.1 MG/DL (ref 0.7–1.3)
GLUCOSE BLD-MCNC: 180 MG/DL (ref 70–99)
GLUCOSE BLD-MCNC: 181 MG/DL (ref 70–99)
GLUCOSE BLD-MCNC: 183 MG/DL (ref 70–99)
GLUCOSE BLD-MCNC: 185 MG/DL (ref 70–99)
GLUCOSE BLD-MCNC: 206 MG/DL (ref 70–99)
GLUCOSE BLD-MCNC: 229 MG/DL (ref 70–99)
HAV IGM SER QL: 2 MG/DL (ref 1.6–2.6)
OSMOLALITY SERPL CALC.SUM OF ELEC: 291 MOSM/KG (ref 275–295)
P AXIS: 49 DEGREES
P-R INTERVAL: 160 MS
PHOSPHATE SERPL-MCNC: 1.9 MG/DL (ref 2.5–4.9)
POTASSIUM SERPL-SCNC: 5 MMOL/L (ref 3.5–5.1)
Q-T INTERVAL: 368 MS
QRS DURATION: 126 MS
QTC CALCULATION (BEZET): 450 MS
R AXIS: 59 DEGREES
SODIUM SERPL-SCNC: 135 MMOL/L (ref 136–145)
T AXIS: 42 DEGREES
VENTRICULAR RATE: 90 BPM

## 2020-09-04 PROCEDURE — 83735 ASSAY OF MAGNESIUM: CPT | Performed by: INTERNAL MEDICINE

## 2020-09-04 PROCEDURE — 93010 ELECTROCARDIOGRAM REPORT: CPT | Performed by: INTERNAL MEDICINE

## 2020-09-04 PROCEDURE — 93005 ELECTROCARDIOGRAM TRACING: CPT

## 2020-09-04 PROCEDURE — 82962 GLUCOSE BLOOD TEST: CPT

## 2020-09-04 PROCEDURE — 92960 CARDIOVERSION ELECTRIC EXT: CPT

## 2020-09-04 PROCEDURE — 5A2204Z RESTORATION OF CARDIAC RHYTHM, SINGLE: ICD-10-PCS | Performed by: INTERNAL MEDICINE

## 2020-09-04 PROCEDURE — 97530 THERAPEUTIC ACTIVITIES: CPT

## 2020-09-04 PROCEDURE — 93312 ECHO TRANSESOPHAGEAL: CPT

## 2020-09-04 PROCEDURE — 97535 SELF CARE MNGMENT TRAINING: CPT

## 2020-09-04 PROCEDURE — 93320 DOPPLER ECHO COMPLETE: CPT | Performed by: PHYSICIAN ASSISTANT

## 2020-09-04 PROCEDURE — B24BZZ4 ULTRASONOGRAPHY OF HEART WITH AORTA, TRANSESOPHAGEAL: ICD-10-PCS | Performed by: INTERNAL MEDICINE

## 2020-09-04 PROCEDURE — 97110 THERAPEUTIC EXERCISES: CPT

## 2020-09-04 PROCEDURE — 80069 RENAL FUNCTION PANEL: CPT | Performed by: INTERNAL MEDICINE

## 2020-09-04 PROCEDURE — 93325 DOPPLER ECHO COLOR FLOW MAPG: CPT | Performed by: PHYSICIAN ASSISTANT

## 2020-09-04 RX ORDER — INSULIN ASPART 100 [IU]/ML
INJECTION, SOLUTION INTRAVENOUS; SUBCUTANEOUS
Status: DISCONTINUED
Start: 2020-09-04 | End: 2020-09-04 | Stop reason: WASHOUT

## 2020-09-04 RX ORDER — HYDROMORPHONE HYDROCHLORIDE 1 MG/ML
0.4 INJECTION, SOLUTION INTRAMUSCULAR; INTRAVENOUS; SUBCUTANEOUS EVERY 5 MIN PRN
Status: ACTIVE | OUTPATIENT
Start: 2020-09-04 | End: 2020-09-04

## 2020-09-04 RX ORDER — SODIUM CHLORIDE, SODIUM LACTATE, POTASSIUM CHLORIDE, CALCIUM CHLORIDE 600; 310; 30; 20 MG/100ML; MG/100ML; MG/100ML; MG/100ML
INJECTION, SOLUTION INTRAVENOUS CONTINUOUS
Status: DISCONTINUED | OUTPATIENT
Start: 2020-09-04 | End: 2020-09-04

## 2020-09-04 RX ORDER — GLYCOPYRROLATE 0.2 MG/ML
INJECTION, SOLUTION INTRAMUSCULAR; INTRAVENOUS AS NEEDED
Status: DISCONTINUED | OUTPATIENT
Start: 2020-09-04 | End: 2020-09-04 | Stop reason: SURG

## 2020-09-04 RX ORDER — METOPROLOL SUCCINATE 25 MG/1
25 TABLET, EXTENDED RELEASE ORAL
Status: DISCONTINUED | OUTPATIENT
Start: 2020-09-04 | End: 2020-09-09

## 2020-09-04 RX ORDER — ONDANSETRON 2 MG/ML
4 INJECTION INTRAMUSCULAR; INTRAVENOUS AS NEEDED
Status: ACTIVE | OUTPATIENT
Start: 2020-09-04 | End: 2020-09-04

## 2020-09-04 RX ORDER — DEXTROSE MONOHYDRATE 25 G/50ML
50 INJECTION, SOLUTION INTRAVENOUS
Status: DISCONTINUED | OUTPATIENT
Start: 2020-09-04 | End: 2020-09-10

## 2020-09-04 RX ORDER — SODIUM CHLORIDE 9 MG/ML
INJECTION, SOLUTION INTRAVENOUS
Status: COMPLETED | OUTPATIENT
Start: 2020-09-04 | End: 2020-09-04

## 2020-09-04 RX ADMIN — GLYCOPYRROLATE 0.4 MG: 0.2 INJECTION, SOLUTION INTRAMUSCULAR; INTRAVENOUS at 13:40:00

## 2020-09-04 NOTE — PROCEDURES
[facility] [address] [place] [address] Cardiology  Transesophageal Echocardiogram Report    PREOPERATIVE DIAGNOSIS: Atrial fibrillation    POSTOPERATIVE DIAGNOSIS: Atrial fibrillation    PROCEDURE PERFORMED: Transesophageal echocardiogram  (CPT code 16093) with Doppler echo visualized portions of the tricuspid valve, pulmonic and mitral valve have normal morphology and motion. The aortic valve morphology appears trileaflet, with normal leaflet motion.  There is no evidence of valvular vegetations, intracardiac masses or thromb

## 2020-09-04 NOTE — OCCUPATIONAL THERAPY NOTE
OCCUPATIONAL THERAPY TREATMENT NOTE - INPATIENT     Room Number: 8918/4305-O  Session: 1   Number of Visits to Meet Established Goals: 5    Presenting Problem: fall    History related to current admission: Therapy significant co-morbidities:   Morbid obesit Past Surgical History  Past Surgical History:   Procedure Laterality Date   • OTHER SURGICAL HISTORY      Root canals       SUBJECTIVE  Patient reports blisters on his back are painful. Patient self-stated goal is to get stronger and go home.     O fatigue  UBD: min A  Instructed in BUE AROM exercises:   Shoulder flexion 10 reps   Shoulder abduction 10 reps   Shoulder shrugs 10 reps   Scapular retraction/protraction  10 reps   Supination/pronation 10 reps   Elbow flexion/extension 10 reps   Hand pump Goals   Patient will perform upper body dressing:  with min assist  Patient will perform lower body dressing:  with min assist  Patient will perform toileting: with min assist     Functional Transfer Goals  Patient will transfer in bed by rolling:  with mi

## 2020-09-04 NOTE — PLAN OF CARE
Pt alert and oriented x4. Using cpap overnight. Denies any SOB or chest pain. A fib on tele. Denies pain. Doran in place. Wound dressing to BLE c/d/I. Had brief episode of hallucination overnight.  I was with the pt in the room and he appeared to be spe electrolytes and administer replacement therapy as ordered  Outcome: Progressing     Problem: RESPIRATORY - ADULT  Goal: Achieves optimal ventilation and oxygenation  Description  INTERVENTIONS:  - Assess for changes in respiratory status  - Assess for nate remains intact  Description  INTERVENTIONS  - Assess and document risk factors for pressure ulcer development  - Assess and document skin integrity  - Monitor for areas of redness and/or skin breakdown  - Initiate interventions, skin care algorithm/standar wounds/incisions during mobilization  - Obtain PT/OT consults as needed  - Advance activity as appropriate  - Communicate ordered activity level and limitations with patient/family  Outcome: Progressing  Goal: Maintain proper alignment of affected body par falls.  - [place] fall precautions as indicated by assessment.  - Educate pt/family on patient safety including physical limitations  - Instruct pt to call for assistance with activity based on assessment  - Modify environment to reduce risk of injury  -

## 2020-09-04 NOTE — PROCEDURES
[facility] Cardiology  Cardioversion Report    PREOPERATIVE DIAGNOSIS:  Atrial fibrillation. POSTOPERATIVE DIAGNOSIS:  Normal sinus rhythm. PROCEDURE PERFORMED:  Direct current cardioversion.      DESCRIPTION OF PROCEDURE:  The risks, benefi

## 2020-09-04 NOTE — PROGRESS NOTES
JOSE/CV completed per Dr Leoncio Walkre with anesthesia 360 joules x 1 and pt converted to NSR. EKG obtained.  Pt to PACU for recovery

## 2020-09-04 NOTE — ANESTHESIA POSTPROCEDURE EVALUATION
[address] Patient Status:  Inpatient   Age/Gender 63year old male MRN [id number]   [facility] 2NE-A Attending Eduardo Hansen, [facility] Day # 5 PCP Akiko Zaman MD       Anesthesia Post-op Note    Procedure(s):

## 2020-09-04 NOTE — RESPIRATORY THERAPY NOTE
[facility] Equipment Usage Summary :            Set Mode : CPAP W FLEX          Usage in Hours:14;28          90% Pressure (EPAP) : 18           90% Insp Pressure (IPAP);           AHI : 0.6

## 2020-09-04 NOTE — CM/SW NOTE
Sw received order \" Wife wants update of [facility] and when pt will be going. Wife states that she has been getting phone calls from a [facility] and feels no one is communicating with her about discharge planning to [facility]\"    Sw met with pt and wife in room.   Pt sched

## 2020-09-04 NOTE — PROGRESS NOTES
[facility]    Nephrology Progress Note    Recardo Face Attending: Kodi Hamilton MD     Cc: Leopold Cheney    SUBJECTIVE     On BIPAP  Family at bedside.     PHYSICAL EXAM   Vital signs: /51 (BP Location: Right arm)   Pulse 81   Temp 97.8 °F (36.6 °C (ZOFRAN-ODT) disintegrating tab 4 mg, 4 mg, Oral, Q6H PRN    Or  ondansetron HCl (ZOFRAN) injection 4 mg, 4 mg, Intravenous, Q6H PRN  glucose (DEX4) oral liquid 15 g, 15 g, Oral, Q15 Min PRN    Or  Glucose-Vitamin C (DEX-4) chewable tab 4 tablet, 4 tablet, kidney visible. This appears secondary to bowel gas most likely. BLADDER:  The urinary bladder is not visible.     OTHER:  Negative.                               =====    CONCLUSION:             Limited exam.  The bladder is not visible, apparentl LATERAL),RIGHT XRAY, 10/26/2011,     12:45 PM.         INDICATIONS:  bilateral knee pain/numbness         PATIENT STATED HISTORY: (As transcribed by Technologist)  Bilateral knee     pain and numbness.                FINDINGS:      BONES:  Osseous structure clinically. ELAINE REILLY notified of these findings with preliminary radiology report from     [facility] services.                          Dictated by (CST): Nahun Daniels MD on 8/30/2020 at 7:38 AM         Finalized by (CST): Nahun Daniels MD on 8/30/2020 at and bladder was performed. Routine technique was utilized. PATIENT STATED HISTORY: (As transcribed by Technologist)           FINDINGS:      RIGHT KIDNEY  MEASUREMENTS:  10.5 x 6.0 x 5.8 cm  ECHOGENICITY:  Normal.  HYDRONEPHROSIS:  None.   CYSTS/STON primary       Aflutter  - per cardiology. On dilt ggt  - Cardioversion per cardiology. D/w family at bedside. Thank you for allowing me to participate in the care of this patient. Please do not hesitate to call with questions or concerns.        Kar Bejarano

## 2020-09-04 NOTE — PHYSICAL THERAPY NOTE
PHYSICAL THERAPY TREATMENT NOTE - INPATIENT    Room Number: 9890/0080-C     Session: 1/6   Number of Visits to Meet Established Goals: 6    Presenting Problem: s/p syncope and fall at home; a-fib/flutter w/RVR; respiratory failure; septic shock w/suspecte cholesterol    • Hypertension    • Lipid screening 2/23/2011   • Mixed hyperlipidemia    • Morbid obesity ([facility] [address]    • Osteoarthritis    • Osteoarthritis, localized, knee 10/26/2011    left   • Renal insufficiency, mild July 2010    Was hospitalized due to d bed?: Unable   How much help from another person does the patient currently need. ..   -   Moving to and from a bed to a chair (including a wheelchair)?: Total   -   Need to walk in hospital room?: Total   -   Climbing 3-5 steps with a railing?: Total the platform and attempt without, however this was not a safe option to attempt. Pt returned to supine c total assist x3.  It was noted that pt had a BM during this time and required 2 person assist to roll onto each side, reposition his trunk/legs, total a Goal #4 Added 9/3/: Pt will sit EOB with supervision assist.   Goal #5 Added 9/3: pt will completes sit to/from stand with max assist onto platform from EOB.    Goal #6     Goal Comments: Goals established on 8/31/2020, goals added and ongoing 9/4/20

## 2020-09-04 NOTE — ANESTHESIA POSTPROCEDURE EVALUATION
[address] Patient Status:  Inpatient   Age/Gender 63year old male MRN [id number]   [facility] 2NE-A Attending Christian Shaw, [facility] Day # 5 PCP Noe Vo MD       Anesthesia Post-op Note    Procedure(s):

## 2020-09-04 NOTE — PROGRESS NOTES
[facility] Cardiology  Progress Note    Milan [place] Patient Status:  Inpatient    1960 MRN [id number]   [facility] 2NE-A Attending Shirley Walker, [facility] Day # 5 PCP Gian Orellana MD     Impression:  1) Fall  2) AK tablet, Oral, Q15 Min PRN  lactated ringers infusion, , Intravenous, Continuous  fentaNYL citrate (SUBLIMAZE) 0.05 MG/ML injection 25 mcg, 25 mcg, Intravenous, Q5 Min PRN  HYDROmorphone HCl (DILAUDID) 1 MG/ML injection 0.4 mg, 0.4 mg, Intravenous, Q5 Min P 1-2 tablet, 1-2 tablet, Oral, Q6H PRN  DULoxetine HCl (CYMBALTA) DR particles cap 60 mg, 60 mg, Oral, Daily  atorvastatin (LIPITOR) tab 20 mg, 20 mg, Oral, Nightly  apixaban (ELIQUIS) tab 5 mg, 5 mg, Oral, BID  cyclobenzaprine (FLEXERIL) tab 10 mg, 10 mg,

## 2020-09-04 NOTE — PAYOR COMM NOTE
--------------  CONTINUED STAY REVIEW    Payor: [employer]  Subscriber #:  [id number]  Authorization Number: [id number]    Admit date: 8/30/20  Admit time: [facility]     Admitting Physician: Hellen Chu MD  Attending Physician:  Shaggy Doran, * 5.2*   < > 5.1  5.1 4.9 4.8 5.4* 4.9 5.0    109  --  108  --  109 109  --  113*   CO2 26.0 23.0  --  22.0  --  20.0* 26.0  --  20.0*   BUN 45* 44*  --  45*  --  40* 39*  --  31*   CREATSERUM 2.45* 2.05*  --  1.71*  --  1.30 1.09  --  1.10   CA 8.8 8. to 25mg given low BP  -dig, dilt gtt    -cont eliquis  -echo EF 20-25%  -plan for JOSE cardioversion today; limited options c comorbiites and low BP      # Hypotension 2/2 septic shock (shock resolved), in setting of strep bacteremia  -ACE on hold  -blood c planning     -Pt lives c wife     D/w RN            Questions/concerns were discussed with patient by bedside.       Thank Rudy Hilario MD     [facility] Hospitalist  Answering Service number: [phone]               Electronically signed by Philly Kelly RN      Insulin Aspart Pen (NOVOLOG) 100 UNIT/ML flexpen 2-10 Units     Date Action Dose Route User    9/4/2020 1032 Given 2 Units Subcutaneous (Left Lower Abdomen) Lasha Neff RN    9/3/2020 2240 Given 3 Units Subcutaneous (Left Upper Arm) Hailey Grayson

## 2020-09-04 NOTE — PROGRESS NOTES
[facility] Hospitalist Progress Note     PCP: Maykel Acevedo MD    Chief Complaint: follow-up    Overnight/Interim Events:      SUBJECTIVE:  Pt laying in bed,  Asleep on cpap.  Wife at bedside  Agitated and hallucinated overnight  Awaiting JOSE cardioversion    OB --   --  3.2  --  3.0 2.3*  --  1.9*   GLU 90 66*  --  88  --  135* 165*  --  181*    < > = values in this interval not displayed.        Recent Labs   Lab 08/30/20  0154 08/31/20  0418 09/01/20  0420 09/02/20  0709 09/03/20  0611 09/04/20  0537   ALT 15* 1 bacteria, Ucx NG  -PCT 1.88, lactate neg;   -rapid covid neg  -IV abx, s/p pressors    # Acute respiratory failure (much improved)- resolved, on RA when awake  -was on 4L NC  -some fluid overload, less likely PNA, no marked cough/fevers  -got lasix x1, BNP

## 2020-09-04 NOTE — PROGRESS NOTES
INFECTIOUS DISEASE PROGRESS NOTE    Leann Herrera Patient Status:  Inpatient    1960 MRN [id number]   [facility] 4SW-A Attending Anh Gregory MD   [facility] Day # 5 ELODIA Godinez Intravenous, Q2H PRN  •  insulin detemir (LEVEMIR) 100 UNIT/ML flextouch 10 Units, 10 Units, Subcutaneous, Daily  •  acetaminophen (TYLENOL) tab 650 mg, 650 mg, Oral, Q6H PRN  •  ondansetron (ZOFRAN-ODT) disintegrating tab 4 mg, 4 mg, Oral, Q6H PRN **OR** 32.4* 32.5* 30.0* 34.0* 33.6*   MCV 90.5 92.9 91.2 94.2 88.9   MCH 27.7 27.7 27.7 27.7 27.8   MCHC 30.6* 29.8* 30.3* 29.4* 31.3   RDW 16.4* 17.0* 16.8* 16.1* 16.1*   NEPRELIM 15.33* 12.71*  --   --   --    WBC 16.8* 15.3* 10.3 8.2 9.5   .0 231.0 203 infected. 7. SOPHIA. Resolving. Urine culture negative. Renal US limited, no hydronephrosis noted on right, simple cyst on right; unable to visualize left kidney. 8. Chronic LE lymphedema  9. Morbid obesity, BMI 70.75  10.  Atrial flutter, s/p cardioversion

## 2020-09-04 NOTE — CM/SW NOTE
[facility] [address] has insurance auth for admission. Await medical clearance for dc. Pt will need bariatric equipment at [facility] so they will need several hours notice to get bariatric equipment to the facility. Wife updated.     Jude Inman, MALAIKA Wheatley

## 2020-09-05 LAB
ALBUMIN SERPL-MCNC: 2.5 G/DL (ref 3.4–5)
ANION GAP SERPL CALC-SCNC: 2 MMOL/L (ref 0–18)
BUN BLD-MCNC: 32 MG/DL (ref 7–18)
BUN/CREAT SERPL: 29.9 (ref 10–20)
CALCIUM BLD-MCNC: 8.7 MG/DL (ref 8.5–10.1)
CHLORIDE SERPL-SCNC: 111 MMOL/L (ref 98–112)
CO2 SERPL-SCNC: 27 MMOL/L (ref 21–32)
CREAT BLD-MCNC: 1.07 MG/DL (ref 0.7–1.3)
GLUCOSE BLD-MCNC: 181 MG/DL (ref 70–99)
GLUCOSE BLD-MCNC: 194 MG/DL (ref 70–99)
GLUCOSE BLD-MCNC: 194 MG/DL (ref 70–99)
GLUCOSE BLD-MCNC: 208 MG/DL (ref 70–99)
GLUCOSE BLD-MCNC: 229 MG/DL (ref 70–99)
GLUCOSE BLD-MCNC: 237 MG/DL (ref 70–99)
HAV IGM SER QL: 2 MG/DL (ref 1.6–2.6)
OSMOLALITY SERPL CALC.SUM OF ELEC: 302 MOSM/KG (ref 275–295)
PHOSPHATE SERPL-MCNC: 2.1 MG/DL (ref 2.5–4.9)
POTASSIUM SERPL-SCNC: 5.1 MMOL/L (ref 3.5–5.1)
SODIUM SERPL-SCNC: 140 MMOL/L (ref 136–145)

## 2020-09-05 PROCEDURE — 83735 ASSAY OF MAGNESIUM: CPT | Performed by: INTERNAL MEDICINE

## 2020-09-05 PROCEDURE — 80069 RENAL FUNCTION PANEL: CPT | Performed by: INTERNAL MEDICINE

## 2020-09-05 PROCEDURE — 82962 GLUCOSE BLOOD TEST: CPT

## 2020-09-05 RX ORDER — FUROSEMIDE 10 MG/ML
40 INJECTION INTRAMUSCULAR; INTRAVENOUS DAILY
Status: DISCONTINUED | OUTPATIENT
Start: 2020-09-05 | End: 2020-09-07

## 2020-09-05 RX ORDER — HYDROCODONE BITARTRATE AND ACETAMINOPHEN 10; 325 MG/1; MG/1
1-2 TABLET ORAL EVERY 6 HOURS PRN
Status: DISCONTINUED | OUTPATIENT
Start: 2020-09-05 | End: 2020-09-10

## 2020-09-05 NOTE — RESPIRATORY THERAPY NOTE
[facility] - Equipment Use Daily Summary:  · Set Mode CPAP  · Usage in hours: 7.0  · 90% Pressure (EPAP) level: 18.0  · 90% Insp Pressure (IPAP):   · AHI: 2.8  · Supplemental Oxygen:   · Comments:

## 2020-09-05 NOTE — PROGRESS NOTES
Cardiology Consultation  1 [facility] Patient Status:  Inpatient    1960 MRN [id number]   [facility] 4SW-A Attending Elana Young MD   [facility] Day # 6 PCP Jessy Delatorre MD         Assessment/Plan:  63 year ol symptoms   • Disc herniation Dx in 1/2012    L5-S1 disc herniation after slipping on ice.  This was medically managed   • DJD (degenerative joint disease) of knee 2/2/2012    bilat-received Synvisc-One injections to both knees   • High cholesterol    • Hype 180 tablet, Rfl: 3  Metoprolol Succinate ER 50 MG Oral Tablet 24 Hr, Take 1 tablet (50 mg total) by mouth daily. , Disp: 90 tablet, Rfl: 3  lisinopril 2.5 MG Oral Tab, Take 1 tablet (2.5 mg total) by mouth once daily. , Disp: 90 tablet, Rfl: 3  SITagliptin-m joint pains  Neuro: denies numbness, weakness, paresthesias  Psych: denies anxiety, depression  Integument: denies skin rashes or lesions  Heme: denies easy bruising or bleeding  Endo: denies heat/cold intolerance, skin or nail changes      Physical Exam: JOVANERUM 1.30 09/02/2020     Cholesterol:     Lab Results   Component Value Date    CHOLEST 185.00 06/18/2019    CHOLEST 209 (H) 11/30/2018    CHOLEST 149 05/26/2018     Lab Results   Component Value Date    HDL 48.5 (L) 06/18/2019    HDL 52 11/30/201

## 2020-09-05 NOTE — PLAN OF CARE
Pt very drowsy since last dose of dilaudid. On CPAP while sleeping. Easily arousable when name called but falls right back asleep. Dr. Griselda Loft at bedside this afternoon and dilaudid d/c'd. Will try norco for pain instead once pt is more awake.  Vitals donaldo

## 2020-09-05 NOTE — PROGRESS NOTES
[facility]    Nephrology Progress Note    Danielle Olivo Attending: Bladimir Butler MD     Cc: josse    SUBJECTIVE     On RA not in acute distress.     PHYSICAL EXAM   Vital signs: /47 (BP Location: Right arm)   Pulse 93   Temp 98 °F (36.7 °C) Intravenous, Q2H PRN    Or  HYDROmorphone HCl (DILAUDID) 1 MG/ML injection 0.8 mg, 0.8 mg, Intravenous, Q2H PRN  insulin detemir (LEVEMIR) 100 UNIT/ML flextouch 10 Units, 10 Units, Subcutaneous, Daily  acetaminophen (TYLENOL) tab 650 mg, 650 mg, Oral, Q6H The urinary bladder is not visible. OTHER:  Negative.                               =====    CONCLUSION:             Limited exam.  The bladder is not visible, apparently because of bowel     gas and/or being under distended. .  The right kidney is seen, pain/numbness         PATIENT STATED HISTORY: (As transcribed by Technologist)  Bilateral knee     pain and numbness. FINDINGS:      BONES:  Osseous structures are intact.   Tricompartmental joint space     narrowing with marginal osteophytes from     [facility].                          Dictated by (CST): Ani Barnett MD on 8/30/2020 at 7:38 AM         Finalized by (CST): Ani Barnett MD on 8/30/2020 at 7:40 AM        [address] [address] ([id number])   Final Result    PROCEDURE:  CT BRAIN OR HEAD ( (As transcribed by Technologist)           FINDINGS:      RIGHT KIDNEY  MEASUREMENTS:  10.5 x 6.0 x 5.8 cm  ECHOGENICITY:  Normal.  HYDRONEPHROSIS:  None.   CYSTS/STONES/MASSES:  Cyst upper pole right kidney superior medial 4.0 x 4.4 x 4.7 cm having simple Group  Nephrology

## 2020-09-05 NOTE — PROGRESS NOTES
POLA Hospitalist Progress Note     [facility]      SUBJECTIVE:  NO acute events overnight  Sleepy this afternoon    OBJECTIVE:  Temp:  [98 °F (36.7 °C)-98.5 °F (36.9 °C)] 98.1 °F (36.7 °C)  Pulse:  [88-98] 97  Resp:  [18-20] 18  BP: (119-149)/(47-68) Labs   Lab 09/04/20  1740 09/04/20  2301 09/05/20  0742 09/05/20  1205 09/05/20  1539   PGLU 183* 229* 181* 237* 229*       Imaging:  Xr Chest Ap/pa (1 View) (cpt=71045)    Result Date: 8/30/2020  PROCEDURE:  XR CHEST AP/PA (1 VIEW) (CPT=71045)  TECHNIQUE: compared to most recent left knee radiographs dated 10/26/2011. ELAINE REILLY notified of these findings with preliminary radiology report from [facility] services.     Dictated by (CST): Tara Fox MD on 8/30/2020 at 7:44 AM     Finalized by (CST): Tara Fox MD ventricles and sulci. INTRACRANIAL:  There are no abnormal extraaxial fluid collections. There is no midline shift. There are no acute appearing intraparenchymal brain abnormalities. There is nothing specific for acute territorial infarct.   There is no Intravenous, Daily  HYDROcodone-acetaminophen (NORCO)  MG per tab 1-2 tablet, 1-2 tablet, Oral, Q6H PRN  [START ON 9/6/2020] insulin detemir (LEVEMIR) 100 UNIT/ML flextouch 15 Units, 15 Units, Subcutaneous, Daily  glucose (DEX4) oral liquid 15 g, 15 orthostatic?  -follow on tele  -PT/OT     # aflutter, hx HTN  - metoprolol to 25mg given low BP  -dig, dilt gtt    -cont eliquis  -echo EF 20-25%  -s/p JOSE cardioversion 9/4; limited options c comorbiites and low BP      # Hypotension 2/2 septic shock (monique dilaudid  - PO norco instead if needed, none now with drowsiness  -d/w wife who expressed understanding  - CPAP when sleeping      # Proph  -eliquis    Prophy:  DVT: heparin suBQ  Deconditioning prevention: PT/OT    Dispo: inpatient, not ready for d/c at t

## 2020-09-05 NOTE — PROGRESS NOTES
INFECTIOUS DISEASE PROGRESS NOTE    Jack Workman Patient Status:  Inpatient    1960 MRN [id number]   [facility] 4SW-A Attending Susi Galarza MD   [facility] Day # 6 PCP Greta Crain PRN **OR** ondansetron HCl (ZOFRAN) injection 4 mg, 4 mg, Intravenous, Q6H PRN  •  Insulin Aspart Pen (NOVOLOG) 100 UNIT/ML flexpen 2-10 Units, 2-10 Units, Subcutaneous, TID CC and HS  •  HYDROcodone-acetaminophen (NORCO) 5-325 MG per tab 1-2 tablet, 1-2 t 09/05/20  0725   *   < > 90   < > 165*  --  181* 194*   BUN 37*   < > 45*   < > 39*  --  31* 32*   CREATSERUM 1.94*   < > 2.45*   < > 1.09  --  1.10 1.07   GFRAA 42*   < > 32*   < > 84  --  83 86   GFRNAA 36*   < > 27*   < > 73  --  72 75   CA 8.6 temps and wbc  - monitor leg clinically  - local care to wounds per wound care team  - noted plans for ABENA on d/c, with plans to transition to po abx before discharge. Legs slowly improving but would like them to be more improved prior to d/c.     Case and

## 2020-09-05 NOTE — PLAN OF CARE
Pt alert and oriented x4. No episodes of hallucination overnight. On Cpap. Denies any SOB or chest pain. NSR on tele. C/o bilateral leg pain, IV dilaudid given with relief. Wound care dressings to BLE c/d/I. QID accucheck.      Problem: Diabetes/Glucose Con oxygenation and minimize respiratory effort  - Oxygen supplementation based on oxygen saturation or ABGs  - Provide Smoking Cessation handout, if applicable  - Encourage broncho-pulmonary hygiene including cough, deep breathe, Incentive Spirometry  - Asses Incision(s), wounds(s) or drain site(s) healing without S/S of infection  Description  INTERVENTIONS:  - Assess and document risk factors for pressure ulcer development  - Assess and document skin integrity  - Assess and document dressing/incision, wound b body alignment per provider's orders  - Instruct and reinforce with patient and family use of appropriate assistive device and precautions (e.g. spinal or hip dislocation precautions)  Outcome: Progressing     Problem: Impaired Activities of Daily Living to assist with strengthening/mobility  - Encourage toileting schedule  Outcome: Progressing

## 2020-09-05 NOTE — PLAN OF CARE
Assumed care this am. Pt is A/O. Uses CPAP when sleeping. Exp wheezing. C/o severe leg cramping. Chronic BLE pain, managed with dilaudid. Dsngs changed to BLE per wound care orders. Blister to LLE weeping. Legs weeping. Cleansed.  Redness to LLE improved a stability  - Monitor arterial and/or venous puncture sites for bleeding and/or hematoma  - Assess quality of pulses, skin color and temperature  - Assess for signs of decreased coronary artery perfusion - ex.  Angina  - Evaluate fluid balance, assess for ed stability and optimal renal function maintained  Description  INTERVENTIONS:  - Monitor labs and assess for signs and symptoms of volume excess or deficit  - Monitor intake, output and patient weight  - Monitor urine specific gravity, serum osmolarity and pain and request assistance  - Assess pain using appropriate pain scale  - Administer analgesics based on type and severity of pain and evaluate response  - Implement non-pharmacological measures as appropriate and evaluate response  - Consider cultural an equipment as needed  - Ensure adequate protection for wounds/incisions during mobilization  - Obtain PT/OT consults as needed  - Advance activity as appropriate  - Communicate ordered activity level and limitations with patient/family  Outcome: Progressing

## 2020-09-06 LAB
ALBUMIN SERPL-MCNC: 2.4 G/DL (ref 3.4–5)
ANION GAP SERPL CALC-SCNC: <0 MMOL/L (ref 0–18)
BUN BLD-MCNC: 30 MG/DL (ref 7–18)
BUN/CREAT SERPL: 27.3 (ref 10–20)
CALCIUM BLD-MCNC: 8.7 MG/DL (ref 8.5–10.1)
CHLORIDE SERPL-SCNC: 110 MMOL/L (ref 98–112)
CO2 SERPL-SCNC: 28 MMOL/L (ref 21–32)
CREAT BLD-MCNC: 1.1 MG/DL (ref 0.7–1.3)
DIGOXIN SERPL-MCNC: 1.17 NG/ML (ref 0.8–2)
GLUCOSE BLD-MCNC: 194 MG/DL (ref 70–99)
GLUCOSE BLD-MCNC: 198 MG/DL (ref 70–99)
GLUCOSE BLD-MCNC: 215 MG/DL (ref 70–99)
GLUCOSE BLD-MCNC: 215 MG/DL (ref 70–99)
GLUCOSE BLD-MCNC: 233 MG/DL (ref 70–99)
HAV IGM SER QL: 1.9 MG/DL (ref 1.6–2.6)
OSMOLALITY SERPL CALC.SUM OF ELEC: 297 MOSM/KG (ref 275–295)
PHOSPHATE SERPL-MCNC: 2.3 MG/DL (ref 2.5–4.9)
POTASSIUM SERPL-SCNC: 4.8 MMOL/L (ref 3.5–5.1)
SODIUM SERPL-SCNC: 137 MMOL/L (ref 136–145)

## 2020-09-06 PROCEDURE — 83735 ASSAY OF MAGNESIUM: CPT | Performed by: INTERNAL MEDICINE

## 2020-09-06 PROCEDURE — 80162 ASSAY OF DIGOXIN TOTAL: CPT | Performed by: INTERNAL MEDICINE

## 2020-09-06 PROCEDURE — 80069 RENAL FUNCTION PANEL: CPT | Performed by: INTERNAL MEDICINE

## 2020-09-06 PROCEDURE — 82962 GLUCOSE BLOOD TEST: CPT

## 2020-09-06 RX ORDER — FUROSEMIDE 10 MG/ML
40 INJECTION INTRAMUSCULAR; INTRAVENOUS ONCE
Status: COMPLETED | OUTPATIENT
Start: 2020-09-06 | End: 2020-09-06

## 2020-09-06 NOTE — PLAN OF CARE
Pt a/o x4. On CPAP while sleeping. QID accuchecks, IV lasix and IV abx given. Pt refused ensure shake this am. Weeping wounds on both lower legs with blister on LLL. Dressings changed today. Small blister on upper back. Mepilex applied.  Silver foam applied temperature  - Assess for signs of decreased coronary artery perfusion - ex.  Angina  - Evaluate fluid balance, assess for edema, trend weights  Outcome: Progressing  Goal: Absence of cardiac arrhythmias or at baseline  Description  INTERVENTIONS:  - Contin volume excess or deficit  - Monitor intake, output and patient weight  - Monitor urine specific gravity, serum osmolarity and serum sodium as indicated or ordered  - Monitor response to interventions for patient's volume status, including labs, urine outpu pain and evaluate response  - Implement non-pharmacological measures as appropriate and evaluate response  - Consider cultural and social influences on pain and pain management  - Manage/alleviate anxiety  - Utilize distraction and/or relaxation techniques Advance activity as appropriate  - Communicate ordered activity level and limitations with patient/family  Outcome: Progressing  Goal: Maintain proper alignment of affected body part  Description  INTERVENTIONS:  - Support and protect limb and body alignme

## 2020-09-06 NOTE — PROGRESS NOTES
Cardiology Consultation  1 [facility] Patient Status:  Inpatient    1960 MRN [id number]   [facility] 4SW-A Attending Krystal Fink MD   [facility] Day # 7 PCP Andrea Shipley MD         Assessment/Plan:  63 year ol associated with type 2 diabetes mellitus ([facility] [address] 4/6/2012    by exam and symptoms   • Disc herniation Dx in 1/2012    L5-S1 disc herniation after slipping on ice.  This was medically managed   • DJD (degenerative joint disease) of knee 2/2/2012    tracy Oral Tab, Take 1 tablet (5 mg total) by mouth 2 (two) times daily. , Disp: 180 tablet, Rfl: 3  Metoprolol Succinate ER 50 MG Oral Tablet 24 Hr, Take 1 tablet (50 mg total) by mouth daily. , Disp: 90 tablet, Rfl: 3  lisinopril 2.5 MG Oral Tab, Take 1 tablet ( constipation  : denies hematuria, dysuria, incontinence  MSK: denies muscle or joint pains  Neuro: denies numbness, weakness, paresthesias  Psych: denies anxiety, depression  Integument: denies skin rashes or lesions  Heme: denies easy bruising or bleedi Component Value Date    CREATSERUM 1.10 09/06/2020    CREATSERUM 1.07 09/05/2020    CREATSERUM 1.10 09/04/2020     Cholesterol:     Lab Results   Component Value Date    CHOLEST 185.00 06/18/2019    CHOLEST 209 (H) 11/30/2018    CHOLEST 149 05/26/2018

## 2020-09-06 NOTE — PLAN OF CARE
1630- Pt tolerated chair for a little over 1 hr.  Stated he felt like he may be starting to slide down, safely lifted via viking lift with 4 staff back to bed. Pt tolerated well. Hair washed. Family remains at bs.  \"Feeling better\"    Physical therapy ha

## 2020-09-06 NOTE — PROGRESS NOTES
[facility]    Nephrology Progress Note    Ramy Bhandari Attending:   Kristen Rodriguez MD     Cc: Vasile Adams    SUBJECTIVE     On BIPAP resting comfortably    PHYSICAL EXAM   Vital signs: /67 (BP Location: Right arm)   Pulse 87   Temp 98.2 °F (36.8 °C 300 mg, 300 mg, Oral, BID  acetaminophen (TYLENOL) tab 650 mg, 650 mg, Oral, Q6H PRN  ondansetron (ZOFRAN-ODT) disintegrating tab 4 mg, 4 mg, Oral, Q6H PRN    Or  ondansetron HCl (ZOFRAN) injection 4 mg, 4 mg, Intravenous, Q6H PRN  Insulin Aspart Pen (LUCIO 1:55 PM         Finalized by (CST): Simeon Sloan MD on 8/31/2020 at 1:56 PM        XR KNEE (1 OR 2 VIEWS), LEFT (CPT=73560)   Final Result    PROCEDURE:  XR KNEE (1 OR 2 VIEWS), LEFT (CPT=73560)         COMPARISON:  [facility] , XR, KNEE (AP   LATERAL), LEF osteoarthritis when compared to most     recent right knee radiographs dated 10/26/2011. ELAINE REILLY notified of these findings with preliminary radiology report from     [facility] services.                     Dictated by (CST): Yudy Collazo MD on 8/30/2 Radiology) NRDR ([address])     which includes the Dose Index     Registry.          PATIENT STATED HISTORY: (As transcribed by Technologist)  Patient presents     after falling out of bed               FINDINGS:  Preliminary reading because of bowel gas and/or being under distended. .  The right kidney is seen, showing a simple appearing cyst upper pole, measuring up to 4.7 cm. No acute right kidney process. The left kidney   cannot be visualized, apparently because of bowel gas.

## 2020-09-06 NOTE — PLAN OF CARE
Problem: BLE cellulitis, fluid overload  Data: Patient alert and oriented overnight, drowsy at times. Patient on room air while awake and wearing cpap with sleep to maintain O2 saturation >90%. Patient needing O2 2L through cpap tonight.  Patient reports pa CO2 retention  Outcome: Progressing     Problem: SKIN/TISSUE INTEGRITY - ADULT  Goal: Incision(s), wounds(s) or drain site(s) healing without S/S of infection  Description  INTERVENTIONS:  - Assess and document risk factors for pressure ulcer development

## 2020-09-06 NOTE — PROGRESS NOTES
POLA Hospitalist Progress Note     [facility]      SUBJECTIVE:  No acute events overnight  Feeling ok, having leg pain  More awake and appropriate today    OBJECTIVE:  Temp:  [97.6 °F (36.4 °C)-98.2 °F (36.8 °C)] 98.2 °F (36.8 °C)  Pulse:  [87-98] 91 Imaging:  Xr Chest Ap/pa (1 View) (cpt=71045)    Result Date: 8/30/2020  PROCEDURE:  XR CHEST AP/PA (1 VIEW) (CPT=71045)  TECHNIQUE:  AP chest radiograph was obtained.   COMPARISON:  [facility] , XR, XR CHEST AP PORTABLE  (CPT=71010), 10/06/2013, 12:52 PM radiology report from [facility].     Dictated by (CST): Ruth Xiong MD on 8/30/2020 at 7:44 AM     Finalized by (CST): Ruth Xiong MD on 8/30/2020 at 7:45 AM       Xr Knee (1 Or 2 Views), Right (cpt=73560)    Result Date: 8/30/2020  PROCEDURE:  XR KN There are no acute appearing intraparenchymal brain abnormalities. There is nothing specific for acute territorial infarct. There is no hemorrhage or mass lesion. SINUSES:  There is no sign for ACUTE sinusitis. MASTOIDS:  The mastoids are clear.   SKULL detemir (LEVEMIR) 100 UNIT/ML flextouch 15 Units, 15 Units, Subcutaneous, Daily  glucose (DEX4) oral liquid 15 g, 15 g, Oral, Q15 Min PRN    Or  Glucose-Vitamin C (DEX-4) chewable tab 4 tablet, 4 tablet, Oral, Q15 Min PRN    Or  dextrose 50 % injection 50 ancef, cellulitis likely source   -UA c wbc, rare bacteria, Ucx NG  -PCT 1.88, lactate neg;   -IV abx, appreciate ID recommendations     # Acute respiratory failure (much improved)- resolved, on RA when awake  - on room air today, using CPAP when sleeping

## 2020-09-06 NOTE — RESPIRATORY THERAPY NOTE
[facility] - Equipment Use Daily Summary:  · Set Mode CPAP  · Usage in hours: 17:40  · 90% Pressure (EPAP) level: 18.0  · 90% Insp Pressure (IPAP):   · AHI: 6.2  · Supplemental Oxygen:   · Comments:

## 2020-09-07 LAB
ALBUMIN SERPL-MCNC: 2.3 G/DL (ref 3.4–5)
ANION GAP SERPL CALC-SCNC: 2 MMOL/L (ref 0–18)
BASOPHILS # BLD: 0 X10(3) UL (ref 0–0.2)
BASOPHILS NFR BLD: 0 %
BUN BLD-MCNC: 34 MG/DL (ref 7–18)
BUN/CREAT SERPL: 28.3 (ref 10–20)
CALCIUM BLD-MCNC: 8.5 MG/DL (ref 8.5–10.1)
CHLORIDE SERPL-SCNC: 109 MMOL/L (ref 98–112)
CO2 SERPL-SCNC: 28 MMOL/L (ref 21–32)
CREAT BLD-MCNC: 1.2 MG/DL (ref 0.7–1.3)
DEPRECATED RDW RBC AUTO: 54.5 FL (ref 35.1–46.3)
EOSINOPHIL # BLD: 0.12 X10(3) UL (ref 0–0.7)
EOSINOPHIL NFR BLD: 1 %
ERYTHROCYTE [DISTWIDTH] IN BLOOD BY AUTOMATED COUNT: 16.2 % (ref 11–15)
GLUCOSE BLD-MCNC: 164 MG/DL (ref 70–99)
GLUCOSE BLD-MCNC: 176 MG/DL (ref 70–99)
GLUCOSE BLD-MCNC: 192 MG/DL (ref 70–99)
GLUCOSE BLD-MCNC: 203 MG/DL (ref 70–99)
GLUCOSE BLD-MCNC: 207 MG/DL (ref 70–99)
HAV IGM SER QL: 1.8 MG/DL (ref 1.6–2.6)
HCT VFR BLD AUTO: 31.1 % (ref 39–53)
HGB BLD-MCNC: 9.3 G/DL (ref 13–17.5)
LYMPHOCYTES NFR BLD: 1.87 X10(3) UL (ref 1–4)
LYMPHOCYTES NFR BLD: 16 %
MCH RBC QN AUTO: 27.4 PG (ref 26–34)
MCHC RBC AUTO-ENTMCNC: 29.9 G/DL (ref 31–37)
MCV RBC AUTO: 91.7 FL (ref 80–100)
MONOCYTES # BLD: 0.82 X10(3) UL (ref 0.1–1)
MONOCYTES NFR BLD: 7 %
MORPHOLOGY: NORMAL
MYELOCYTES # BLD: 0.12 X10(3) UL
MYELOCYTES NFR BLD: 1 %
NEUTROPHILS # BLD AUTO: 8.21 X10 (3) UL (ref 1.5–7.7)
NEUTROPHILS NFR BLD: 72 %
NEUTS BAND NFR BLD: 3 %
NEUTS HYPERSEG # BLD: 8.78 X10(3) UL (ref 1.5–7.7)
OSMOLALITY SERPL CALC.SUM OF ELEC: 300 MOSM/KG (ref 275–295)
PHOSPHATE SERPL-MCNC: 2.6 MG/DL (ref 2.5–4.9)
PLATELET # BLD AUTO: 277 10(3)UL (ref 150–450)
PLATELET MORPHOLOGY: NORMAL
POTASSIUM SERPL-SCNC: 4.6 MMOL/L (ref 3.5–5.1)
RBC # BLD AUTO: 3.39 X10(6)UL (ref 4.3–5.7)
SODIUM SERPL-SCNC: 139 MMOL/L (ref 136–145)
TOTAL CELLS COUNTED: 100
WBC # BLD AUTO: 11.7 X10(3) UL (ref 4–11)

## 2020-09-07 PROCEDURE — 85025 COMPLETE CBC W/AUTO DIFF WBC: CPT | Performed by: INTERNAL MEDICINE

## 2020-09-07 PROCEDURE — 83735 ASSAY OF MAGNESIUM: CPT | Performed by: INTERNAL MEDICINE

## 2020-09-07 PROCEDURE — 85007 BL SMEAR W/DIFF WBC COUNT: CPT | Performed by: INTERNAL MEDICINE

## 2020-09-07 PROCEDURE — 85027 COMPLETE CBC AUTOMATED: CPT | Performed by: INTERNAL MEDICINE

## 2020-09-07 PROCEDURE — 80069 RENAL FUNCTION PANEL: CPT | Performed by: INTERNAL MEDICINE

## 2020-09-07 PROCEDURE — 82962 GLUCOSE BLOOD TEST: CPT

## 2020-09-07 RX ORDER — FUROSEMIDE 10 MG/ML
40 INJECTION INTRAMUSCULAR; INTRAVENOUS
Status: DISCONTINUED | OUTPATIENT
Start: 2020-09-07 | End: 2020-09-09

## 2020-09-07 NOTE — PLAN OF CARE
Sinus rhythm  deny chest pain deny dyspnea  Breath sounds crackles  iv lasix blair catheter;routine care  Uses cpap when sleeping  room air spo2 stable mid 90's    Bariatric bed in use; turn q 2 hrs  viking lift for transfers  Dependent to staff w/ acti Incentive Spirometry  - Assess the need for suctioning and perform as needed  - Assess and instruct to report SOB or any respiratory difficulty  - Respiratory Therapy support as indicated  - Manage/alleviate anxiety  - Monitor for signs/symptoms of CO2 ret precautions)  Outcome: Progressing     Problem: MUSCULOSKELETAL - ADULT  Goal: Return mobility to safest level of function  Description  INTERVENTIONS:  - Assess patient stability and activity tolerance for standing, transferring and ambulating w/ or w/o a

## 2020-09-07 NOTE — PROGRESS NOTES
POLA Hospitalist Progress Note     [facility]      SUBJECTIVE:  Still having some pain in legs  Room air, BiPAP with sleep  IV lasix increased    OBJECTIVE:  Temp:  [97.7 °F (36.5 °C)-98.5 °F (36.9 °C)] 98.3 °F (36.8 °C)  Pulse:  [75-99] 87  Resp:  [ chest radiograph was obtained.   COMPARISON:  [facility] , XR, XR CHEST AP PORTABLE  (CPT=71010), 10/06/2013, 12:52 PM.  INDICATIONS:  bilateral knee pain/numbness  PATIENT STATED HISTORY: (As transcribed by Technologist)     FINDINGS:  LUNGS:  Patient is sligh 8/30/2020 at 7:45 AM       Xr Knee (1 Or 2 Views), Right (cpt=73560)    Result Date: 8/30/2020  PROCEDURE:  XR KNEE (1 OR 2 VIEWS), RIGHT (CPT=73560)  COMPARISON:  [facility] , XR, KNEE (AP   LATERAL),RIGHT XRAY, 10/26/2011, 12:45 PM.  INDICATIONS:  bilateral hemorrhage or mass lesion. SINUSES:  There is no sign for ACUTE sinusitis. MASTOIDS:  The mastoids are clear. SKULL:  No evidence for fracture or osseous abnormality. OTHER:  None. CONCLUSION:  Chronic brain changes are present.   No acute pr Min PRN    Or  Glucose-Vitamin C (DEX-4) chewable tab 4 tablet, 4 tablet, Oral, Q15 Min PRN    Or  dextrose 50 % injection 50 mL, 50 mL, Intravenous, Q15 Min PRN    Or  glucose (DEX4) oral liquid 30 g, 30 g, Oral, Q15 Min PRN    Or  Glucose-Vitamin C (DEX- respiratory failure (much improved)- resolved, on RA when awake  - on room air today, using CPAP when sleeping  - some fluid overload, less likely PNA, no marked cough/fevers  - difficult as got fluid for hypoTN  - urine strep/legionella neg, sputum cx  -

## 2020-09-07 NOTE — PROGRESS NOTES
[facility]    Nephrology Progress Note    Meir Sosa Attending:   Jailene Mansfield MD     Cc: Bruce Lopez    SUBJECTIVE     On BIPAP resting comfortably    PHYSICAL EXAM   Vital signs: /65 (BP Location: Right arm)   Pulse 87   Temp 98.2 °F (36.8 °C (TYLENOL) tab 650 mg, 650 mg, Oral, Q6H PRN  ondansetron (ZOFRAN-ODT) disintegrating tab 4 mg, 4 mg, Oral, Q6H PRN    Or  ondansetron HCl (ZOFRAN) injection 4 mg, 4 mg, Intravenous, Q6H PRN  Insulin Aspart Pen (NOVOLOG) 100 UNIT/ML flexpen 2-10 Units, 2-10 under distended. .  The right kidney is seen, showing a     simple appearing cyst upper pole, measuring up to 4.7 cm. No acute right     kidney process. The left kidney     cannot be visualized, apparently because of bowel gas.                    Dictated narrowing with marginal osteophytes consistent with degenerative change. Surrounding soft tissue prominence.   Suboptimal lateral cross-table     projection, suprapatellar joint effusion     cannot be excluded.                               =====    CO Result    PROCEDURE:  CT BRAIN OR HEAD (44692)         COMPARISON:  None. INDICATIONS:  bilateral knee pain/numbness         TECHNIQUE:  Noncontrast CT scanning is performed through the brain. Dose     reduction techniques were used.  Dose informati medial 4.0 x 4.4 x 4.7 cm having simple appearance. LEFT KIDNEY   No left kidney visible. This appears secondary to bowel gas most likely. BLADDER:  The urinary bladder is not visible.   OTHER:  Negative.                   =====  CONCLUSION:

## 2020-09-07 NOTE — PROGRESS NOTES
Cardiology Consultation  1 [facility] Patient Status:  Inpatient    1960 MRN [id number]   [facility] 4SW-A Attending Rodger Mclain MD   [facility] Day # 8 PCP Reyna Ely MD         Assessment/Plan:  63 year ol type 2 diabetes mellitus ([facility] [address] 4/6/2012    by exam and symptoms   • Disc herniation Dx in 1/2012    L5-S1 disc herniation after slipping on ice.  This was medically managed   • DJD (degenerative joint disease) of knee 2/2/2012    bilat-received Synvisc-One tablet (5 mg total) by mouth 2 (two) times daily. , Disp: 180 tablet, Rfl: 3  Metoprolol Succinate ER 50 MG Oral Tablet 24 Hr, Take 1 tablet (50 mg total) by mouth daily. , Disp: 90 tablet, Rfl: 3  lisinopril 2.5 MG Oral Tab, Take 1 tablet (2.5 mg total) by denies hematuria, dysuria, incontinence  MSK: denies muscle or joint pains  Neuro: denies numbness, weakness, paresthesias  Psych: denies anxiety, depression  Integument: denies skin rashes or lesions  Heme: denies easy bruising or bleeding  Endo: denies h Component Value Date    CREATSERUM 1.10 09/06/2020    CREATSERUM 1.07 09/05/2020    CREATSERUM 1.10 09/04/2020     Cholesterol:     Lab Results   Component Value Date    CHOLEST 185.00 06/18/2019    CHOLEST 209 (H) 11/30/2018    CHOLEST 149 05/26/2018

## 2020-09-07 NOTE — PLAN OF CARE
Problem: BLE cellulitis, fluid overload  Data: Patient alert and oriented overnight, on room air while awake and wearing cpap with sleep to maintain O2 saturation >90%. Patient reports pain to bilateral feet, dressings to BLE C/D/I at this time.  Patient ha saturation or ABGs  - Provide Smoking Cessation handout, if applicable  - Encourage broncho-pulmonary hygiene including cough, deep breathe, Incentive Spirometry  - Assess the need for suctioning and perform as needed  - Assess and instruct to report SOB o by assessment.  - Educate pt/family on patient safety including physical limitations  - Instruct pt to call for assistance with activity based on assessment  - Modify environment to reduce risk of injury  - Provide assistive devices as appropriate  - Consi

## 2020-09-07 NOTE — RESPIRATORY THERAPY NOTE
[facility] - Equipment Use Daily Summary:  · Set Mode CPAP  · Usage in hours: 14:30  · 90% Pressure (EPAP) level: 18.0  · 90% Insp Pressure (IPAP):   · AHI: 6.6  · Supplemental Oxygen:   · Comments:

## 2020-09-07 NOTE — PROGRESS NOTES
INFECTIOUS DISEASE PROGRESS NOTE    [facility] Patient Status:  Inpatient    1960 MRN [id number]   [facility] 4SW-A Attending Efren Cesar MD   [facility] Day # 8 PCP Gisela Silva cap 60 mg, 60 mg, Oral, Daily  •  atorvastatin (LIPITOR) tab 20 mg, 20 mg, Oral, Nightly  •  apixaban (ELIQUIS) tab 5 mg, 5 mg, Oral, BID  •  cyclobenzaprine (FLEXERIL) tab 10 mg, 10 mg, Oral, TID PRN    Review of Systems:  Completed.  See pertinent positiv hours.      Microbiology    Reviewed in EMR,     Radiology: Reviewed. ASSESSMENT:  1. GPC septic shock (Group G strep)- assume source is LLE cellulitis  2. GPC (strep sp) BSI due to above  3. LLE cellulitis- secondary to GGS  4.  Resp failure- agree mo

## 2020-09-08 LAB
ALBUMIN SERPL-MCNC: 2.3 G/DL (ref 3.4–5)
ANION GAP SERPL CALC-SCNC: 4 MMOL/L (ref 0–18)
BUN BLD-MCNC: 29 MG/DL (ref 7–18)
BUN/CREAT SERPL: 33 (ref 10–20)
CALCIUM BLD-MCNC: 8.6 MG/DL (ref 8.5–10.1)
CHLORIDE SERPL-SCNC: 110 MMOL/L (ref 98–112)
CO2 SERPL-SCNC: 26 MMOL/L (ref 21–32)
CREAT BLD-MCNC: 0.88 MG/DL (ref 0.7–1.3)
GLUCOSE BLD-MCNC: 160 MG/DL (ref 70–99)
GLUCOSE BLD-MCNC: 168 MG/DL (ref 70–99)
GLUCOSE BLD-MCNC: 176 MG/DL (ref 70–99)
GLUCOSE BLD-MCNC: 203 MG/DL (ref 70–99)
GLUCOSE BLD-MCNC: 222 MG/DL (ref 70–99)
HAV IGM SER QL: 1.8 MG/DL (ref 1.6–2.6)
OSMOLALITY SERPL CALC.SUM OF ELEC: 299 MOSM/KG (ref 275–295)
PHOSPHATE SERPL-MCNC: 2.6 MG/DL (ref 2.5–4.9)
POTASSIUM SERPL-SCNC: 4.3 MMOL/L (ref 3.5–5.1)
SODIUM SERPL-SCNC: 140 MMOL/L (ref 136–145)

## 2020-09-08 PROCEDURE — 97116 GAIT TRAINING THERAPY: CPT

## 2020-09-08 PROCEDURE — 83735 ASSAY OF MAGNESIUM: CPT | Performed by: INTERNAL MEDICINE

## 2020-09-08 PROCEDURE — 97535 SELF CARE MNGMENT TRAINING: CPT

## 2020-09-08 PROCEDURE — 97530 THERAPEUTIC ACTIVITIES: CPT

## 2020-09-08 PROCEDURE — 82962 GLUCOSE BLOOD TEST: CPT

## 2020-09-08 PROCEDURE — 80069 RENAL FUNCTION PANEL: CPT | Performed by: INTERNAL MEDICINE

## 2020-09-08 NOTE — PROGRESS NOTES
INFECTIOUS DISEASE PROGRESS NOTE    [facility] Patient Status:  Inpatient    1960 MRN [id number]   [facility] 4SW-A Attending Shiva Tello MD   [facility] Day # 9 PCP Demetria Suárez •  DULoxetine HCl (CYMBALTA) DR particles cap 60 mg, 60 mg, Oral, Daily  •  atorvastatin (LIPITOR) tab 20 mg, 20 mg, Oral, Nightly  •  apixaban (ELIQUIS) tab 5 mg, 5 mg, Oral, BID  •  cyclobenzaprine (FLEXERIL) tab 10 mg, 10 mg, Oral, TID PRN    Review of No results for input(s): Kimi Laughlin, [address], [address], [address], [address], [address] [facility], [address] [facility], [address], [address], NITRITE, LEUUR, WBCUR, RBCUR, BACUR, EPIUR in the last 168 hours. Microbiology    Reviewed in EMR,     Radiology: Reviewed.       ASS

## 2020-09-08 NOTE — PROGRESS NOTES
[facility]    Nephrology Progress Note    Melania Form Attending:  Manny Kim MD       SUBJECTIVE:     Tolerating diuresis  Has some swelling on left side  No urinary complaints    PHYSICAL EXAM:     Vital Signs: /76 (BP Location: Right ar 91.7 09/07/2020    MCH 27.4 09/07/2020    MCHC 29.9 (L) 09/07/2020    RDW 16.2 (H) 09/07/2020    NEPRELIM 8.21 (H) 09/07/2020    NEUTABS 8.78 (H) 09/07/2020    LYMPHABS 1.87 09/07/2020    EOSABS 0.12 09/07/2020    BASABS 0.00 09/07/2020    NEUT 72 09/07/20 8 tablet, 8 tablet, Oral, Q15 Min PRN  Metoprolol Succinate ER (Toprol XL) 24 hr tab 25 mg, 25 mg, Oral, 2x Daily(Beta Blocker)  docusate sodium (COLACE) cap 100 mg, 100 mg, Oral, BID  bisacodyl (DULCOLAX) EC tab 5 mg, 5 mg, Oral, Daily PRN  ceFAZolin in N

## 2020-09-08 NOTE — PHYSICAL THERAPY NOTE
PHYSICAL THERAPY TREATMENT NOTE - INPATIENT    Room Number: 7896/8076-H     Session: 2/6   Number of Visits to Meet Established Goals: 6    Presenting Problem: s/p syncope and fall at home; a-fib/flutter w/RVR; respiratory failure; septic shock w/suspecte cholesterol    • Hypertension    • Lipid screening 2/23/2011   • Mixed hyperlipidemia    • Morbid obesity ([facility] [address]    • Osteoarthritis    • Osteoarthritis, localized, knee 10/26/2011    left   • Renal insufficiency, mild July 2010    Was hospitalized due to d another person does the patient currently need. ..   -   Moving to and from a bed to a chair (including a wheelchair)?: Total   -   Need to walk in hospital room?: Total   -   Climbing 3-5 steps with a railing?: Total       AM-PAC Score:  Raw Score: 7   Deni progress towards all goals. Pt demo's reduced edema in BLE and able to mobilize trunk, BUE and BLE with improvement this session.  Pt cont to require the total lift for t/f in/out bed, however able to achieve sit/stand with 3 person assist and use of bariat

## 2020-09-08 NOTE — PAYOR COMM NOTE
--------------  9/5-7 CONTINUED STAY REVIEW    Payor: [employer]NA PPO  Subscriber #:  [id number]  Authorization Number: [id number]    9/5:    CARDS:  Assessment/Plan:  63year old male presenting with:     1) Fall  2) SOPHIA on CKD creat improving   3) Recently di 09/01/20  0422 09/02/20  0709 09/03/20  0611   RBC 3.58* 3.50* 3.29* 3.61* 3.78*   HGB 9.9* 9.7* 9.1* 10.0* 10.5*   HCT 32.4* 32.5* 30.0* 34.0* 33.6*   MCV 90.5 92.9 91.2 94.2 88.9   MCH 27.7 27.7 27.7 27.7 27.8   MCHC 30.6* 29.8* 30.3* 29.4* 31.3   RDW 16 transition to po abx before discharge.  Legs slowly improving but would like them to be more improved prior to d/c.        9/6:    CARDS:  - CXR, elevated BNP and physical exam suggest volume overload  - TTE to assess LV structure/function EF 25-30 %   - 1. 10 1.07 1.10   CA 8.7 8.4*  --  9.1 8.7 8.7   MG 2.0 1.9  --  2.0 2.0 1.9   PHOS 3.0 2.3*  --  1.9* 2.1* 2.3*   * 165*  --  181* 194* 215*                   Recent Labs   Lab 08/31/20  0418   09/02/20  0709 09/03/20  1938 09/04/20  0537 09/05/20 Type 2 diabetes mellitus  - Hyperglycemic protocol with accu-checks QID and med-dose sliding scale insulin. Will keep 1800 ADA diet.  Will hold PO meds  - levemir 52u at home, dec PO intake here plus ARF causing lower BG  - 15u for now, titrate up for hyper regular rate and rhythm, + peripheral edema  Abd: Abdomen soft, nontender, nondistended, no organomegaly, bowel sounds present  MSK: Moving extremities, redness and dressings in place b/l, no clubbing, no cyanosis  Skin: no rashes or lesions     Data Revie mg     Date Action Dose Route User    9/8/2020 0527 Given 10 mg Oral Celeset Orozco RN    9/7/2020 2015 Given 10 mg Oral Blossom Reeves RN      docusate sodium (COLACE) cap 100 mg     Date Action Dose Route User    9/8/2020 1045 Given 100 mg Oral Bhupinder Abi Tung Mathis, RN

## 2020-09-08 NOTE — RESPIRATORY THERAPY NOTE
[facility] Equipment Usage Summary :            Set Mode :CPAP W FLEX          Usage in Hours:13;00          90% Pressure (EPAP) : 8           90% Insp Pressure (IPAP);           AHI : 5.2          Supplemental Oxygen LPM :5

## 2020-09-08 NOTE — PROGRESS NOTES
[facility] Hospitalist Progress Note     [facility]      SUBJECTIVE:  Feeling fine, having more leg cramping today  Good urine output    OBJECTIVE:  Temp:  [98 °F (36.7 °C)-98.3 °F (36.8 °C)] 98.2 °F (36.8 °C)  Pulse:  [77-95] 82  Resp:  [18-20] 18  BP: (1 bilateral knee pain/numbness  PATIENT STATED HISTORY: (As transcribed by Technologist)     FINDINGS:  LUNGS:  Patient is slightly tilted towards the right. Cardiomegaly with pulmonary vasculature redistribution and increased interstitial opacities.   No r RIGHT (CPT=73560)  COMPARISON:  [facility] , XR, KNEE (AP   LATERAL),RIGHT XRAY, 10/26/2011, 12:45 PM.  INDICATIONS:  bilateral knee pain/numbness  PATIENT STATED HISTORY: (As transcribed by Technologist)  Bilateral knee pain and numbness.     FINDINGS:  BONES: fracture or osseous abnormality. OTHER:  None. CONCLUSION:  Chronic brain changes are present. No acute process seen.    Dictated by (CST): Ramonita Coelho MD on 8/30/2020 at 9:42 AM     Finalized by (CST): Ramonita Coelho MD on 8/30/2020 at 9: Intravenous, Q15 Min PRN    Or  glucose (DEX4) oral liquid 30 g, 30 g, Oral, Q15 Min PRN    Or  Glucose-Vitamin C (DEX-4) chewable tab 8 tablet, 8 tablet, Oral, Q15 Min PRN  Metoprolol Succinate ER (Toprol XL) 24 hr tab 25 mg, 25 mg, Oral, 2x Daily(Beta Bl got fluid for hypoTN  - urine strep/legionella neg, sputum cx  - IV diuresis, monitor renal function, putting out good urine  - IV lasix per cards --> BID. Put out good urine yesterday.  Wt down     # Acute renal failure-resolved  - likely pre-renal d/t dec

## 2020-09-08 NOTE — PAYOR COMM NOTE
--------------  9/8 CONTINUED STAY REVIEW    Payor: DIANNA HELTON  Subscriber #:  [id number]  Authorization Number: [id number]    9/8:    HOSPITALIST:  having more leg cramping today  Good urine output     OBJECTIVE:  Temp:  [98 °F (36.7 °C)-98.3 °F (36.8 °C) insulin detemir (LEVEMIR) 100 UNIT/ML flextouch 20 Units, 20 Units, Subcutaneous, Daily  HYDROcodone-acetaminophen (NORCO)  MG per tab 1-2 tablet, 1-2 tablet, Oral, Q6H PRN  glucose (DEX4) oral liquid 15 g, 15 g, Oral, Q15 Min PRN    Or  Glucose-Carrie - blood cxs c GPC in chains-->Strep G, repeat negative  - apprec ID recs, IV ancef, cellulitis likely source --> Plan for PO at d/c  - IV abx, appreciate ID recommendations     # Acute respiratory failure - resolved, on RA when awake  # Acute on chronic sy -dilaudid stopped yesterday with drowsiness  -on gabapentin     # Depression/anxiety  -SSRI     # altered mental status, RESOLVED  - improved  - suspect related to IV dilaudid.  STOP IV pain meds     Prophy:  DVT: heparin suBQ  Deconditioning prevention: PT

## 2020-09-08 NOTE — CM/SW NOTE
Sent updates to [facility] [employer] in [address]. Informed them of potential dc Wednesday.  &  to remain available and supportive for discharge planning needs.

## 2020-09-08 NOTE — PLAN OF CARE
Assumed pt care at [address] [facility]. A&Ox4. RA/CPAP HS, denies chest pain/SOB, lung sounds diminished. VSS, NSR on tele. Doran in place, continent. Up with total lift, turn q2. Dressings present on BLE, coccyx, hip, and back.  POC IV lasix, IV ancef, SW for D/C plannin baseline  Description  INTERVENTIONS:  - Continuous cardiac monitoring, monitor vital signs, obtain 12 lead EKG if indicated  - Evaluate effectiveness of antiarrhythmic and heart rate control medications as ordered  - Initiate emergency measures for life t patient's volume status, including labs, urine output, blood pressure (other measures as available)  - Encourage oral intake as appropriate  - Instruct patient on fluid and nutrition restrictions as appropriate  Outcome: Progressing     Problem: SKIN/TISSU Utilize distraction and/or relaxation techniques  - Monitor for opioid side effects  - Notify MD/LIP if interventions unsuccessful or patient reports new pain  - Anticipate increased pain with activity and pre-medicate as appropriate  Outcome: Progressing part  Description  INTERVENTIONS:  - Support and protect limb and body alignment per provider's orders  - Instruct and reinforce with patient and family use of appropriate assistive device and precautions (e.g. spinal or hip dislocation precautions)  Kirstin Waldrop

## 2020-09-08 NOTE — PROGRESS NOTES
Cardiology Consultation  [facility] Patient Status:  Inpatient    1960 MRN [id number]   [facility] 4SW-A Attending Krystal Fink MD   [facility] Day # 5 PCP Andrea Shipley MD         Assessment/Plan:  63 year ol neuropathy associated with type 2 diabetes mellitus ([facility] [address] 4/6/2012    by exam and symptoms   • Disc herniation Dx in 1/2012    L5-S1 disc herniation after slipping on ice.  This was medically managed   • DJD (degenerative joint disease) of knee 2/2/2012 apixaban 5 MG Oral Tab, Take 1 tablet (5 mg total) by mouth 2 (two) times daily. , Disp: 180 tablet, Rfl: 3  Metoprolol Succinate ER 50 MG Oral Tablet 24 Hr, Take 1 tablet (50 mg total) by mouth daily. , Disp: 90 tablet, Rfl: 3  lisinopril 2.5 MG Oral Tab, diarrhea or constipation  : denies hematuria, dysuria, incontinence  MSK: denies muscle or joint pains  Neuro: denies numbness, weakness, paresthesias  Psych: denies anxiety, depression  Integument: denies skin rashes or lesions  Heme: denies easy bruisi 09/06/2020     Lab Results   Component Value Date    CREATSERUM 0.88 09/08/2020    CREATSERUM 1.20 09/07/2020    CREATSERUM 1.10 09/06/2020     Cholesterol:     Lab Results   Component Value Date    CHOLEST 185.00 06/18/2019    CHOLEST 209 (H) 11/30/2018

## 2020-09-08 NOTE — OCCUPATIONAL THERAPY NOTE
OCCUPATIONAL THERAPY TREATMENT NOTE - INPATIENT     Room Number: 5285/7855-C  Session: 2  Number of Visits to Meet Established Goals: 5    Presenting Problem: fall    History related to current admission:   Pt is 63year old male admitted on 8/30/2020 from Osteoarthritis, localized, knee 10/26/2011    left   • Renal insufficiency, mild July 2010    Was hospitalized due to dehydration and had acute renal insufficiency (creatinine 1.85)   • Scalp laceration Dx in 3/2012    He hit his head at work and mitra Patient educated on OT role, goals, plan of care, recommendations and safety. Patient alert, oriented x 3 and following multi-step motor commands  B lower legs wrapped in gauze bandaging w/ no noted weeping through bandages.   Patient has a blister right u assist  Patient will perform toileting: with min assist     Functional Transfer Goals  Patient will transfer in bed by rolling:  with min assist  Patient will transfer from supine to sit:  with min assist  Patient will transfer to bedside commode:  with mi

## 2020-09-09 ENCOUNTER — APPOINTMENT (OUTPATIENT)
Dept: ULTRASOUND IMAGING | Facility: HOSPITAL | Age: 60
DRG: 871 | End: 2020-09-09
Attending: INTERNAL MEDICINE
Payer: COMMERCIAL

## 2020-09-09 LAB
ALBUMIN SERPL-MCNC: 2.2 G/DL (ref 3.4–5)
ANION GAP SERPL CALC-SCNC: 4 MMOL/L (ref 0–18)
BUN BLD-MCNC: 30 MG/DL (ref 7–18)
BUN/CREAT SERPL: 30.6 (ref 10–20)
CALCIUM BLD-MCNC: 8.7 MG/DL (ref 8.5–10.1)
CHLORIDE SERPL-SCNC: 109 MMOL/L (ref 98–112)
CO2 SERPL-SCNC: 27 MMOL/L (ref 21–32)
CREAT BLD-MCNC: 0.98 MG/DL (ref 0.7–1.3)
GLUCOSE BLD-MCNC: 165 MG/DL (ref 70–99)
GLUCOSE BLD-MCNC: 170 MG/DL (ref 70–99)
GLUCOSE BLD-MCNC: 201 MG/DL (ref 70–99)
GLUCOSE BLD-MCNC: 211 MG/DL (ref 70–99)
HAV IGM SER QL: 1.7 MG/DL (ref 1.6–2.6)
OSMOLALITY SERPL CALC.SUM OF ELEC: 300 MOSM/KG (ref 275–295)
PHOSPHATE SERPL-MCNC: 2.7 MG/DL (ref 2.5–4.9)
POTASSIUM SERPL-SCNC: 4.2 MMOL/L (ref 3.5–5.1)
SODIUM SERPL-SCNC: 140 MMOL/L (ref 136–145)

## 2020-09-09 PROCEDURE — 93970 EXTREMITY STUDY: CPT | Performed by: INTERNAL MEDICINE

## 2020-09-09 PROCEDURE — 83735 ASSAY OF MAGNESIUM: CPT | Performed by: INTERNAL MEDICINE

## 2020-09-09 PROCEDURE — 80069 RENAL FUNCTION PANEL: CPT | Performed by: INTERNAL MEDICINE

## 2020-09-09 PROCEDURE — 36410 VNPNXR 3YR/> PHY/QHP DX/THER: CPT

## 2020-09-09 PROCEDURE — 76937 US GUIDE VASCULAR ACCESS: CPT

## 2020-09-09 PROCEDURE — 93971 EXTREMITY STUDY: CPT | Performed by: INTERNAL MEDICINE

## 2020-09-09 PROCEDURE — 82962 GLUCOSE BLOOD TEST: CPT

## 2020-09-09 RX ORDER — METOPROLOL SUCCINATE 25 MG/1
25 TABLET, EXTENDED RELEASE ORAL ONCE
Status: COMPLETED | OUTPATIENT
Start: 2020-09-09 | End: 2020-09-09

## 2020-09-09 RX ORDER — METOPROLOL SUCCINATE 50 MG/1
50 TABLET, EXTENDED RELEASE ORAL
Status: DISCONTINUED | OUTPATIENT
Start: 2020-09-10 | End: 2020-09-10

## 2020-09-09 RX ORDER — FUROSEMIDE 40 MG/1
40 TABLET ORAL DAILY
Status: DISCONTINUED | OUTPATIENT
Start: 2020-09-09 | End: 2020-09-10

## 2020-09-09 RX ORDER — CEFADROXIL 500 MG/1
1 CAPSULE ORAL 2 TIMES DAILY
Qty: 28 CAPSULE | Refills: 0 | Status: SHIPPED | OUTPATIENT
Start: 2020-09-09 | End: 2020-09-16

## 2020-09-09 NOTE — PLAN OF CARE
Patient is alert and oriented. On room air, CPAP at night. NSR on the tele. Denies any pain or SOB. Patient was taken to ultrasound for left arm and BLE right at change of shift. Ultrasound showed a thrombus, IV was still inserted in left AC.  Writer remove

## 2020-09-09 NOTE — PROGRESS NOTES
[facility] Hospitalist Progress Note     [facility]      SUBJECTIVE:  No acute events overnight  Feeling ok, tired and has headaches  US done this AM    OBJECTIVE:  Temp:  [98.2 °F (36.8 °C)-99 °F (37.2 °C)] 98.3 °F (36.8 °C)  Pulse:  [82-89] 86  Resp:  [1 PM.  INDICATIONS:  bilateral knee pain/numbness  PATIENT STATED HISTORY: (As transcribed by Technologist)     FINDINGS:  LUNGS:  Patient is slightly tilted towards the right.   Cardiomegaly with pulmonary vasculature redistribution and increased interstitia (1 OR 2 VIEWS), RIGHT (CPT=73560)  COMPARISON:  [facility] , XR, KNEE (AP   LATERAL),RIGHT XRAY, 10/26/2011, 12:45 PM.  INDICATIONS:  bilateral knee pain/numbness  PATIENT STATED HISTORY: (As transcribed by Technologist)  Bilateral knee pain and numbness.     Deny Ram No evidence for fracture or osseous abnormality. OTHER:  None. CONCLUSION:  Chronic brain changes are present. No acute process seen.    Dictated by (CST): Haley Cormier MD on 8/30/2020 at 9:42 AM     Finalized by (CST): Haley Cormier MD on liquid 15 g, 15 g, Oral, Q15 Min PRN    Or  Glucose-Vitamin C (DEX-4) chewable tab 4 tablet, 4 tablet, Oral, Q15 Min PRN    Or  dextrose 50 % injection 50 mL, 50 mL, Intravenous, Q15 Min PRN    Or  glucose (DEX4) oral liquid 30 g, 30 g, Oral, Q15 Min PRN sleeping  - Some fluid overload, less likely PNA, no marked cough/fevers  - Difficult as got fluid for hypoTN  - Urine strep/legionella neg, sputum cx  - IV diuresis, monitor renal function, putting out good urine  - IV lasix changed to PO     # Acute zac ready    Candida No  Internal Medicine  [facility] Hospitalist

## 2020-09-09 NOTE — RESPIRATORY THERAPY NOTE
[facility] Equipment Usage Summary :            Set Mode :AUTO CPAP W FLEX          Usage in Hours:13;04          90% Pressure (EPAP) : 18           90% Insp Pressure (IPAP);           AHI : 2.3          Supplemental Oxygen LPM :5

## 2020-09-09 NOTE — PLAN OF CARE
Ass[facility] at [facility] A&Ox4. RA/CPAP HS, denies chest pain/SOB, lung sounds diminished. VSS, NSR on tele. Edema to BLE/feet. Abdominal edema, abdomen firm/distended. Doran in place, continent. Up with total lift, pt turns self q2.  Dressings present on B assess for edema, trend weights  Outcome: Progressing  Goal: Absence of cardiac arrhythmias or at baseline  Description  INTERVENTIONS:  - Continuous cardiac monitoring, monitor vital signs, obtain 12 lead EKG if indicated  - Evaluate effectiveness of anti osmolarity and serum sodium as indicated or ordered  - Monitor response to interventions for patient's volume status, including labs, urine output, blood pressure (other measures as available)  - Encourage oral intake as appropriate  - Instruct patient on Consider cultural and social influences on pain and pain management  - Manage/alleviate anxiety  - Utilize distraction and/or relaxation techniques  - Monitor for opioid side effects  - Notify MD/LIP if interventions unsuccessful or patient reports new hortencia patient/family  Outcome: Progressing  Goal: Maintain proper alignment of affected body part  Description  INTERVENTIONS:  - Support and protect limb and body alignment per provider's orders  - Instruct and reinforce with patient and family use of appropria

## 2020-09-09 NOTE — CM/SW NOTE
RN informed ana that pt may be ready for dc tomorrow. ANA sent updates to [facility] via [employer]. They may need to get new auth. Ana left message for wife.     José Miguel Ortega [facility]  /Discharge Planner  [phone]

## 2020-09-09 NOTE — PROGRESS NOTES
[facility]    Nephrology Progress Note    Levar Aleman Attending:  Dylan Humphrey MD       SUBJECTIVE:     Swelling has improved overall  Remains with blair, no complaints    PHYSICAL EXAM:     Vital Signs: /50 (BP Location: Right arm)   Pulse 09/07/2020    MCH 27.4 09/07/2020    MCHC 29.9 (L) 09/07/2020    RDW 16.2 (H) 09/07/2020    NEPRELIM 8.21 (H) 09/07/2020    NEUTABS 8.78 (H) 09/07/2020    LYMPHABS 1.87 09/07/2020    EOSABS 0.12 09/07/2020    BASABS 0.00 09/07/2020    NEUT 72 09/07/2020 PRN    Or  Glucose-Vitamin C (DEX-4) chewable tab 8 tablet, 8 tablet, Oral, Q15 Min PRN  docusate sodium (COLACE) cap 100 mg, 100 mg, Oral, BID  bisacodyl (DULCOLAX) EC tab 5 mg, 5 mg, Oral, Daily PRN  ceFAZolin in NS (ANCEF) 3g/100mL IVPB premix, 3 g, Int

## 2020-09-09 NOTE — DIETARY NOTE
[address]     Admitting diagnosis:  Hyperkalemia [E87.5]  Tachycardia [R00.0]  Acute kidney injury ([facility] [address] [N17.9]  Fall, initial encounter [W19. XXXA]  Hypotension, unspecified hypotension type [I95.9]    Ht: 180.3

## 2020-09-09 NOTE — PAYOR COMM NOTE
--------------  9/9 CONTINUED STAY REVIEW    Payor: DIANNA HELTON  Subscriber #:  [id number]  Authorization Number: [id number]    RENAL:    Swelling has improved overall  Remains with blair, no complaints     PHYSICAL EXAM:      Vital Signs: /50 (BP Loc pain. Some cramping in lower extremities       Integument: Dressing over legs bilaterally, proximal leg without dressing and erythema receded. + erythema and edema to left foot. ASSESSMENT:  1.  GPC septic shock (Group G strep)- assume source is LLE cell Given 3 g Intravenous Vasile Mcneill RN    9/8/2020 1804 Given 3 g Intravenous Mayela Be RN      docusate sodium (COLACE) cap 100 mg     Date Action Dose Route User    9/9/2020 1140 Given 100 mg Oral Thuan Crow RN    9/8/2020 2016 Give Date Action Dose Route User    9/9/2020 1141 Given 25 mg Oral Haritha Rasmussen RN

## 2020-09-09 NOTE — PROGRESS NOTES
Assumed care of patient today at 0730. Alert and oriented. Vital signs stable. Sinus rhythm on telemetry. Dressings changed to bilateral lower extremities per wound care orders. Sacral wound, right flank wound right abdominal wound mepilex changed.   Up

## 2020-09-09 NOTE — PROGRESS NOTES
INFECTIOUS DISEASE PROGRESS NOTE    Dina M[facility]ks Patient Status:  Inpatient    1960 MRN [id number]   [facility] 4SW-A Attending Karis Carbajal MD   Hosp Day # 10 PCP Alia Rodriges HCl (CYMBALTA) DR particles cap 60 mg, 60 mg, Oral, Daily  •  atorvastatin (LIPITOR) tab 20 mg, 20 mg, Oral, Nightly  •  apixaban (ELIQUIS) tab 5 mg, 5 mg, Oral, BID  •  cyclobenzaprine (FLEXERIL) tab 10 mg, 10 mg, Oral, TID PRN    Review of Systems:  Comp Reviewed. PROCEDURE: US VENOUS DOPPLER ARM LEFT - DIAG [facility] ([id number])    COMPARISON: None. INDICATIONS: ++ swelling, rule out DVT    TECHNIQUE: Real time, grey scale, and duplex ultrasound was used to evaluate the upper extremity venous system.  B-mo STATED HISTORY: (As transcribed by Technologist) The patient complains of bilateral leg pain. FINDINGS:   SAPHENOFEMORAL JUNCTION: No reflux. THROMBI: None visible. COMPRESSION: Normal compressibility, phasicity, and augmentation.   OTHER: Moderate bi abx as noted above.      [facility] MD

## 2020-09-09 NOTE — PROGRESS NOTES
Cardiology Consultation  [facility] Patient Status:  Inpatient    1960 MRN [id number]   [facility] 4SW-A Attending Chanel De La oRsa MD   [address] Day # 10 PCP Carrie Mendez MD         Assessment/Plan:  63 year o capsulitis     LUE   • Arrhythmia    • Chronic sinusitis    • Diabetes ([facility] [address]    • Diabetic peripheral neuropathy associated with type 2 diabetes mellitus ([facility] [address] 4/6/2012    by exam and symptoms   • Disc herniation Dx in 1/2012    L5-S1 disc herniation after weird\"    Medications:  No current facility-administered medications on file prior to encounter. apixaban 5 MG Oral Tab, Take 1 tablet (5 mg total) by mouth 2 (two) times daily. , Disp: 180 tablet, Rfl: 3  Metoprolol Succinate ER 50 MG Oral Tablet 24 Hr, swallowing  Cardiac: + dyspnea on exertion  Pulm: denies dyspnea, cough, wheeze  GI: denies n/v, abd pain, diarrhea or constipation  : denies hematuria, dysuria, incontinence  MSK: denies muscle or joint pains  Neuro: denies numbness, weakness, paresthes Lab Results   Component Value Date    BUN 30 (H) 09/09/2020    BUN 29 (H) 09/08/2020    BUN 34 (H) 09/07/2020     Lab Results   Component Value Date    CREATSERUM 0.98 09/09/2020    CREATSERUM 0.88 09/08/2020    CREATSERUM 1.20 09/07/2020     Cholester

## 2020-09-10 VITALS
HEIGHT: 71 IN | RESPIRATION RATE: 17 BRPM | WEIGHT: 315 LBS | DIASTOLIC BLOOD PRESSURE: 65 MMHG | HEART RATE: 92 BPM | OXYGEN SATURATION: 99 % | SYSTOLIC BLOOD PRESSURE: 131 MMHG | TEMPERATURE: 99 F | BODY MASS INDEX: 44.1 KG/M2

## 2020-09-10 LAB
ALBUMIN SERPL-MCNC: 2.3 G/DL (ref 3.4–5)
ANION GAP SERPL CALC-SCNC: 2 MMOL/L (ref 0–18)
BUN BLD-MCNC: 31 MG/DL (ref 7–18)
BUN/CREAT SERPL: 29.2 (ref 10–20)
CALCIUM BLD-MCNC: 8.5 MG/DL (ref 8.5–10.1)
CHLORIDE SERPL-SCNC: 107 MMOL/L (ref 98–112)
CO2 SERPL-SCNC: 30 MMOL/L (ref 21–32)
CREAT BLD-MCNC: 1.06 MG/DL (ref 0.7–1.3)
GLUCOSE BLD-MCNC: 135 MG/DL (ref 70–99)
GLUCOSE BLD-MCNC: 148 MG/DL (ref 70–99)
GLUCOSE BLD-MCNC: 160 MG/DL (ref 70–99)
GLUCOSE BLD-MCNC: 170 MG/DL (ref 70–99)
HAV IGM SER QL: 1.8 MG/DL (ref 1.6–2.6)
OSMOLALITY SERPL CALC.SUM OF ELEC: 298 MOSM/KG (ref 275–295)
PHOSPHATE SERPL-MCNC: 2.8 MG/DL (ref 2.5–4.9)
POTASSIUM SERPL-SCNC: 4.3 MMOL/L (ref 3.5–5.1)
SODIUM SERPL-SCNC: 139 MMOL/L (ref 136–145)

## 2020-09-10 PROCEDURE — 82962 GLUCOSE BLOOD TEST: CPT

## 2020-09-10 PROCEDURE — 83735 ASSAY OF MAGNESIUM: CPT | Performed by: INTERNAL MEDICINE

## 2020-09-10 PROCEDURE — 80069 RENAL FUNCTION PANEL: CPT | Performed by: INTERNAL MEDICINE

## 2020-09-10 PROCEDURE — 84132 ASSAY OF SERUM POTASSIUM: CPT | Performed by: INTERNAL MEDICINE

## 2020-09-10 RX ORDER — FUROSEMIDE 40 MG/1
40 TABLET ORAL DAILY
Qty: 30 TABLET | Refills: 1 | Status: SHIPPED | OUTPATIENT
Start: 2020-09-11 | End: 2020-10-01

## 2020-09-10 RX ORDER — CYCLOBENZAPRINE HCL 10 MG
10 TABLET ORAL 3 TIMES DAILY PRN
Qty: 12 TABLET | Refills: 0 | Status: SHIPPED | OUTPATIENT
Start: 2020-09-10 | End: 2020-10-01

## 2020-09-10 NOTE — RESPIRATORY THERAPY NOTE
[facility] Equipment Usage Summary :            Set Mode : CPAP W FLEX          Usage in Hours:12;57          90% Pressure (EPAP) : 18           90% Insp Pressure (IPAP);           AHI : 13.3               Supplemental Oxygen LPM :5

## 2020-09-10 NOTE — DISCHARGE SUMMARY
General Medicine Discharge Summary     Patient ID:  Namrata Median  63year old  7/19/1960    Admit date: 8/30/2020    Discharge date and time: 9/10/20    Attending Physician: Brittany Villalobos DO Acute respiratory failure - resolved, on RA when awake  # Acute on chronic systolic CHF  - On room air today, using CPAP when sleeping  - Some fluid overload, less likely PNA, no marked cough/fevers  - Difficult as got fluid for hypoTN  - Urine strep/legio WOUND OSTOMY  IP CONSULT TO NEPHROLOGY  IP CONSULT TO SOCIAL WORK  IP CONSULT TO CASE MANAGEMENT  IP CONSULT TO INFECTIOUS DISEASE  IP CONSULT TO SOCIAL WORK  IP CONSULT TO VASCULAR ACCESS TEAM    Operative Procedures: Procedure(s) (LRB):  JOSE (N/A) duplicate medications found. Please discuss with provider.           Activity: activity as tolerated  Diet: cardiac diet  Wound Care: as directed  Code Status: Full Code      Exam on day of discharge:      09/10/20  1212   BP: 141/73   Pulse:    Resp: 17

## 2020-09-10 NOTE — CM/SW NOTE
09/10/20 1225   Discharge disposition   Expected discharge disposition Skilled Nurs   Name of [facility]ill[facility]Hospice   [facility]   Discharge transportation [address] informed sw that pt is ok to dc. Pt cleared for dc .   [facility]

## 2020-09-10 NOTE — CM/SW NOTE
SW spoke to RN this am.  Waiting on medical clearance for dc - [facility] will need notice of dc in order to get bariatric equipment in place. Usually takes several hours to get there.      Mick Llanes LCSW  /Discharge Planner  [phone]

## 2020-09-10 NOTE — PLAN OF CARE
9/10/2020    Pt being discharged today  Removed LDA  Removed monitor and notified tele  Gave d/c papers and instructions to EMT  Gave copy of d/c papers to family  Family at bedside  All pt belongings with family  Pt leaving via ambulance to [address]

## 2020-09-10 NOTE — PAYOR COMM NOTE
--------------  9/10 CONTINUED STAY REVIEW    Payor: [facility]  Subscriber #:  [id number]  Authorization Number: [id number]        Social Work   CM/YAZMIN Note   Signed   Date of Service:  9/10/2020 12:32 PM               Signed                  09/10/20 1225 9/10/2020 1010 Given 40 mg Oral Manan Weiss RN      gabapentin (NEURONTIN) cap 300 mg     Date Action Dose Route User    9/10/2020 1011 Given 300 mg Oral Manan Weiss RN    9/9/2020 2023 Given 300 mg Oral Celeste Orozco RN      HYDROcodone-acetaminoph

## 2020-09-10 NOTE — PLAN OF CARE
Assumed pt care at [address] [facility]. A&Ox4. RA/CPAP HS, denies chest pain/SOB, lung sounds diminished. VSS, NSR on tele. Edema to BLE/feet. Abdominal edema, abdomen firm/distended.  Blair in place-will attempt voiding trial in AM- pt refused blair removal at night, con perfusion - ex.  Angina  - Evaluate fluid balance, assess for edema, trend weights  Outcome: Progressing  Goal: Absence of cardiac arrhythmias or at baseline  Description  INTERVENTIONS:  - Continuous cardiac monitoring, monitor vital signs, obtain 12 lead weight  - Monitor urine specific gravity, serum osmolarity and serum sodium as indicated or ordered  - Monitor response to interventions for patient's volume status, including labs, urine output, blood pressure (other measures as available)  - Encourage or measures as appropriate and evaluate response  - Consider cultural and social influences on pain and pain management  - Manage/alleviate anxiety  - Utilize distraction and/or relaxation techniques  - Monitor for opioid side effects  - Notify MD/LIP if inte activity level and limitations with patient/family  Outcome: Progressing  Goal: Maintain proper alignment of affected body part  Description  INTERVENTIONS:  - Support and protect limb and body alignment per provider's orders  - Instruct and reinforce with

## 2020-09-10 NOTE — PROGRESS NOTES
[facility]    Nephrology Progress Note    Gabriella Burdick Attending:  Diane Villeda DO       SUBJECTIVE:     Overall feels well  Redness in legs is improving  No cp, sob, urinary complaints    PHYSICAL EXAM:     Vital Signs: /71 (BP Location: L 09/07/2020    MCV 91.7 09/07/2020    MCH 27.4 09/07/2020    MCHC 29.9 (L) 09/07/2020    RDW 16.2 (H) 09/07/2020    NEPRELIM 8.21 (H) 09/07/2020    NEUTABS 8.78 (H) 09/07/2020    LYMPHABS 1.87 09/07/2020    EOSABS 0.12 09/07/2020    BASABS 0.00 09/07/2020 liquid 30 g, 30 g, Oral, Q15 Min PRN    Or  Glucose-Vitamin C (DEX-4) chewable tab 8 tablet, 8 tablet, Oral, Q15 Min PRN  docusate sodium (COLACE) cap 100 mg, 100 mg, Oral, BID  bisacodyl (DULCOLAX) EC tab 5 mg, 5 mg, Oral, Daily PRN  ceFAZolin in NS (ANCE

## 2020-09-10 NOTE — PROGRESS NOTES
INFECTIOUS DISEASE PROGRESS NOTE    Stacyjeri Odomt Patient Status:  Inpatient    1960 MRN [id number]   [facility] 4SW-A Attending Florian Burkitt, MD   [facility] Day # 6 PCP Saundra Ram atorvastatin (LIPITOR) tab 20 mg, 20 mg, Oral, Nightly  •  apixaban (ELIQUIS) tab 5 mg, 5 mg, Oral, BID  •  cyclobenzaprine (FLEXERIL) tab 10 mg, 10 mg, Oral, TID PRN    Review of Systems:  Completed. See pertinent positives and negatives above.     Physica (strep sp) BSI due to above. Repeat blood cultures negative 9/1.  3. LLE cellulitis- secondary to GGS. Improving. 4. Resp failure- agree more likely due to fluid OL rather than pna. Resolved. On room air currently/CPAP nightly.    5. Leukocytosis due to ab

## 2020-09-10 NOTE — PLAN OF CARE
9/10/2020    A&Ox4  RA  CAMI- CPAP at HS  Denies any cp or sob  VSS  NSR on tele  Edema to BLE  ABD firm and distended  Doran removed- Awaiting post removal urination  Total lift  Dressings to BLE, Coccyx, R hip and R back shoulder in place     POC: ARLEY Zamora Continuous cardiac monitoring, monitor vital signs, obtain 12 lead EKG if indicated  - Evaluate effectiveness of antiarrhythmic and heart rate control medications as ordered  - Initiate emergency measures for life threatening arrhythmias  - Monitor electro output, blood pressure (other measures as available)  - Encourage oral intake as appropriate  - Instruct patient on fluid and nutrition restrictions as appropriate  Outcome: Progressing     Problem: SKIN/TISSUE INTEGRITY - ADULT  Goal: Skin integrity remai techniques  - Monitor for opioid side effects  - Notify MD/LIP if interventions unsuccessful or patient reports new pain  - Anticipate increased pain with activity and pre-medicate as appropriate  Outcome: Progressing     Problem: SAFETY ADULT - FALL  Goal body alignment per provider's orders  - Instruct and reinforce with patient and family use of appropriate assistive device and precautions (e.g. spinal or hip dislocation precautions)  Outcome: Progressing     Problem: Impaired Activities of Daily Living

## 2020-09-10 NOTE — PROGRESS NOTES
[facility]  Progress Note    Renaldo Connors Patient Status:  Inpatient    1960 MRN [id number]   [facility] 2NE-A Attending Jefry Cody, [address] [facility] Day # 11 PCP Arnoldo Clancy MD     Outpatient Cardiologist: none. 1. 8 1.8 1.7 1.8  --    PHOS 2.3* 2.6 2.6 2.7 2.8  --    * 176* 160* 170* 160*  --        Recent Labs   Lab 09/06/20  1049 09/07/20  0535 09/08/20  0712 09/09/20  0752 09/10/20  0551   ALB 2.4* 2.3* 2.3* 2.2* 2.3*       Recent Labs   Lab 09/08/20  [facility], 2:48 PM.  INDICATIONS:  swelling and cramping, rule out dvt  TECHNIQUE:  Real time, grey scale, and duplex ultrasound was used to evaluate the lower extremity venous system.  B-mode two-dimensional images of the vasc bacteremia  - 2/2 LE wounds, cellulitis. - Abx per primary service/ID  - JOSE 9/4/2020 without evidence of valvular vegetation, intracardiac masses, or thrombi.     Paroxysmal atrial flutter  - s/p JOSE guided DCCV 9/4/2020  - maintaining NSR  - continue met

## 2020-09-10 NOTE — CM/SW NOTE
Sw updated wife of possible dc for today. Sw spoke to Girish from [facility]- she is aware of possible dc- once dc confirmed she will order pt's bariatric equipment.     Neha Cervantes LCSW  /Discharge Planner  [phone]

## 2020-09-11 ENCOUNTER — APPOINTMENT (OUTPATIENT)
Dept: WOUND CARE | Facility: HOSPITAL | Age: 60
End: 2020-09-11
Attending: INTERNAL MEDICINE
Payer: COMMERCIAL

## 2020-09-11 NOTE — PLAN OF CARE
9/10/2020    [facility] arrived with what the company considers a bariatric stretcher. The stretcher is designed to hold up to 700lbs but not designed to transport girth safely.    SW specifically chose [facility] because they had a specialized bariat

## 2020-10-05 PROBLEM — R60.0 BILATERAL LOWER EXTREMITY EDEMA: Status: ACTIVE | Noted: 2020-10-05

## 2020-10-05 PROBLEM — D64.9 ANEMIA, UNSPECIFIED TYPE: Status: ACTIVE | Noted: 2020-10-05

## 2020-10-29 PROBLEM — I95.9 HYPOTENSION, UNSPECIFIED HYPOTENSION TYPE: Status: RESOLVED | Noted: 2020-08-30 | Resolved: 2020-10-29

## 2020-10-29 PROBLEM — N17.9 ACUTE KIDNEY INJURY (HCC): Status: RESOLVED | Noted: 2020-08-30 | Resolved: 2020-10-29

## 2020-10-29 PROBLEM — R00.0 TACHYCARDIA: Status: RESOLVED | Noted: 2020-08-30 | Resolved: 2020-10-29

## 2020-10-29 PROBLEM — E87.5 HYPERKALEMIA: Status: RESOLVED | Noted: 2020-08-30 | Resolved: 2020-10-29

## 2020-11-30 ENCOUNTER — LAB REQUISITION (OUTPATIENT)
Dept: LAB | Facility: HOSPITAL | Age: 60
End: 2020-11-30
Payer: COMMERCIAL

## 2020-11-30 DIAGNOSIS — I10 ESSENTIAL (PRIMARY) HYPERTENSION: ICD-10-CM

## 2020-11-30 PROCEDURE — 80048 BASIC METABOLIC PNL TOTAL CA: CPT | Performed by: FAMILY MEDICINE

## 2020-12-08 ENCOUNTER — LAB REQUISITION (OUTPATIENT)
Dept: LAB | Facility: HOSPITAL | Age: 60
End: 2020-12-08
Payer: COMMERCIAL

## 2020-12-08 DIAGNOSIS — E11.59 TYPE 2 DIABETES MELLITUS WITH OTHER CIRCULATORY COMPLICATIONS (HCC): ICD-10-CM

## 2020-12-08 PROCEDURE — 80048 BASIC METABOLIC PNL TOTAL CA: CPT | Performed by: FAMILY MEDICINE

## 2020-12-10 ENCOUNTER — LAB REQUISITION (OUTPATIENT)
Dept: LAB | Facility: HOSPITAL | Age: 60
End: 2020-12-10
Payer: COMMERCIAL

## 2020-12-10 DIAGNOSIS — E87.5 HYPERKALEMIA: ICD-10-CM

## 2020-12-10 PROCEDURE — 80048 BASIC METABOLIC PNL TOTAL CA: CPT | Performed by: FAMILY MEDICINE
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
## 2021-04-06 PROBLEM — I48.91 ATRIAL FIBRILLATION, UNSPECIFIED TYPE (HCC): Status: ACTIVE | Noted: 2021-04-06

## 2021-07-09 ENCOUNTER — APPOINTMENT (OUTPATIENT)
Dept: GENERAL RADIOLOGY | Facility: HOSPITAL | Age: 61
End: 2021-07-09
Attending: EMERGENCY MEDICINE
Payer: COMMERCIAL

## 2021-07-09 ENCOUNTER — HOSPITAL ENCOUNTER (EMERGENCY)
Facility: HOSPITAL | Age: 61
Discharge: HOME | End: 2021-07-09
Attending: EMERGENCY MEDICINE
Payer: COMMERCIAL

## 2021-07-09 VITALS
BODY MASS INDEX: 44.1 KG/M2 | DIASTOLIC BLOOD PRESSURE: 68 MMHG | HEART RATE: 73 BPM | RESPIRATION RATE: 21 BRPM | WEIGHT: 315 LBS | TEMPERATURE: 99 F | SYSTOLIC BLOOD PRESSURE: 130 MMHG | HEIGHT: 71 IN | OXYGEN SATURATION: 100 %

## 2021-07-09 DIAGNOSIS — L03.115 CELLULITIS OF RIGHT LEG: Primary | ICD-10-CM

## 2021-07-09 LAB
ALBUMIN SERPL-MCNC: 3.7 G/DL (ref 3.4–5)
ALBUMIN/GLOB SERPL: 0.9 {RATIO} (ref 1–2)
ALP LIVER SERPL-CCNC: 90 U/L
ALT SERPL-CCNC: 18 U/L
ANION GAP SERPL CALC-SCNC: 4 MMOL/L (ref 0–18)
APTT PPP: 41.8 SECONDS (ref 25.4–36.1)
AST SERPL-CCNC: 11 U/L (ref 15–37)
BASOPHILS # BLD AUTO: 0.07 X10(3) UL (ref 0–0.2)
BASOPHILS NFR BLD AUTO: 0.6 %
BILIRUB SERPL-MCNC: 0.3 MG/DL (ref 0.1–2)
BILIRUB UR QL STRIP.AUTO: NEGATIVE
BUN BLD-MCNC: 40 MG/DL (ref 7–18)
BUN/CREAT SERPL: 24.4 (ref 10–20)
CALCIUM BLD-MCNC: 9 MG/DL (ref 8.5–10.1)
CHLORIDE SERPL-SCNC: 105 MMOL/L (ref 98–112)
CO2 SERPL-SCNC: 30 MMOL/L (ref 21–32)
COLOR UR AUTO: YELLOW
CREAT BLD-MCNC: 1.64 MG/DL
DEPRECATED RDW RBC AUTO: 39.9 FL (ref 35.1–46.3)
EOSINOPHIL # BLD AUTO: 0.22 X10(3) UL (ref 0–0.7)
EOSINOPHIL NFR BLD AUTO: 1.9 %
ERYTHROCYTE [DISTWIDTH] IN BLOOD BY AUTOMATED COUNT: 12.2 % (ref 11–15)
GLOBULIN PLAS-MCNC: 4.2 G/DL (ref 2.8–4.4)
GLUCOSE BLD-MCNC: 198 MG/DL (ref 70–99)
GLUCOSE UR STRIP.AUTO-MCNC: 50 MG/DL
HCT VFR BLD AUTO: 38 %
HGB BLD-MCNC: 12.4 G/DL
HYALINE CASTS #/AREA URNS AUTO: PRESENT /LPF
IMM GRANULOCYTES # BLD AUTO: 0.04 X10(3) UL (ref 0–1)
IMM GRANULOCYTES NFR BLD: 0.3 %
INR BLD: 1.34 (ref 0.89–1.11)
KETONES UR STRIP.AUTO-MCNC: NEGATIVE MG/DL
LACTATE SERPL-SCNC: 2.1 MMOL/L (ref 0.4–2)
LEUKOCYTE ESTERASE UR QL STRIP.AUTO: NEGATIVE
LYMPHOCYTES # BLD AUTO: 3.26 X10(3) UL (ref 1–4)
LYMPHOCYTES NFR BLD AUTO: 27.7 %
M PROTEIN MFR SERPL ELPH: 7.9 G/DL (ref 6.4–8.2)
MCH RBC QN AUTO: 29.5 PG (ref 26–34)
MCHC RBC AUTO-ENTMCNC: 32.6 G/DL (ref 31–37)
MCV RBC AUTO: 90.5 FL
MONOCYTES # BLD AUTO: 0.76 X10(3) UL (ref 0.1–1)
MONOCYTES NFR BLD AUTO: 6.5 %
NEUTROPHILS # BLD AUTO: 7.43 X10 (3) UL (ref 1.5–7.7)
NEUTROPHILS # BLD AUTO: 7.43 X10(3) UL (ref 1.5–7.7)
NEUTROPHILS NFR BLD AUTO: 63 %
NITRITE UR QL STRIP.AUTO: NEGATIVE
NT-PROBNP SERPL-MCNC: 407 PG/ML (ref ?–125)
OSMOLALITY SERPL CALC.SUM OF ELEC: 303 MOSM/KG (ref 275–295)
PH UR STRIP.AUTO: 6 [PH] (ref 5–8)
PLATELET # BLD AUTO: 286 10(3)UL (ref 150–450)
POTASSIUM SERPL-SCNC: 4.8 MMOL/L (ref 3.5–5.1)
PROCALCITONIN SERPL-MCNC: <0.05 NG/ML (ref ?–0.16)
PROT UR STRIP.AUTO-MCNC: 100 MG/DL
PSA SERPL DL<=0.01 NG/ML-MCNC: 17 SECONDS (ref 12.4–14.6)
RBC # BLD AUTO: 4.2 X10(6)UL
RBC UR QL AUTO: NEGATIVE
SODIUM SERPL-SCNC: 139 MMOL/L (ref 136–145)
SP GR UR STRIP.AUTO: 1.01 (ref 1–1.03)
UROBILINOGEN UR STRIP.AUTO-MCNC: <2 MG/DL
WBC # BLD AUTO: 11.8 X10(3) UL (ref 4–11)

## 2021-07-09 PROCEDURE — 99285 EMERGENCY DEPT VISIT HI MDM: CPT

## 2021-07-09 PROCEDURE — 84145 PROCALCITONIN (PCT): CPT | Performed by: EMERGENCY MEDICINE

## 2021-07-09 PROCEDURE — 93010 ELECTROCARDIOGRAM REPORT: CPT

## 2021-07-09 PROCEDURE — 85730 THROMBOPLASTIN TIME PARTIAL: CPT | Performed by: EMERGENCY MEDICINE

## 2021-07-09 PROCEDURE — 85025 COMPLETE CBC W/AUTO DIFF WBC: CPT | Performed by: EMERGENCY MEDICINE

## 2021-07-09 PROCEDURE — 36415 COLL VENOUS BLD VENIPUNCTURE: CPT

## 2021-07-09 PROCEDURE — 81001 URINALYSIS AUTO W/SCOPE: CPT | Performed by: EMERGENCY MEDICINE

## 2021-07-09 PROCEDURE — 83605 ASSAY OF LACTIC ACID: CPT | Performed by: EMERGENCY MEDICINE

## 2021-07-09 PROCEDURE — 80053 COMPREHEN METABOLIC PANEL: CPT | Performed by: EMERGENCY MEDICINE

## 2021-07-09 PROCEDURE — 85610 PROTHROMBIN TIME: CPT | Performed by: EMERGENCY MEDICINE

## 2021-07-09 PROCEDURE — 85610 PROTHROMBIN TIME: CPT

## 2021-07-09 PROCEDURE — 71045 X-RAY EXAM CHEST 1 VIEW: CPT | Performed by: EMERGENCY MEDICINE

## 2021-07-09 PROCEDURE — 87086 URINE CULTURE/COLONY COUNT: CPT | Performed by: EMERGENCY MEDICINE

## 2021-07-09 PROCEDURE — 87040 BLOOD CULTURE FOR BACTERIA: CPT | Performed by: EMERGENCY MEDICINE

## 2021-07-09 PROCEDURE — 99284 EMERGENCY DEPT VISIT MOD MDM: CPT

## 2021-07-09 PROCEDURE — 83880 ASSAY OF NATRIURETIC PEPTIDE: CPT | Performed by: EMERGENCY MEDICINE

## 2021-07-09 PROCEDURE — 93005 ELECTROCARDIOGRAM TRACING: CPT

## 2021-07-09 PROCEDURE — 85730 THROMBOPLASTIN TIME PARTIAL: CPT

## 2021-07-09 RX ORDER — CEFADROXIL 500 MG/1
500 CAPSULE ORAL 2 TIMES DAILY
Qty: 20 CAPSULE | Refills: 0 | Status: SHIPPED | OUTPATIENT
Start: 2021-07-09 | End: 2021-07-19

## 2021-07-09 NOTE — ED PROVIDER NOTES
Patient Seen in: [facility] Emergency Department      History   Patient presents with:  Rash Skin Problem    Stated Complaint: pt called ems for increase in swelling/edema/weeping x 5 days at [facility]ila lower legs    HPI/Subjective:   HPI    This is a 60- Osteoarthritis    • Osteoarthritis, localized, knee 10/26/2011    left   • Renal insufficiency, mild July 2010    Was hospitalized due to dehydration and had acute renal insufficiency (creatinine 1.85)   • Scalp laceration Dx in 3/2012    He hit his head a No guarding. EXTREMITIES: Bilateral lower extremity edema. Chronic venous stasis ulcers. There are some areas that are scabbed over with surrounding erythema. +2/4 DP pulses noted.                         ED Course     Labs Reviewed   COMP METABOLIC PA With Differential With Platelet.   Procedure                               Abnormality         Status                     ---------                               -----------         ------                     CBC W/ DIFFERENTIAL[[id number]]          Abnormal Disposition and Plan     Clinical Impression:  Cellulitis of right leg  (primary encounter diagnosis)     Disposition:  Discharge  7/9/2021  8:54 pm    Follow-up:  Martine Jenkins, [address] [facility],[place] [address]  [address] [facility]  [phone]    On 7/12/20

## 2021-07-10 LAB
ATRIAL RATE: 74 BPM
P AXIS: 47 DEGREES
P-R INTERVAL: 132 MS
Q-T INTERVAL: 418 MS
QRS DURATION: 130 MS
QTC CALCULATION (BEZET): 463 MS
R AXIS: 10 DEGREES
T AXIS: 39 DEGREES
VENTRICULAR RATE: 74 BPM

## 2021-07-23 ENCOUNTER — HOSPITAL ENCOUNTER (EMERGENCY)
Facility: HOSPITAL | Age: 61
Discharge: HOME | End: 2021-07-23
Attending: EMERGENCY MEDICINE
Payer: COMMERCIAL

## 2021-07-23 VITALS
BODY MASS INDEX: 44.1 KG/M2 | HEART RATE: 67 BPM | TEMPERATURE: 98 F | HEIGHT: 71 IN | RESPIRATION RATE: 16 BRPM | SYSTOLIC BLOOD PRESSURE: 130 MMHG | DIASTOLIC BLOOD PRESSURE: 73 MMHG | OXYGEN SATURATION: 97 % | WEIGHT: 315 LBS

## 2021-07-23 DIAGNOSIS — E87.5 HYPERKALEMIA: Primary | ICD-10-CM

## 2021-07-23 LAB
ALBUMIN SERPL-MCNC: 3.7 G/DL (ref 3.4–5)
ALBUMIN/GLOB SERPL: 0.9 {RATIO} (ref 1–2)
ALP LIVER SERPL-CCNC: 108 U/L
ALT SERPL-CCNC: 27 U/L
ANION GAP SERPL CALC-SCNC: 1 MMOL/L (ref 0–18)
AST SERPL-CCNC: 18 U/L (ref 15–37)
ATRIAL RATE: 66 BPM
ATRIAL RATE: 71 BPM
BASOPHILS # BLD AUTO: 0.06 X10(3) UL (ref 0–0.2)
BASOPHILS NFR BLD AUTO: 0.5 %
BILIRUB SERPL-MCNC: 0.4 MG/DL (ref 0.1–2)
BUN BLD-MCNC: 31 MG/DL (ref 7–18)
BUN/CREAT SERPL: 24.4 (ref 10–20)
CALCIUM BLD-MCNC: 9.5 MG/DL (ref 8.5–10.1)
CHLORIDE SERPL-SCNC: 105 MMOL/L (ref 98–112)
CO2 SERPL-SCNC: 29 MMOL/L (ref 21–32)
CREAT BLD-MCNC: 1.27 MG/DL
DEPRECATED RDW RBC AUTO: 38.7 FL (ref 35.1–46.3)
EOSINOPHIL # BLD AUTO: 0.24 X10(3) UL (ref 0–0.7)
EOSINOPHIL NFR BLD AUTO: 2.1 %
ERYTHROCYTE [DISTWIDTH] IN BLOOD BY AUTOMATED COUNT: 12.1 % (ref 11–15)
GLOBULIN PLAS-MCNC: 4.2 G/DL (ref 2.8–4.4)
GLUCOSE BLD-MCNC: 236 MG/DL (ref 70–99)
HCT VFR BLD AUTO: 38.5 %
HGB BLD-MCNC: 12.8 G/DL
IMM GRANULOCYTES # BLD AUTO: 0.05 X10(3) UL (ref 0–1)
IMM GRANULOCYTES NFR BLD: 0.4 %
LYMPHOCYTES # BLD AUTO: 2.7 X10(3) UL (ref 1–4)
LYMPHOCYTES NFR BLD AUTO: 23.9 %
M PROTEIN MFR SERPL ELPH: 7.9 G/DL (ref 6.4–8.2)
MCH RBC QN AUTO: 29.5 PG (ref 26–34)
MCHC RBC AUTO-ENTMCNC: 33.2 G/DL (ref 31–37)
MCV RBC AUTO: 88.7 FL
MONOCYTES # BLD AUTO: 0.47 X10(3) UL (ref 0.1–1)
MONOCYTES NFR BLD AUTO: 4.2 %
NEUTROPHILS # BLD AUTO: 7.76 X10 (3) UL (ref 1.5–7.7)
NEUTROPHILS # BLD AUTO: 7.76 X10(3) UL (ref 1.5–7.7)
NEUTROPHILS NFR BLD AUTO: 68.9 %
OSMOLALITY SERPL CALC.SUM OF ELEC: 294 MOSM/KG (ref 275–295)
P AXIS: 36 DEGREES
P AXIS: 61 DEGREES
P-R INTERVAL: 140 MS
P-R INTERVAL: 186 MS
PLATELET # BLD AUTO: 272 10(3)UL (ref 150–450)
POTASSIUM SERPL-SCNC: 5.3 MMOL/L (ref 3.5–5.1)
Q-T INTERVAL: 370 MS
Q-T INTERVAL: 420 MS
QRS DURATION: 128 MS
QRS DURATION: 68 MS
QTC CALCULATION (BEZET): 402 MS
QTC CALCULATION (BEZET): 440 MS
R AXIS: -10 DEGREES
R AXIS: 10 DEGREES
RBC # BLD AUTO: 4.34 X10(6)UL
SODIUM SERPL-SCNC: 135 MMOL/L (ref 136–145)
T AXIS: 18 DEGREES
T AXIS: 19 DEGREES
VENTRICULAR RATE: 66 BPM
VENTRICULAR RATE: 71 BPM
WBC # BLD AUTO: 11.3 X10(3) UL (ref 4–11)

## 2021-07-23 PROCEDURE — 93010 ELECTROCARDIOGRAM REPORT: CPT

## 2021-07-23 PROCEDURE — 93005 ELECTROCARDIOGRAM TRACING: CPT

## 2021-07-23 PROCEDURE — 99284 EMERGENCY DEPT VISIT MOD MDM: CPT

## 2021-07-23 PROCEDURE — 80053 COMPREHEN METABOLIC PANEL: CPT

## 2021-07-23 PROCEDURE — 36415 COLL VENOUS BLD VENIPUNCTURE: CPT

## 2021-07-23 PROCEDURE — 80053 COMPREHEN METABOLIC PANEL: CPT | Performed by: EMERGENCY MEDICINE

## 2021-07-23 PROCEDURE — 85025 COMPLETE CBC W/AUTO DIFF WBC: CPT | Performed by: EMERGENCY MEDICINE

## 2021-07-23 PROCEDURE — 85025 COMPLETE CBC W/AUTO DIFF WBC: CPT

## 2021-07-23 PROCEDURE — 99285 EMERGENCY DEPT VISIT HI MDM: CPT

## 2021-07-23 RX ORDER — TORSEMIDE 5 MG/1
10 TABLET ORAL DAILY
Status: DISCONTINUED | OUTPATIENT
Start: 2021-07-23 | End: 2021-07-23

## 2021-07-23 NOTE — ED PROVIDER NOTES
Patient Seen in: [facility] Emergency Department      History   Patient presents with:  Abnormal Labs    Stated Complaint: High K    HPI/Subjective:   HPI    60-year-old white male presents emergent today for complaint of high potassium.   Patient st Past Surgical History:   Procedure Laterality Date   • OTHER SURGICAL HISTORY      Root canals                Social History    Tobacco Use      Smoking status: Never Smoker      Smokeless tobacco: Never Used    Vaping Use      Vaping Use: Nev 11.3 (*)     HGB 12.8 (*)     HCT 38.5 (*)     Neutrophil Absolute Prelim 7.76 (*)     Neutrophil Absolute 7.76 (*)     All other components within normal limits   CBC WITH DIFFERENTIAL WITH PLATELET    Narrative:      The following orders were created for

## 2021-07-23 NOTE — CONSULTS
[address] [facility] - Nephrology  Inpatient Consultation    Meir Sosa Patient Status:  Emergency    1960 MRN [id number]   Location [address] [facility] Attending Jimbo Kerns MD   Hosp Day # 0 PCP Hany Fisher bilat-received Synvisc-One injections to both knees   • High cholesterol    • Hypertension    • Lipid screening 2/23/2011   • Mixed hyperlipidemia    • Morbid obesity [facility])    • Osteoarthritis    • Osteoarthritis, localized, knee 10/26/2011    left   • Braeden Neurological: Negative. All other systems reviewed and are negative.       PHYSICAL EXAM:     Vital Signs: /71   Pulse 66   Temp 98.2 °F (36.8 °C) (Temporal)   Resp 17   Ht 5' 11\" (1.803 m)   Wt (!) 367 lb (166.5 kg)   SpO2 100%   BMI 51.19 kg/m TP 7.9 07/23/2021    ALB 3.7 07/23/2021    GLOBULT 3.0 12/12/2015    GLOBULIN 4.2 07/23/2021    ALBGLOBRAT 1.4 12/12/2015     (L) 07/23/2021    K 5.3 (H) 07/23/2021     07/23/2021    CO2 29.0 07/23/2021     Lab Results   Component Value Date swelling/edema/weeping x 5 days at bila lower legs       PATIENT STATED HISTORY: (As transcribed by Technologist)  Patient offered no additional history at this time            FINDINGS:  The lungs are clear.  Cardiomediastinal silhouette and vascularity a

## 2021-07-24 PROBLEM — E10.59 TYPE 1 DIABETES MELLITUS WITH OTHER CIRCULATORY COMPLICATION (HCC): Status: ACTIVE | Noted: 2021-07-24

## 2021-07-24 PROBLEM — B96.89 BACTERIAL SKIN INFECTION: Status: ACTIVE | Noted: 2021-07-24

## 2021-07-24 PROBLEM — L08.9 BACTERIAL SKIN INFECTION: Status: ACTIVE | Noted: 2021-07-24

## 2021-08-11 ENCOUNTER — LAB ENCOUNTER (OUTPATIENT)
Dept: LAB | Facility: HOSPITAL | Age: 61
End: 2021-08-11
Attending: INTERNAL MEDICINE
Payer: COMMERCIAL

## 2021-08-11 DIAGNOSIS — E87.5 HYPERKALEMIA: ICD-10-CM

## 2021-08-11 LAB — POTASSIUM SERPL-SCNC: 5.4 MMOL/L (ref 3.5–5.1)

## 2021-08-11 PROCEDURE — 84132 ASSAY OF SERUM POTASSIUM: CPT

## 2021-08-11 PROCEDURE — 36415 COLL VENOUS BLD VENIPUNCTURE: CPT

## 2021-08-13 NOTE — PROGRESS NOTES
Spoke to patient. Patient knows to increase Lokelma to 15g/day. Patient states his 5g script will be ready today or tomorrow per the pharmacy. Advised to repeat labs on Monday. He v/u.

## 2021-08-13 NOTE — PROGRESS NOTES
Great thanks. Increase the the 15g lokelma as Dr [address] mentioned.  I do not see pended Rx , if you can please resend  Ensure repeat K Monday under Dr Desiree Ocampo

## 2021-08-13 NOTE — PROGRESS NOTES
Patient states that he is not having any swelling and that his blood pressures and everything is fine.

## 2021-08-16 ENCOUNTER — LAB ENCOUNTER (OUTPATIENT)
Dept: LAB | Facility: HOSPITAL | Age: 61
End: 2021-08-16
Attending: INTERNAL MEDICINE
Payer: COMMERCIAL

## 2021-08-16 DIAGNOSIS — E87.5 HYPERKALEMIA: ICD-10-CM

## 2021-08-16 LAB — POTASSIUM SERPL-SCNC: 5.4 MMOL/L (ref 3.5–5.1)

## 2021-08-16 PROCEDURE — 36415 COLL VENOUS BLD VENIPUNCTURE: CPT

## 2021-08-16 PROCEDURE — 84132 ASSAY OF SERUM POTASSIUM: CPT

## 2021-08-20 ENCOUNTER — LAB ENCOUNTER (OUTPATIENT)
Dept: LAB | Facility: HOSPITAL | Age: 61
End: 2021-08-20
Attending: INTERNAL MEDICINE
Payer: COMMERCIAL

## 2021-08-20 DIAGNOSIS — E87.5 HYPERKALEMIA: ICD-10-CM

## 2021-08-20 LAB
ALBUMIN SERPL-MCNC: 3.6 G/DL (ref 3.4–5)
ANION GAP SERPL CALC-SCNC: 3 MMOL/L (ref 0–18)
BUN BLD-MCNC: 25 MG/DL (ref 7–18)
CALCIUM BLD-MCNC: 9.5 MG/DL (ref 8.5–10.1)
CHLORIDE SERPL-SCNC: 105 MMOL/L (ref 98–112)
CO2 SERPL-SCNC: 32 MMOL/L (ref 21–32)
CREAT BLD-MCNC: 1.22 MG/DL
GLUCOSE BLD-MCNC: 218 MG/DL (ref 70–99)
OSMOLALITY SERPL CALC.SUM OF ELEC: 301 MOSM/KG (ref 275–295)
PHOSPHATE SERPL-MCNC: 2.6 MG/DL (ref 2.5–4.9)
POTASSIUM SERPL-SCNC: 5.1 MMOL/L (ref 3.5–5.1)
SODIUM SERPL-SCNC: 140 MMOL/L (ref 136–145)

## 2021-08-20 PROCEDURE — 36415 COLL VENOUS BLD VENIPUNCTURE: CPT

## 2021-08-20 PROCEDURE — 80069 RENAL FUNCTION PANEL: CPT

## 2021-09-02 ENCOUNTER — LAB ENCOUNTER (OUTPATIENT)
Dept: LAB | Facility: HOSPITAL | Age: 61
End: 2021-09-02
Attending: INTERNAL MEDICINE
Payer: COMMERCIAL

## 2021-09-02 DIAGNOSIS — E87.5 HYPERKALEMIA: ICD-10-CM

## 2021-09-02 LAB
ALBUMIN SERPL-MCNC: 3.5 G/DL (ref 3.4–5)
ANION GAP SERPL CALC-SCNC: 7 MMOL/L (ref 0–18)
BUN BLD-MCNC: 24 MG/DL (ref 7–18)
CALCIUM BLD-MCNC: 9.4 MG/DL (ref 8.5–10.1)
CHLORIDE SERPL-SCNC: 105 MMOL/L (ref 98–112)
CO2 SERPL-SCNC: 29 MMOL/L (ref 21–32)
CREAT BLD-MCNC: 1.06 MG/DL
GLUCOSE BLD-MCNC: 86 MG/DL (ref 70–99)
OSMOLALITY SERPL CALC.SUM OF ELEC: 295 MOSM/KG (ref 275–295)
PHOSPHATE SERPL-MCNC: 3 MG/DL (ref 2.5–4.9)
POTASSIUM SERPL-SCNC: 4.8 MMOL/L (ref 3.5–5.1)
SODIUM SERPL-SCNC: 141 MMOL/L (ref 136–145)

## 2021-09-02 PROCEDURE — 36415 COLL VENOUS BLD VENIPUNCTURE: CPT

## 2021-09-02 PROCEDURE — 80069 RENAL FUNCTION PANEL: CPT

## 2021-09-02 NOTE — PROGRESS NOTES
Potassium improved and now normal   Please order repeat RFP in 2 weeks under Dr Naa Bang )(dx hyperkalemia)

## 2021-10-07 ENCOUNTER — LAB ENCOUNTER (OUTPATIENT)
Dept: LAB | Facility: HOSPITAL | Age: 61
End: 2021-10-07
Attending: INTERNAL MEDICINE
Payer: COMMERCIAL

## 2021-10-07 DIAGNOSIS — E87.5 HYPERKALEMIA: ICD-10-CM

## 2021-10-07 PROCEDURE — 36415 COLL VENOUS BLD VENIPUNCTURE: CPT

## 2021-10-07 PROCEDURE — 80069 RENAL FUNCTION PANEL: CPT

## 2021-10-15 ENCOUNTER — LAB ENCOUNTER (OUTPATIENT)
Dept: LAB | Facility: HOSPITAL | Age: 61
End: 2021-10-15
Attending: INTERNAL MEDICINE
Payer: COMMERCIAL

## 2021-10-15 DIAGNOSIS — E87.5 HYPERKALEMIA: ICD-10-CM

## 2021-10-15 PROCEDURE — 36415 COLL VENOUS BLD VENIPUNCTURE: CPT

## 2021-10-15 PROCEDURE — 80048 BASIC METABOLIC PNL TOTAL CA: CPT

## 2021-10-24 PROBLEM — E11.65 UNCONTROLLED TYPE 2 DIABETES MELLITUS WITH HYPERGLYCEMIA (HCC): Status: ACTIVE | Noted: 2021-10-24

## 2021-12-16 PROBLEM — N18.2 CKD (CHRONIC KIDNEY DISEASE), STAGE II: Status: ACTIVE | Noted: 2021-12-16

## 2021-12-16 PROBLEM — I89.0 LYMPHEDEMA: Status: ACTIVE | Noted: 2021-12-16

## 2022-03-02 PROBLEM — M17.0 OSTEOARTHRITIS OF BOTH KNEES, UNSPECIFIED OSTEOARTHRITIS TYPE: Status: ACTIVE | Noted: 2022-03-02

## 2022-03-15 PROBLEM — I48.92 ATRIAL FLUTTER (HCC): Status: RESOLVED | Noted: 2022-03-15 | Resolved: 2022-03-15

## 2022-03-15 PROBLEM — I48.92 ATRIAL FLUTTER (HCC): Status: ACTIVE | Noted: 2022-03-15

## 2022-03-15 PROBLEM — I48.92 PAROXYSMAL ATRIAL FLUTTER (HCC): Status: ACTIVE | Noted: 2022-03-15

## 2022-03-15 PROBLEM — I50.22 CHRONIC HFREF (HEART FAILURE WITH REDUCED EJECTION FRACTION) (HCC): Status: ACTIVE | Noted: 2022-03-15

## 2022-06-03 ENCOUNTER — LAB ENCOUNTER (OUTPATIENT)
Dept: LAB | Facility: HOSPITAL | Age: 62
End: 2022-06-03
Attending: INTERNAL MEDICINE
Payer: COMMERCIAL

## 2022-06-03 DIAGNOSIS — N18.31 STAGE 3A CHRONIC KIDNEY DISEASE (HCC): ICD-10-CM

## 2022-06-03 LAB
ALBUMIN SERPL-MCNC: 3.5 G/DL (ref 3.4–5)
ANION GAP SERPL CALC-SCNC: 7 MMOL/L (ref 0–18)
BUN BLD-MCNC: 25 MG/DL (ref 7–18)
CALCIUM BLD-MCNC: 9.3 MG/DL (ref 8.5–10.1)
CHLORIDE SERPL-SCNC: 106 MMOL/L (ref 98–112)
CO2 SERPL-SCNC: 28 MMOL/L (ref 21–32)
CREAT BLD-MCNC: 1.25 MG/DL
GLUCOSE BLD-MCNC: 121 MG/DL (ref 70–99)
OSMOLALITY SERPL CALC.SUM OF ELEC: 298 MOSM/KG (ref 275–295)
PHOSPHATE SERPL-MCNC: 3.9 MG/DL (ref 2.5–4.9)
POTASSIUM SERPL-SCNC: 4 MMOL/L (ref 3.5–5.1)
SODIUM SERPL-SCNC: 141 MMOL/L (ref 136–145)

## 2022-06-03 PROCEDURE — 80069 RENAL FUNCTION PANEL: CPT

## 2022-06-03 PROCEDURE — 36415 COLL VENOUS BLD VENIPUNCTURE: CPT

## 2023-07-13 ENCOUNTER — HOSPITAL ENCOUNTER (EMERGENCY)
Facility: HOSPITAL | Age: 63
Discharge: HOME | End: 2023-07-13
Attending: EMERGENCY MEDICINE
Payer: COMMERCIAL

## 2023-07-13 VITALS
TEMPERATURE: 97 F | HEART RATE: 68 BPM | DIASTOLIC BLOOD PRESSURE: 64 MMHG | RESPIRATION RATE: 18 BRPM | OXYGEN SATURATION: 95 % | HEIGHT: 71.65 IN | WEIGHT: 250 LBS | BODY MASS INDEX: 34.23 KG/M2 | SYSTOLIC BLOOD PRESSURE: 120 MMHG

## 2023-07-13 DIAGNOSIS — N28.9 RENAL INSUFFICIENCY: Primary | ICD-10-CM

## 2023-07-13 LAB
ANION GAP SERPL CALC-SCNC: 6 MMOL/L (ref 0–18)
ATRIAL RATE: 77 BPM
BUN BLD-MCNC: 37 MG/DL (ref 7–18)
CALCIUM BLD-MCNC: 9.3 MG/DL (ref 8.5–10.1)
CHLORIDE SERPL-SCNC: 107 MMOL/L (ref 98–112)
CO2 SERPL-SCNC: 27 MMOL/L (ref 21–32)
CREAT BLD-MCNC: 1.61 MG/DL
GFR SERPLBLD BASED ON 1.73 SQ M-ARVRAT: 48 ML/MIN/1.73M2 (ref 60–?)
GLUCOSE BLD-MCNC: 229 MG/DL (ref 70–99)
OSMOLALITY SERPL CALC.SUM OF ELEC: 306 MOSM/KG (ref 275–295)
P AXIS: 52 DEGREES
P-R INTERVAL: 130 MS
POTASSIUM SERPL-SCNC: 4.2 MMOL/L (ref 3.5–5.1)
Q-T INTERVAL: 422 MS
QRS DURATION: 126 MS
QTC CALCULATION (BEZET): 477 MS
R AXIS: 45 DEGREES
SODIUM SERPL-SCNC: 140 MMOL/L (ref 136–145)
T AXIS: 60 DEGREES
VENTRICULAR RATE: 77 BPM

## 2023-07-13 PROCEDURE — 80048 BASIC METABOLIC PNL TOTAL CA: CPT | Performed by: EMERGENCY MEDICINE

## 2023-07-13 PROCEDURE — 99283 EMERGENCY DEPT VISIT LOW MDM: CPT

## 2023-07-13 PROCEDURE — 99284 EMERGENCY DEPT VISIT MOD MDM: CPT

## 2023-07-13 PROCEDURE — 93010 ELECTROCARDIOGRAM REPORT: CPT

## 2023-07-13 PROCEDURE — 93005 ELECTROCARDIOGRAM TRACING: CPT

## 2023-07-13 PROCEDURE — 36415 COLL VENOUS BLD VENIPUNCTURE: CPT

## 2023-07-13 NOTE — ED INITIAL ASSESSMENT (HPI)
Pt had outpatient labs drawn 2 days ago. Pt was told to go the ER for elevated potassium level and creatinine Pt denies CP, SOB, abdominal pain, N/V/D.

## 2023-08-30 ENCOUNTER — LAB ENCOUNTER (OUTPATIENT)
Dept: LAB | Facility: HOSPITAL | Age: 63
End: 2023-08-30
Attending: INTERNAL MEDICINE
Payer: COMMERCIAL

## 2023-08-30 DIAGNOSIS — M89.9 BONE DISEASE: ICD-10-CM

## 2023-08-30 DIAGNOSIS — I15.2 OBESITY, DIABETES, AND HYPERTENSION SYNDROME: ICD-10-CM

## 2023-08-30 DIAGNOSIS — R80.9 PROTEINURIA: ICD-10-CM

## 2023-08-30 DIAGNOSIS — E83.9 DISORDER OF MINERAL METABOLISM: ICD-10-CM

## 2023-08-30 DIAGNOSIS — N18.30 CHRONIC KIDNEY DISEASE, STAGE III (MODERATE) (HCC): ICD-10-CM

## 2023-08-30 DIAGNOSIS — D63.1 ANEMIA OF CHRONIC RENAL FAILURE: ICD-10-CM

## 2023-08-30 DIAGNOSIS — E66.9 OBESITY, DIABETES, AND HYPERTENSION SYNDROME: ICD-10-CM

## 2023-08-30 DIAGNOSIS — N18.9 ANEMIA OF CHRONIC RENAL FAILURE: ICD-10-CM

## 2023-08-30 DIAGNOSIS — E11.59 OBESITY, DIABETES, AND HYPERTENSION SYNDROME: ICD-10-CM

## 2023-08-30 DIAGNOSIS — I15.2 ENDOCRINE HYPERTENSION: ICD-10-CM

## 2023-08-30 DIAGNOSIS — E11.69 OBESITY, DIABETES, AND HYPERTENSION SYNDROME: ICD-10-CM

## 2023-08-30 DIAGNOSIS — N25.81 SECONDARY HYPERPARATHYROIDISM OF RENAL ORIGIN (HCC): ICD-10-CM

## 2023-08-30 DIAGNOSIS — R79.89 HYPOURICEMIA: ICD-10-CM

## 2023-08-30 DIAGNOSIS — E78.5 HYPERLIPIDEMIA: Primary | ICD-10-CM

## 2023-08-30 LAB
ALBUMIN SERPL-MCNC: 3.4 G/DL (ref 3.4–5)
ALBUMIN SERPL-MCNC: 3.4 G/DL (ref 3.4–5)
ALP LIVER SERPL-CCNC: 91 U/L
ALT SERPL-CCNC: 25 U/L
ANION GAP SERPL CALC-SCNC: 3 MMOL/L (ref 0–18)
AST SERPL-CCNC: 19 U/L (ref 15–37)
BILIRUB DIRECT SERPL-MCNC: 0.2 MG/DL (ref 0–0.2)
BILIRUB SERPL-MCNC: 0.6 MG/DL (ref 0.1–2)
BILIRUB UR QL STRIP.AUTO: NEGATIVE
BUN BLD-MCNC: 31 MG/DL (ref 7–18)
CALCIUM BLD-MCNC: 9.3 MG/DL (ref 8.5–10.1)
CHLORIDE SERPL-SCNC: 103 MMOL/L (ref 98–112)
CHOLEST SERPL-MCNC: 127 MG/DL (ref ?–200)
CLARITY UR REFRACT.AUTO: CLEAR
CO2 SERPL-SCNC: 29 MMOL/L (ref 21–32)
COLOR UR AUTO: COLORLESS
CREAT BLD-MCNC: 1.33 MG/DL
CREAT UR-SCNC: 53.7 MG/DL
CREAT UR-SCNC: 53.7 MG/DL
EGFRCR SERPLBLD CKD-EPI 2021: 60 ML/MIN/1.73M2 (ref 60–?)
ERYTHROCYTE [DISTWIDTH] IN BLOOD BY AUTOMATED COUNT: 11.9 %
FASTING PATIENT LIPID ANSWER: YES
GLUCOSE BLD-MCNC: 148 MG/DL (ref 70–99)
GLUCOSE UR STRIP.AUTO-MCNC: NORMAL MG/DL
HCT VFR BLD AUTO: 40.2 %
HDLC SERPL-MCNC: 45 MG/DL (ref 40–59)
HGB BLD-MCNC: 13.1 G/DL
IRON SATN MFR SERPL: 25 %
IRON SERPL-MCNC: 91 UG/DL
KETONES UR STRIP.AUTO-MCNC: NEGATIVE MG/DL
LDLC SERPL CALC-MCNC: 57 MG/DL (ref ?–100)
LEUKOCYTE ESTERASE UR QL STRIP.AUTO: NEGATIVE
MCH RBC QN AUTO: 28.7 PG (ref 26–34)
MCHC RBC AUTO-ENTMCNC: 32.6 G/DL (ref 31–37)
MCV RBC AUTO: 88 FL
MICROALBUMIN UR-MCNC: 7.5 MG/DL
MICROALBUMIN/CREAT 24H UR-RTO: 139.7 UG/MG (ref ?–30)
NITRITE UR QL STRIP.AUTO: NEGATIVE
NONHDLC SERPL-MCNC: 82 MG/DL (ref ?–130)
OSMOLALITY SERPL CALC.SUM OF ELEC: 289 MOSM/KG (ref 275–295)
PH UR STRIP.AUTO: 6 [PH] (ref 5–8)
PHOSPHATE SERPL-MCNC: 3.1 MG/DL (ref 2.5–4.9)
PLATELET # BLD AUTO: 295 10(3)UL (ref 150–450)
POTASSIUM SERPL-SCNC: 4.8 MMOL/L (ref 3.5–5.1)
PROT SERPL-MCNC: 8 G/DL (ref 6.4–8.2)
PROT UR STRIP.AUTO-MCNC: NEGATIVE MG/DL
PROT UR-MCNC: 14.4 MG/DL
PROT/CREAT UR-RTO: 0.27
PTH-INTACT SERPL-MCNC: 74.4 PG/ML (ref 18.5–88)
RBC # BLD AUTO: 4.57 X10(6)UL
RBC UR QL AUTO: NEGATIVE
SODIUM SERPL-SCNC: 135 MMOL/L (ref 136–145)
SP GR UR STRIP.AUTO: 1.01 (ref 1–1.03)
T4 FREE SERPL-MCNC: 0.8 NG/DL (ref 0.8–1.7)
TIBC SERPL-MCNC: 358 UG/DL (ref 240–450)
TRANSFERRIN SERPL-MCNC: 240 MG/DL (ref 200–360)
TRIGL SERPL-MCNC: 143 MG/DL (ref 30–149)
TSI SER-ACNC: 3.76 MIU/ML (ref 0.36–3.74)
UROBILINOGEN UR STRIP.AUTO-MCNC: NORMAL MG/DL
VIT D+METAB SERPL-MCNC: 28.7 NG/ML (ref 30–100)
VLDLC SERPL CALC-MCNC: 21 MG/DL (ref 0–30)
WBC # BLD AUTO: 11 X10(3) UL (ref 4–11)

## 2023-08-30 PROCEDURE — 80076 HEPATIC FUNCTION PANEL: CPT

## 2023-08-30 PROCEDURE — 85027 COMPLETE CBC AUTOMATED: CPT

## 2023-08-30 PROCEDURE — 82570 ASSAY OF URINE CREATININE: CPT

## 2023-08-30 PROCEDURE — 81003 URINALYSIS AUTO W/O SCOPE: CPT

## 2023-08-30 PROCEDURE — 36415 COLL VENOUS BLD VENIPUNCTURE: CPT

## 2023-08-30 PROCEDURE — 82306 VITAMIN D 25 HYDROXY: CPT

## 2023-08-30 PROCEDURE — 83970 ASSAY OF PARATHORMONE: CPT

## 2023-08-30 PROCEDURE — 80069 RENAL FUNCTION PANEL: CPT

## 2023-08-30 PROCEDURE — 84156 ASSAY OF PROTEIN URINE: CPT

## 2023-08-30 PROCEDURE — 80061 LIPID PANEL: CPT

## 2023-08-30 PROCEDURE — 83550 IRON BINDING TEST: CPT

## 2023-08-30 PROCEDURE — 84439 ASSAY OF FREE THYROXINE: CPT

## 2023-08-30 PROCEDURE — 82043 UR ALBUMIN QUANTITATIVE: CPT

## 2023-08-30 PROCEDURE — 84443 ASSAY THYROID STIM HORMONE: CPT

## 2023-08-30 PROCEDURE — 83540 ASSAY OF IRON: CPT

## 2023-12-12 ENCOUNTER — HOSPITAL ENCOUNTER (EMERGENCY)
Facility: HOSPITAL | Age: 63
Discharge: HOME | End: 2023-12-12
Attending: EMERGENCY MEDICINE
Payer: COMMERCIAL

## 2023-12-12 ENCOUNTER — APPOINTMENT (OUTPATIENT)
Dept: ULTRASOUND IMAGING | Facility: HOSPITAL | Age: 63
End: 2023-12-12
Attending: EMERGENCY MEDICINE
Payer: COMMERCIAL

## 2023-12-12 VITALS
DIASTOLIC BLOOD PRESSURE: 78 MMHG | BODY MASS INDEX: 44.1 KG/M2 | RESPIRATION RATE: 17 BRPM | HEIGHT: 71 IN | TEMPERATURE: 97 F | SYSTOLIC BLOOD PRESSURE: 126 MMHG | WEIGHT: 315 LBS | OXYGEN SATURATION: 98 % | HEART RATE: 78 BPM

## 2023-12-12 DIAGNOSIS — G62.9 PERIPHERAL POLYNEUROPATHY: Primary | ICD-10-CM

## 2023-12-12 LAB
ALBUMIN SERPL-MCNC: 3.6 G/DL (ref 3.4–5)
ALBUMIN/GLOB SERPL: 0.7 {RATIO} (ref 1–2)
ALP LIVER SERPL-CCNC: 107 U/L
ALT SERPL-CCNC: 25 U/L
ANION GAP SERPL CALC-SCNC: 3 MMOL/L (ref 0–18)
AST SERPL-CCNC: 17 U/L (ref 15–37)
BASOPHILS # BLD AUTO: 0.05 X10(3) UL (ref 0–0.2)
BASOPHILS NFR BLD AUTO: 0.4 %
BILIRUB SERPL-MCNC: 0.5 MG/DL (ref 0.1–2)
BUN BLD-MCNC: 30 MG/DL (ref 9–23)
CALCIUM BLD-MCNC: 9.7 MG/DL (ref 8.5–10.1)
CHLORIDE SERPL-SCNC: 104 MMOL/L (ref 98–112)
CO2 SERPL-SCNC: 30 MMOL/L (ref 21–32)
CREAT BLD-MCNC: 1.65 MG/DL
EGFRCR SERPLBLD CKD-EPI 2021: 46 ML/MIN/1.73M2 (ref 60–?)
EOSINOPHIL # BLD AUTO: 0.16 X10(3) UL (ref 0–0.7)
EOSINOPHIL NFR BLD AUTO: 1.1 %
ERYTHROCYTE [DISTWIDTH] IN BLOOD BY AUTOMATED COUNT: 12.8 %
GLOBULIN PLAS-MCNC: 4.9 G/DL (ref 2.8–4.4)
GLUCOSE BLD-MCNC: 181 MG/DL (ref 70–99)
HCT VFR BLD AUTO: 42.5 %
HGB BLD-MCNC: 13.9 G/DL
IMM GRANULOCYTES # BLD AUTO: 0.04 X10(3) UL (ref 0–1)
IMM GRANULOCYTES NFR BLD: 0.3 %
LYMPHOCYTES # BLD AUTO: 3.36 X10(3) UL (ref 1–4)
LYMPHOCYTES NFR BLD AUTO: 23.8 %
MAGNESIUM SERPL-MCNC: 2.1 MG/DL (ref 1.6–2.6)
MCH RBC QN AUTO: 28.6 PG (ref 26–34)
MCHC RBC AUTO-ENTMCNC: 32.7 G/DL (ref 31–37)
MCV RBC AUTO: 87.4 FL
MONOCYTES # BLD AUTO: 0.77 X10(3) UL (ref 0.1–1)
MONOCYTES NFR BLD AUTO: 5.5 %
NEUTROPHILS # BLD AUTO: 9.74 X10 (3) UL (ref 1.5–7.7)
NEUTROPHILS # BLD AUTO: 9.74 X10(3) UL (ref 1.5–7.7)
NEUTROPHILS NFR BLD AUTO: 68.9 %
OSMOLALITY SERPL CALC.SUM OF ELEC: 295 MOSM/KG (ref 275–295)
PLATELET # BLD AUTO: 118 10(3)UL (ref 150–450)
POTASSIUM SERPL-SCNC: 4.5 MMOL/L (ref 3.5–5.1)
PROT SERPL-MCNC: 8.5 G/DL (ref 6.4–8.2)
RBC # BLD AUTO: 4.86 X10(6)UL
SODIUM SERPL-SCNC: 137 MMOL/L (ref 136–145)
WBC # BLD AUTO: 14.1 X10(3) UL (ref 4–11)

## 2023-12-12 PROCEDURE — 85025 COMPLETE CBC W/AUTO DIFF WBC: CPT | Performed by: EMERGENCY MEDICINE

## 2023-12-12 PROCEDURE — 99285 EMERGENCY DEPT VISIT HI MDM: CPT

## 2023-12-12 PROCEDURE — 83735 ASSAY OF MAGNESIUM: CPT | Performed by: EMERGENCY MEDICINE

## 2023-12-12 PROCEDURE — 80053 COMPREHEN METABOLIC PANEL: CPT | Performed by: EMERGENCY MEDICINE

## 2023-12-12 PROCEDURE — 99284 EMERGENCY DEPT VISIT MOD MDM: CPT

## 2023-12-12 PROCEDURE — 36415 COLL VENOUS BLD VENIPUNCTURE: CPT

## 2023-12-12 PROCEDURE — 93970 EXTREMITY STUDY: CPT | Performed by: EMERGENCY MEDICINE

## 2023-12-12 RX ORDER — GABAPENTIN 100 MG/1
100 CAPSULE ORAL 3 TIMES DAILY
Qty: 60 CAPSULE | Refills: 0 | Status: SHIPPED | OUTPATIENT
Start: 2023-12-12

## 2023-12-12 NOTE — ED QUICK NOTES
Patient returns from [address] [place] [address],[address] at this time. Pt to CT at this time via stretcher.

## 2023-12-12 NOTE — CM/SW NOTE
CM assistance requested by Anna Upton due to patient not having transportation home. Pt was dc'd from the ED and [facility] was to pick him up. Pt states he called 3 times and was told there were no available drivers. CM spoke with Juan Carlos Dyson at [facility] and since pt is not ambulatory, insurance should cover [employer] transport home. Juan Carlos Dyson will send the medicar  to see if safe transportation can be done. CM informed patient of the above. Pt states he called a number on his transport card and was told a  will be picking him up since he was \"abandoned\"here. CM informed Ab at [facility] that a medicar was not needed.

## 2023-12-12 NOTE — ED QUICK NOTES
Rounded on patient. VSS. Transported to [address] by this RN.

## 2023-12-12 NOTE — ED INITIAL ASSESSMENT (HPI)
Patient to ED c/o bilateral feet pain to toes and balls of feet. + discoloration, this is not new for patient.   Pain is relieved when elevating the feet.    + L pedal pulses and right with doppler

## 2024-02-06 ENCOUNTER — LAB ENCOUNTER (OUTPATIENT)
Dept: LAB | Facility: HOSPITAL | Age: 64
End: 2024-02-06
Attending: STUDENT IN AN ORGANIZED HEALTH CARE EDUCATION/TRAINING PROGRAM
Payer: COMMERCIAL

## 2024-02-06 ENCOUNTER — APPOINTMENT (OUTPATIENT)
Dept: ULTRASOUND IMAGING | Facility: HOSPITAL | Age: 64
End: 2024-02-06
Attending: STUDENT IN AN ORGANIZED HEALTH CARE EDUCATION/TRAINING PROGRAM
Payer: COMMERCIAL

## 2024-02-06 ENCOUNTER — HOSPITAL ENCOUNTER (OUTPATIENT)
Dept: ULTRASOUND IMAGING | Facility: HOSPITAL | Age: 64
Discharge: HOME | End: 2024-02-06
Attending: STUDENT IN AN ORGANIZED HEALTH CARE EDUCATION/TRAINING PROGRAM
Payer: COMMERCIAL

## 2024-02-06 DIAGNOSIS — N18.9 CHRONIC KIDNEY DISEASE-MINERAL AND BONE DISORDER: ICD-10-CM

## 2024-02-06 DIAGNOSIS — E11.42 DIABETIC POLYNEUROPATHY (HCC): ICD-10-CM

## 2024-02-06 DIAGNOSIS — I10 ESSENTIAL HYPERTENSION, MALIGNANT: ICD-10-CM

## 2024-02-06 DIAGNOSIS — D63.1 ANEMIA DUE TO CHRONIC KIDNEY DISEASE, UNSPECIFIED CKD STAGE: Primary | ICD-10-CM

## 2024-02-06 DIAGNOSIS — N18.32 STAGE 3B CHRONIC KIDNEY DISEASE (CKD) (HCC): ICD-10-CM

## 2024-02-06 DIAGNOSIS — N18.9 ANEMIA DUE TO CHRONIC KIDNEY DISEASE, UNSPECIFIED CKD STAGE: Primary | ICD-10-CM

## 2024-02-06 DIAGNOSIS — M89.9 CHRONIC KIDNEY DISEASE-MINERAL AND BONE DISORDER: ICD-10-CM

## 2024-02-06 DIAGNOSIS — N18.32 STAGE 3B CHRONIC KIDNEY DISEASE (HCC): ICD-10-CM

## 2024-02-06 DIAGNOSIS — E83.9 CHRONIC KIDNEY DISEASE-MINERAL AND BONE DISORDER: ICD-10-CM

## 2024-02-06 DIAGNOSIS — R80.9 ALBUMINURIA: ICD-10-CM

## 2024-02-06 DIAGNOSIS — Z12.5 SPECIAL SCREENING FOR MALIGNANT NEOPLASM OF PROSTATE: ICD-10-CM

## 2024-02-06 DIAGNOSIS — E87.5 HYPERKALEMIA: ICD-10-CM

## 2024-02-06 DIAGNOSIS — N18.9 ANEMIA DUE TO CHRONIC KIDNEY DISEASE, UNSPECIFIED CKD STAGE: ICD-10-CM

## 2024-02-06 DIAGNOSIS — E78.2 MIXED HYPERLIPIDEMIA: ICD-10-CM

## 2024-02-06 DIAGNOSIS — D63.1 ANEMIA DUE TO CHRONIC KIDNEY DISEASE, UNSPECIFIED CKD STAGE: ICD-10-CM

## 2024-02-06 DIAGNOSIS — I10 ESSENTIAL HYPERTENSION: ICD-10-CM

## 2024-02-06 LAB
ALBUMIN SERPL-MCNC: 3.8 G/DL (ref 3.4–5)
ANION GAP SERPL CALC-SCNC: 3 MMOL/L (ref 0–18)
BILIRUB UR QL STRIP.AUTO: NEGATIVE
BUN BLD-MCNC: 31 MG/DL (ref 9–23)
CALCIUM BLD-MCNC: 9.4 MG/DL (ref 8.5–10.1)
CHLORIDE SERPL-SCNC: 102 MMOL/L (ref 98–112)
CHOLEST SERPL-MCNC: 189 MG/DL (ref ?–200)
CLARITY UR REFRACT.AUTO: CLEAR
CO2 SERPL-SCNC: 30 MMOL/L (ref 21–32)
CREAT BLD-MCNC: 1.5 MG/DL
CREAT UR-SCNC: 143 MG/DL
CREAT UR-SCNC: 143 MG/DL
DEPRECATED HBV CORE AB SER IA-ACNC: 78.6 NG/ML
EGFRCR SERPLBLD CKD-EPI 2021: 52 ML/MIN/1.73M2 (ref 60–?)
FASTING PATIENT LIPID ANSWER: NO
GLUCOSE BLD-MCNC: 101 MG/DL (ref 70–99)
GLUCOSE UR STRIP.AUTO-MCNC: 50 MG/DL
HDLC SERPL-MCNC: 49 MG/DL (ref 40–59)
HYALINE CASTS #/AREA URNS AUTO: PRESENT /LPF
IRON SATN MFR SERPL: 23 %
IRON SERPL-MCNC: 82 UG/DL
KETONES UR STRIP.AUTO-MCNC: NEGATIVE MG/DL
LDLC SERPL CALC-MCNC: 108 MG/DL (ref ?–100)
LEUKOCYTE ESTERASE UR QL STRIP.AUTO: NEGATIVE
MICROALBUMIN UR-MCNC: 61.1 MG/DL
MICROALBUMIN/CREAT 24H UR-RTO: 427.3 UG/MG (ref ?–30)
NITRITE UR QL STRIP.AUTO: NEGATIVE
NONHDLC SERPL-MCNC: 140 MG/DL (ref ?–130)
OSMOLALITY SERPL CALC.SUM OF ELEC: 287 MOSM/KG (ref 275–295)
PH UR STRIP.AUTO: 5.5 [PH] (ref 5–8)
PHOSPHATE SERPL-MCNC: 3.5 MG/DL (ref 2.5–4.9)
POTASSIUM SERPL-SCNC: 4.2 MMOL/L (ref 3.5–5.1)
PROT UR STRIP.AUTO-MCNC: 50 MG/DL
PROT UR-MCNC: 79.9 MG/DL
PROT/CREAT UR-RTO: 0.56
PSA SERPL-MCNC: 0.53 NG/ML (ref ?–4)
PTH-INTACT SERPL-MCNC: 72.4 PG/ML (ref 18.5–88)
RBC UR QL AUTO: NEGATIVE
SODIUM SERPL-SCNC: 135 MMOL/L (ref 136–145)
SP GR UR STRIP.AUTO: 1.01 (ref 1–1.03)
T4 FREE SERPL-MCNC: 0.9 NG/DL (ref 0.8–1.7)
TIBC SERPL-MCNC: 355 UG/DL (ref 240–450)
TRANSFERRIN SERPL-MCNC: 238 MG/DL (ref 200–360)
TRIGL SERPL-MCNC: 184 MG/DL (ref 30–149)
TSI SER-ACNC: 5.21 MIU/ML (ref 0.36–3.74)
UROBILINOGEN UR STRIP.AUTO-MCNC: NORMAL MG/DL
VIT D+METAB SERPL-MCNC: 24.8 NG/ML (ref 30–100)
VLDLC SERPL CALC-MCNC: 31 MG/DL (ref 0–30)

## 2024-02-06 PROCEDURE — 82570 ASSAY OF URINE CREATININE: CPT

## 2024-02-06 PROCEDURE — 83540 ASSAY OF IRON: CPT

## 2024-02-06 PROCEDURE — 36415 COLL VENOUS BLD VENIPUNCTURE: CPT

## 2024-02-06 PROCEDURE — 84443 ASSAY THYROID STIM HORMONE: CPT

## 2024-02-06 PROCEDURE — 76770 US EXAM ABDO BACK WALL COMP: CPT | Performed by: STUDENT IN AN ORGANIZED HEALTH CARE EDUCATION/TRAINING PROGRAM

## 2024-02-06 PROCEDURE — 84439 ASSAY OF FREE THYROXINE: CPT

## 2024-02-06 PROCEDURE — 83970 ASSAY OF PARATHORMONE: CPT

## 2024-02-06 PROCEDURE — 80069 RENAL FUNCTION PANEL: CPT

## 2024-02-06 PROCEDURE — 82728 ASSAY OF FERRITIN: CPT

## 2024-02-06 PROCEDURE — 82043 UR ALBUMIN QUANTITATIVE: CPT

## 2024-02-06 PROCEDURE — 83550 IRON BINDING TEST: CPT

## 2024-02-06 PROCEDURE — 81001 URINALYSIS AUTO W/SCOPE: CPT

## 2024-02-06 PROCEDURE — 84156 ASSAY OF PROTEIN URINE: CPT

## 2024-02-06 PROCEDURE — 82306 VITAMIN D 25 HYDROXY: CPT

## 2024-02-06 PROCEDURE — 80061 LIPID PANEL: CPT

## 2024-02-06 PROCEDURE — 84153 ASSAY OF PSA TOTAL: CPT

## 2024-08-14 ENCOUNTER — APPOINTMENT (OUTPATIENT)
Dept: GENERAL RADIOLOGY | Facility: HOSPITAL | Age: 64
End: 2024-08-14
Payer: COMMERCIAL

## 2024-08-14 ENCOUNTER — HOSPITAL ENCOUNTER (OUTPATIENT)
Facility: HOSPITAL | Age: 64
Setting detail: OBSERVATION
Discharge: HOME | End: 2024-08-15
Attending: EMERGENCY MEDICINE | Admitting: INTERNAL MEDICINE
Payer: COMMERCIAL

## 2024-08-14 DIAGNOSIS — U07.1 COVID-19: ICD-10-CM

## 2024-08-14 DIAGNOSIS — J02.0 STREP PHARYNGITIS: ICD-10-CM

## 2024-08-14 DIAGNOSIS — R07.9 ACUTE CHEST PAIN: Primary | ICD-10-CM

## 2024-08-14 LAB
ALBUMIN SERPL-MCNC: 4.6 G/DL (ref 3.2–4.8)
ALBUMIN/GLOB SERPL: 1.2 {RATIO} (ref 1–2)
ALP LIVER SERPL-CCNC: 94 U/L
ALT SERPL-CCNC: 14 U/L
ANION GAP SERPL CALC-SCNC: 8 MMOL/L (ref 0–18)
AST SERPL-CCNC: 29 U/L (ref ?–34)
ATRIAL RATE: 82 BPM
BASOPHILS # BLD AUTO: 0.03 X10(3) UL (ref 0–0.2)
BASOPHILS NFR BLD AUTO: 0.3 %
BILIRUB SERPL-MCNC: 0.5 MG/DL (ref 0.2–1.1)
BUN BLD-MCNC: 26 MG/DL (ref 9–23)
CALCIUM BLD-MCNC: 10.3 MG/DL (ref 8.7–10.4)
CHLORIDE SERPL-SCNC: 102 MMOL/L (ref 98–112)
CO2 SERPL-SCNC: 27 MMOL/L (ref 21–32)
CREAT BLD-MCNC: 1.76 MG/DL
D DIMER PPP FEU-MCNC: 0.64 UG/ML FEU (ref ?–0.65)
EGFRCR SERPLBLD CKD-EPI 2021: 42 ML/MIN/1.73M2 (ref 60–?)
EOSINOPHIL # BLD AUTO: 0.06 X10(3) UL (ref 0–0.7)
EOSINOPHIL NFR BLD AUTO: 0.6 %
ERYTHROCYTE [DISTWIDTH] IN BLOOD BY AUTOMATED COUNT: 12.9 %
GLOBULIN PLAS-MCNC: 3.7 G/DL (ref 2–3.5)
GLUCOSE BLD-MCNC: 114 MG/DL (ref 70–99)
GLUCOSE BLD-MCNC: 115 MG/DL (ref 70–99)
HCT VFR BLD AUTO: 43.2 %
HGB BLD-MCNC: 14.2 G/DL
IMM GRANULOCYTES # BLD AUTO: 0.03 X10(3) UL (ref 0–1)
IMM GRANULOCYTES NFR BLD: 0.3 %
LYMPHOCYTES # BLD AUTO: 2.44 X10(3) UL (ref 1–4)
LYMPHOCYTES NFR BLD AUTO: 24.4 %
MCH RBC QN AUTO: 29 PG (ref 26–34)
MCHC RBC AUTO-ENTMCNC: 32.9 G/DL (ref 31–37)
MCV RBC AUTO: 88.3 FL
MONOCYTES # BLD AUTO: 0.93 X10(3) UL (ref 0.1–1)
MONOCYTES NFR BLD AUTO: 9.3 %
NEUTROPHILS # BLD AUTO: 6.5 X10 (3) UL (ref 1.5–7.7)
NEUTROPHILS # BLD AUTO: 6.5 X10(3) UL (ref 1.5–7.7)
NEUTROPHILS NFR BLD AUTO: 65.1 %
OSMOLALITY SERPL CALC.SUM OF ELEC: 290 MOSM/KG (ref 275–295)
P AXIS: 55 DEGREES
P-R INTERVAL: 144 MS
PLATELET # BLD AUTO: 182 10(3)UL (ref 150–450)
POTASSIUM SERPL-SCNC: 4.2 MMOL/L (ref 3.5–5.1)
PROT SERPL-MCNC: 8.3 G/DL (ref 5.7–8.2)
Q-T INTERVAL: 394 MS
QRS DURATION: 132 MS
QTC CALCULATION (BEZET): 460 MS
R AXIS: 67 DEGREES
RBC # BLD AUTO: 4.89 X10(6)UL
SARS-COV-2 RNA RESP QL NAA+PROBE: DETECTED
SODIUM SERPL-SCNC: 137 MMOL/L (ref 136–145)
T AXIS: 47 DEGREES
TROPONIN I SERPL HS-MCNC: 10 NG/L
VENTRICULAR RATE: 82 BPM
WBC # BLD AUTO: 10 X10(3) UL (ref 4–11)

## 2024-08-14 PROCEDURE — 85025 COMPLETE CBC W/AUTO DIFF WBC: CPT | Performed by: EMERGENCY MEDICINE

## 2024-08-14 PROCEDURE — 93010 ELECTROCARDIOGRAM REPORT: CPT

## 2024-08-14 PROCEDURE — 87430 STREP A AG IA: CPT | Performed by: EMERGENCY MEDICINE

## 2024-08-14 PROCEDURE — 99285 EMERGENCY DEPT VISIT HI MDM: CPT

## 2024-08-14 PROCEDURE — 80053 COMPREHEN METABOLIC PANEL: CPT

## 2024-08-14 PROCEDURE — 84484 ASSAY OF TROPONIN QUANT: CPT | Performed by: INTERNAL MEDICINE

## 2024-08-14 PROCEDURE — 84484 ASSAY OF TROPONIN QUANT: CPT | Performed by: EMERGENCY MEDICINE

## 2024-08-14 PROCEDURE — 83036 HEMOGLOBIN GLYCOSYLATED A1C: CPT | Performed by: INTERNAL MEDICINE

## 2024-08-14 PROCEDURE — 80053 COMPREHEN METABOLIC PANEL: CPT | Performed by: EMERGENCY MEDICINE

## 2024-08-14 PROCEDURE — 84484 ASSAY OF TROPONIN QUANT: CPT

## 2024-08-14 PROCEDURE — 71045 X-RAY EXAM CHEST 1 VIEW: CPT | Performed by: EMERGENCY MEDICINE

## 2024-08-14 PROCEDURE — 87430 STREP A AG IA: CPT

## 2024-08-14 PROCEDURE — 82962 GLUCOSE BLOOD TEST: CPT

## 2024-08-14 PROCEDURE — 85379 FIBRIN DEGRADATION QUANT: CPT | Performed by: EMERGENCY MEDICINE

## 2024-08-14 PROCEDURE — 85025 COMPLETE CBC W/AUTO DIFF WBC: CPT

## 2024-08-14 PROCEDURE — 36415 COLL VENOUS BLD VENIPUNCTURE: CPT

## 2024-08-14 PROCEDURE — 93005 ELECTROCARDIOGRAM TRACING: CPT

## 2024-08-14 RX ORDER — DEXTROSE MONOHYDRATE 25 G/50ML
50 INJECTION, SOLUTION INTRAVENOUS
Status: DISCONTINUED | OUTPATIENT
Start: 2024-08-14 | End: 2024-08-15

## 2024-08-14 RX ORDER — INSULIN ASPART 100 [IU]/ML
INJECTION, SOLUTION INTRAVENOUS; SUBCUTANEOUS
COMMUNITY

## 2024-08-14 RX ORDER — ATORVASTATIN CALCIUM 20 MG/1
20 TABLET, FILM COATED ORAL NIGHTLY
Status: DISCONTINUED | OUTPATIENT
Start: 2024-08-14 | End: 2024-08-15

## 2024-08-14 RX ORDER — TORSEMIDE 10 MG/1
10 TABLET ORAL DAILY
COMMUNITY
Start: 2024-06-07

## 2024-08-14 RX ORDER — ASPIRIN 325 MG
325 TABLET ORAL ONCE
Status: COMPLETED | OUTPATIENT
Start: 2024-08-14 | End: 2024-08-14

## 2024-08-14 RX ORDER — PENICILLIN V POTASSIUM 500 MG/1
500 TABLET ORAL ONCE
Status: COMPLETED | OUTPATIENT
Start: 2024-08-14 | End: 2024-08-14

## 2024-08-14 RX ORDER — SODIUM CHLORIDE, SODIUM LACTATE, POTASSIUM CHLORIDE, CALCIUM CHLORIDE 600; 310; 30; 20 MG/100ML; MG/100ML; MG/100ML; MG/100ML
INJECTION, SOLUTION INTRAVENOUS CONTINUOUS
Status: DISCONTINUED | OUTPATIENT
Start: 2024-08-14 | End: 2024-08-15

## 2024-08-14 RX ORDER — NICOTINE POLACRILEX 4 MG
30 LOZENGE BUCCAL
Status: DISCONTINUED | OUTPATIENT
Start: 2024-08-14 | End: 2024-08-15

## 2024-08-14 RX ORDER — MELATONIN
3 NIGHTLY PRN
Status: DISCONTINUED | OUTPATIENT
Start: 2024-08-14 | End: 2024-08-15

## 2024-08-14 RX ORDER — GABAPENTIN 100 MG/1
100 CAPSULE ORAL 3 TIMES DAILY
Status: DISCONTINUED | OUTPATIENT
Start: 2024-08-14 | End: 2024-08-15

## 2024-08-14 RX ORDER — INSULIN GLARGINE 300 U/ML
80 INJECTION, SOLUTION SUBCUTANEOUS EVERY MORNING
COMMUNITY

## 2024-08-14 RX ORDER — INSULIN DEGLUDEC 100 U/ML
25 INJECTION, SOLUTION SUBCUTANEOUS DAILY
Status: DISCONTINUED | OUTPATIENT
Start: 2024-08-14 | End: 2024-08-15

## 2024-08-14 RX ORDER — MAGNESIUM OXIDE 400 MG/1
400 TABLET ORAL 2 TIMES DAILY
Status: DISCONTINUED | OUTPATIENT
Start: 2024-08-14 | End: 2024-08-15

## 2024-08-14 RX ORDER — DULOXETIN HYDROCHLORIDE 30 MG/1
60 CAPSULE, DELAYED RELEASE ORAL DAILY
Status: DISCONTINUED | OUTPATIENT
Start: 2024-08-15 | End: 2024-08-15

## 2024-08-14 RX ORDER — NICOTINE POLACRILEX 4 MG
15 LOZENGE BUCCAL
Status: DISCONTINUED | OUTPATIENT
Start: 2024-08-14 | End: 2024-08-15

## 2024-08-14 RX ORDER — GABAPENTIN 300 MG/1
300 CAPSULE ORAL 2 TIMES DAILY
COMMUNITY

## 2024-08-14 RX ORDER — TIRZEPATIDE 12.5 MG/.5ML
12.5 INJECTION, SOLUTION SUBCUTANEOUS WEEKLY
COMMUNITY
Start: 2024-07-07

## 2024-08-14 NOTE — ED PROVIDER NOTES
Patient Seen in: [facility] Emergency Department      History     Chief Complaint   Patient presents with    Chest Pain Angina     Stated Complaint: CP    Subjective:   HPI    64-year-old male sent from PCP office for chest pain.  He reports he performed at home COVID test yesterday that were questionably positive.  He said he has had a lot of sinus congestion and nasal drainage with cough and fatigue.  He did a televisit yesterday and was seen curds to go see his primary care doctor so someone could listen to his lung sounds.  While he was at his PCP office today said he developed an intense sharp stabbing pain across the anterior chest and felt like he was going to faint.  The evaluating NP then sent the patient to the emergency room.  He denies any chest pain at this time.  He says while he was having the sharp pain at the clinic it was exacerbated by movements.  He does report a history of atrial fibrillation and is on blood thinners.  Also reporting sore throat    Objective:   Past Medical History:    Adhesive capsulitis    LUE    Atrial fibrillation (HCC)     Recently diagnosed atrial flutter// RBBB  JOSE guided CV 9/4/20     Cardiomyopathy (HCC)    )  DCM EF 25-30%  8/30  Tachy mediated ? EF 55-60% JOSE 9/4    Diabetic peripheral neuropathy associated with type 2 diabetes mellitus ([facility])    by exam and symptoms    Disc herniation    L5-S1 disc herniation after slipping on ice. This was medically managed    DJD (degenerative joint disease) of knee    bilat-received Synvisc-One injections to both knees    Hypertension    Hypertension associated with diabetes (HCC)    Mixed hyperlipidemia    Morbid obesity ([facility])    Osteoarthritis, localized, knee    left    Renal insufficiency, mild    Was hospitalized due to dehydration and had acute renal insufficiency (creatinine 1.85)    Shingles    Sleep apnea, obstructive    HTR as of 4-16-12    Type II or unspecified type diabetes mellitus without mention of  complication, uncontrolled    Type 2 DM              Past Surgical History:   Procedure Laterality Date    Other surgical history      Root canals                Social History     Socioeconomic History    Marital status:     Number of children: 3   Occupational History    Occupation:      Comment: Repairs computers   Tobacco Use    Smoking status: Never    Smokeless tobacco: Never   Vaping Use    Vaping status: Never Used   Substance and Sexual Activity    Alcohol use: No    Drug use: No   Other Topics Concern    Caffeine Concern Yes     Comment: soda     Exercise No              Review of Systems    Positive for stated Chief Complaint: Chest Pain Angina    Other systems are as noted in HPI.  Constitutional and vital signs reviewed.      All other systems reviewed and negative except as noted above.    Physical Exam     ED Triage Vitals [08/14/24 1538]   /75   Pulse 78   Resp 14   Temp 97.2 °F (36.2 °C)   Temp src Temporal   SpO2 97 %   O2 Device None (Room air)       Current Vitals:   Vital Signs  BP: 145/75  Pulse: 79  Resp: 21  Temp: 97.2 °F (36.2 °C)  Temp src: Temporal    Oxygen Therapy  SpO2: 97 %  O2 Device: None (Room air)            Physical Exam    General:  Vitals as listed.  No acute distress.  Morbid obesity.  HEENT: Sclerae anicteric.  Conjunctivae show no pallor.  Oropharynx clear, mucous membranes moist.  Tonsillar edema without significant erythema or exudate.  Neck: supple, no rigidity   Lungs: good air exchange and clear   Heart: regular rate rhythm and no murmur.  Tenderness on palpation across the anterior chest.  Patient states this is not the same pain he experienced earlier  Abdomen: Soft and nontender.  No abdominal masses.  No peritoneal signs   Extremities: no edema, normal peripheral pulses   Neuro: Alert oriented and nonfocal   Skin: no rashes or nodules    ED Course     Labs Reviewed   COMP METABOLIC PANEL (14) - Abnormal; Notable for the following  components:       Result Value    Glucose 115 (*)     BUN 26 (*)     Creatinine 1.76 (*)     eGFR-Cr 42 (*)     Total Protein 8.3 (*)     Globulin  3.7 (*)     All other components within normal limits   RAPID SARS-COV-2 BY PCR - Abnormal; Notable for the following components:    Rapid SARS-CoV-2 by PCR Detected (*)     All other components within normal limits   RAPID STREP A SCREEN (LC) - Abnormal; Notable for the following components:    Rapid Strep A Result Positive for Beta Streptococcus, Group A (*)     All other components within normal limits   TROPONIN I HIGH SENSITIVITY - Normal   D-DIMER - Normal   CBC WITH DIFFERENTIAL WITH PLATELET   RAINBOW DRAW LAVENDER   RAINBOW DRAW LIGHT GREEN   RAINBOW DRAW BLUE   RAINBOW DRAW GOLD     EKG    Rate, intervals and axes as noted on EKG Report.  Rate: 82  Rhythm: Sinus Rhythm  Reading: RBBB.  No acute ischemia.                 XR CHEST AP PORTABLE  (CPT=71045)    Result Date: 8/14/2024  PROCEDURE:  XR CHEST AP PORTABLE  (CPT=71045)  TECHNIQUE:  AP chest radiograph was obtained.  COMPARISON:  [facility] , XR, XR CHEST AP PORTABLE  (CPT=71045), 7/09/2021, 6:36 PM.  [facility] , XR, XR CHEST AP/PA (1 VIEW) (CPT=71045), 8/30/2020, 2:47 AM.  INDICATIONS:  CP  PATIENT STATED HISTORY: (As transcribed by Technologist)  Pt. with chest pain and difficulty breathing.    FINDINGS:  No focal consolidation.  No pleural effusion.  No pneumothorax.  No pulmonary edema.  The cardiomediastinal silhouette is within normal limits.  No acute osseous findings.            CONCLUSION:  No acute radiographic findings.   LOCATION:  [facility]      Dictated by (CST): Yobani Gonzáles MD on 8/14/2024 at 4:42 PM     Finalized by (CST): Yobani Gonzáles MD on 8/14/2024 at 4:42 PM               MDM      64-year-old male presents for chest pain.  Reports possibly positive COVID test at home yesterday.  Sinus congestion with cough, fatigue.  At his PCP office today when he developed a period of intense sharp pain  across the anterior chest that was increased with movements.  He felt like he was going to pass out during the episode.    Additional history obtained by duly outpatient clinic documentation from today reports that the patient had crushing chest pain.  They report vitals of blood pressure 100/70, pulse 85, oxygen saturation 96%.    Differential includes but is not limited to musculoskeletal chest pain, COVID-19, strep pharyngitis, cardiac ischemia, pneumonia, a life threat.    CBC, CMP, troponin, D-dimer, rapid strep, COVID test, chest x-ray ordered for further evaluation.    My independent interpretation of chest x-ray is that there is no pneumothorax.    Radiology reports \"no acute radiographic findings including consolidation.    Strep and COVID both positive.  He does have reproducible chest pain across the anterior chest wall though he states it is not the same sharp pain he experienced at the clinic today.  He says he never had pain like that before and he was not sure if he might be having a heart attack.    Initial cardiac enzyme is negative.  D-dimer is negative and does not suggest pulmonary embolism.  No acute findings on EKG.  Discussed with cardiology.  Patient has multiple risk factors and recommendation is hospitalization.  Aspirin 325 mg ordered.  Patient agrees to plan for hospitalization.  Penicillin 500 mg p.o. for strep pharyngitis          Admission disposition: 8/14/2024  6:04 PM                                        Medical Decision Making      Disposition and Plan     Clinical Impression:  1. Acute chest pain    2. COVID-19    3. Strep pharyngitis         Disposition:  Admit  8/14/2024  6:04 pm    Follow-up:  No follow-up provider specified.        Medications Prescribed:  Current Discharge Medication List                            Hospital Problems       Present on Admission  Date Reviewed: 3/17/2022            ICD-10-CM Noted POA    * (Principal) Acute chest pain R07.9 8/14/2024 Unknown

## 2024-08-14 NOTE — ED INITIAL ASSESSMENT (HPI)
Pt noted nasal congestion yesterday, states did a tele health visit today.  C/o chest congestion.  Pt states went to Dr Sam's office.  States had \"super sharp\" chest pain while at the PMD's office.  Pt sent from PMD's office.  States tested positive for covid at home yesterday.

## 2024-08-14 NOTE — ED QUICK NOTES
Orders for admission, patient is aware of plan and ready to go upstairs. Any questions, please call ED TLA Chacko at extension [phone].     Patient Covid vaccination status: Fully vaccinated     COVID Test Ordered in ED: Rapid SARS-CoV-2 by PCR    COVID Suspicion at Admission: N/A    Running Infusions:  None    Mental Status/LOC at time of transport: A&Ox4    Other pertinent information:   CIWA score: N/A   NIH score:  N/A

## 2024-08-15 ENCOUNTER — APPOINTMENT (OUTPATIENT)
Dept: CV DIAGNOSTICS | Facility: HOSPITAL | Age: 64
End: 2024-08-15
Attending: INTERNAL MEDICINE
Payer: COMMERCIAL

## 2024-08-15 VITALS
HEART RATE: 75 BPM | DIASTOLIC BLOOD PRESSURE: 60 MMHG | HEIGHT: 71 IN | RESPIRATION RATE: 21 BRPM | WEIGHT: 315 LBS | BODY MASS INDEX: 44.1 KG/M2 | OXYGEN SATURATION: 98 % | SYSTOLIC BLOOD PRESSURE: 113 MMHG | TEMPERATURE: 98 F

## 2024-08-15 PROBLEM — R07.9 ACUTE CHEST PAIN: Status: RESOLVED | Noted: 2024-08-14 | Resolved: 2024-08-15

## 2024-08-15 LAB
ANION GAP SERPL CALC-SCNC: 7 MMOL/L (ref 0–18)
BUN BLD-MCNC: 25 MG/DL (ref 9–23)
CALCIUM BLD-MCNC: 9.7 MG/DL (ref 8.7–10.4)
CHLORIDE SERPL-SCNC: 104 MMOL/L (ref 98–112)
CO2 SERPL-SCNC: 28 MMOL/L (ref 21–32)
CREAT BLD-MCNC: 1.61 MG/DL
EGFRCR SERPLBLD CKD-EPI 2021: 47 ML/MIN/1.73M2 (ref 60–?)
ERYTHROCYTE [DISTWIDTH] IN BLOOD BY AUTOMATED COUNT: 12.7 %
EST. AVERAGE GLUCOSE BLD GHB EST-MCNC: 166 MG/DL (ref 68–126)
GLUCOSE BLD-MCNC: 123 MG/DL (ref 70–99)
GLUCOSE BLD-MCNC: 127 MG/DL (ref 70–99)
GLUCOSE BLD-MCNC: 165 MG/DL (ref 70–99)
HBA1C MFR BLD: 7.4 % (ref ?–5.7)
HCT VFR BLD AUTO: 39.8 %
HGB BLD-MCNC: 13.1 G/DL
MAGNESIUM SERPL-MCNC: 2.1 MG/DL (ref 1.6–2.6)
MCH RBC QN AUTO: 29 PG (ref 26–34)
MCHC RBC AUTO-ENTMCNC: 32.9 G/DL (ref 31–37)
MCV RBC AUTO: 88.2 FL
OSMOLALITY SERPL CALC.SUM OF ELEC: 294 MOSM/KG (ref 275–295)
PHOSPHATE SERPL-MCNC: 4.7 MG/DL (ref 2.4–5.1)
PLATELET # BLD AUTO: 145 10(3)UL (ref 150–450)
POTASSIUM SERPL-SCNC: 4.4 MMOL/L (ref 3.5–5.1)
RBC # BLD AUTO: 4.51 X10(6)UL
SODIUM SERPL-SCNC: 139 MMOL/L (ref 136–145)
TROPONIN I SERPL HS-MCNC: 9 NG/L
TROPONIN I SERPL HS-MCNC: 9 NG/L
WBC # BLD AUTO: 7 X10(3) UL (ref 4–11)

## 2024-08-15 PROCEDURE — 82962 GLUCOSE BLOOD TEST: CPT

## 2024-08-15 PROCEDURE — 93306 TTE W/DOPPLER COMPLETE: CPT | Performed by: INTERNAL MEDICINE

## 2024-08-15 PROCEDURE — 83735 ASSAY OF MAGNESIUM: CPT | Performed by: INTERNAL MEDICINE

## 2024-08-15 PROCEDURE — 97161 PT EVAL LOW COMPLEX 20 MIN: CPT

## 2024-08-15 PROCEDURE — 97530 THERAPEUTIC ACTIVITIES: CPT

## 2024-08-15 PROCEDURE — 84484 ASSAY OF TROPONIN QUANT: CPT | Performed by: INTERNAL MEDICINE

## 2024-08-15 PROCEDURE — 97165 OT EVAL LOW COMPLEX 30 MIN: CPT

## 2024-08-15 PROCEDURE — 80048 BASIC METABOLIC PNL TOTAL CA: CPT | Performed by: INTERNAL MEDICINE

## 2024-08-15 PROCEDURE — 85027 COMPLETE CBC AUTOMATED: CPT | Performed by: INTERNAL MEDICINE

## 2024-08-15 PROCEDURE — 84100 ASSAY OF PHOSPHORUS: CPT | Performed by: INTERNAL MEDICINE

## 2024-08-15 PROCEDURE — 97535 SELF CARE MNGMENT TRAINING: CPT

## 2024-08-15 RX ORDER — AMOXICILLIN 500 MG/1
500 CAPSULE ORAL EVERY 12 HOURS SCHEDULED
Qty: 18 CAPSULE | Refills: 0 | Status: SHIPPED | OUTPATIENT
Start: 2024-08-15 | End: 2024-08-24

## 2024-08-15 RX ORDER — CALCIUM CARBONATE 500 MG/1
500 TABLET, CHEWABLE ORAL EVERY 6 HOURS PRN
Status: DISCONTINUED | OUTPATIENT
Start: 2024-08-15 | End: 2024-08-15

## 2024-08-15 RX ORDER — AMOXICILLIN 500 MG/1
500 CAPSULE ORAL EVERY 12 HOURS SCHEDULED
Status: DISCONTINUED | OUTPATIENT
Start: 2024-08-15 | End: 2024-08-15

## 2024-08-15 NOTE — H&P
[facility] Hospitalist History and Physical      PCP: Adams Sam MD      History of Present Illness: This is the story of Mr. Tj Draper who is a 65 y/o M w/ PMHx of HTN, HLD, Morbid Obesity, T2DM, CKD3, AF, MDD, OA, DJD, who presented to the PCP office w/ chief complaints of congestion, sore throat, and fatigue. While in the PCP office he had abrupt onset of sharp chest pain located substernally, that lasted for over 20 min, w/ no exacerbating/relieving symptoms. Never occurred before, no radiation. No prior CVAs/MI/Stents.  ED Vitals: soft pressures, not requiring oxygen  He was found to have negative EKG, Trop. Cardiology was consulted who recommended continued troponin trending w/ possible stress testing in the morning.  He was given 325mg of ASA and 1 dose of Penicillin and admitted to the floors for ACS r/o.      Past Medical History:    Adhesive capsulitis    LUE    Atrial fibrillation (HCC)     Recently diagnosed atrial flutter// RBBB  OJSE guided CV 9/4/20     Cardiomyopathy (HCC)    )  DCM EF 25-30%  8/30  Tachy mediated ? EF 55-60% JOSE 9/4    Diabetic peripheral neuropathy associated with type 2 diabetes mellitus (HCC)    by exam and symptoms    Disc herniation    L5-S1 disc herniation after slipping on ice. This was medically managed    DJD (degenerative joint disease) of knee    bilat-received Synvisc-One injections to both knees    Hypertension    Hypertension associated with diabetes (HCC)    Mixed hyperlipidemia    Morbid obesity (HCC)    Osteoarthritis, localized, knee    left    Renal insufficiency, mild    Was hospitalized due to dehydration and had acute renal insufficiency (creatinine 1.85)    Shingles    Sleep apnea, obstructive    HTR as of 4-16-12    Type II or unspecified type diabetes mellitus without mention of complication, uncontrolled    Type 2 DM      Past Surgical History:   Procedure Laterality Date    Other surgical history      Root canals        ALL:  Allergies    Allergen Reactions    Erythromycin      Pt states felt \"tingling & weird\"        Prior to Admission Medications   Medication Sig    UNKNOWN TO PATIENT - DIABETIC [employer]    gabapentin 100 MG Oral Cap Take 1 capsule (100 mg total) by mouth 3 (three) times daily.    simvastatin 40 MG Oral Tab Take 1 tablet (40 mg total) by mouth nightly.    DULoxetine 60 MG Oral Cap DR Particles Take 1 capsule (60 mg total) by mouth daily.    metoprolol tartrate 25 MG Oral Tab Take 1 tablet (25 mg total) by mouth 2 (two) times daily.    SITagliptin-metFORMIN HCl (JANUMET)  MG Oral Tab TAKE ONE TABLET BY MOUTH TWO TIMES A DAY WITH MEALS    apixaban 5 MG Oral Tab Take 1 tablet (5 mg total) by mouth 2 (two) times daily.    Insulin Glargine-yfgn (SEMGLEE, YFGN,) 100 UNIT/ML Subcutaneous Solution Pen-injector Inject 70 Units into the skin daily.    insulin glargine (LANTUS SOLOSTAR) 100 UNIT/ML Subcutaneous Solution Pen-injector Inject 60 Units into the skin every morning.    Insulin Aspart Pen (NOVOLOG FLEXPEN) 100 UNIT/ML Subcutaneous Solution Pen-injector Inject 15-20 Units into the skin daily.    predniSONE 20 MG Oral Tab Take 1 tablet daily    Sodium Zirconium Cyclosilicate (LOKELMA) 10 g Oral Powd Pack Take 1 packet (10 g total) by mouth daily. This was ordered by Dr. Gan on 7/9/21.  See 8/4/21 result note regarding starting Rx.    Insulin Pen Needle 32G X 4 MM Does not apply Misc Use daily    Insulin Pen Needle (BD PEN NEEDLE MINI U/F) 31G X 5 MM Does not apply Misc Use twice daily    magnesium 250 MG Oral Tab Take 1.5 tablets (375 mg total) by mouth 2 (two) times a day. Take 400 mg twice daily.    Glucose Blood (CONTOUR NEXT TEST) In Vitro Strip To test up to 4 times daily.       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    Heart Disorder Mother         CAD with MI requiring CABG - passed away from respiratory failure 2 weeks later    Diabetes Mother         Type 2 DM    Diabetes Sister         Type 2 DM    Other (Other) Brother         Smoker, ETOH, hepatitis C    Other (Other) Sister         Born blind    Other (Other) Brother         Smoker    Thyroid Disorder Neg        OBJECTIVE:  /53   Pulse 74   Temp 97.2 °F (36.2 °C) (Temporal)   Resp 15   Ht 5' 11\" (1.803 m)   Wt (!) 350 lb (158.8 kg)   SpO2 100%   BMI 48.82 kg/m²   Gen: No acute distress, alert and oriented x3, no focal neurologic deficits  Pulm: Lungs clear bilaterally, normal respiratory effort  CV: Irregularly irregular, rate controlled, no murmur  Abd: Abdomen soft, nontender, non-distended, obese  Skin: chronic LE skin changes noted  Extremities: no edema noted  Neuro:  Grossly intact, no focal deficits      Data Review:    Pertinent Labs:  Cr of 1.76 (baseline GFR in the 40s)  Trop: negative  D-Dimer: negative    Imaging Studies:  CXR: negative    Assessment/Plan:   Outpatient records or previous hospital records reviewed.   [facility] hospitalist to continue to follow patient while in house    A/P: 63 y/o M w/ PMHx as listed above who presented from PCP office w/ complaints of abrupt onset chest pain. Admitted for ACS r/o.    Chest Pain  Heart Score: >4  Last ECHO: JOSE 2020 55-60%   EKG: non-ischemic, Trop: negative x1  Plan:  - Repeat ECHO ordered  - Continue Statin  - Repeat Trop ordered  - NPO at MN in case of need for stress testing, will need Lexiscan if trop elevated    AF  - Eliquis  - Lopressor    T2DM  - Will start w/ 25u of Glargine, 5u Lispro TID, HDISS  - Dose reduction from PTA meds as patient is expected to be NPO  - Continue Gabapentin    MDD/ELIAS  - Cymbalta    CKD3  - Daily labs     Strep Pharyngitis  Plan:  - Start Amoxicillin for 7-10 days    COVID19  Not on oxygen  Plan:  - Supportive  care    A total of 42 minutes taken with patient and coordinating care.  Greater than 50% face to face encounter.      Walker Singh D.O.  [facility] Hospitalist

## 2024-08-15 NOTE — PHYSICAL THERAPY NOTE
PHYSICAL THERAPY EVALUATION - INPATIENT     Room Number: 8627/8627-A  Evaluation Date: 8/15/2024  Type of Evaluation: Initial  Physician Order: PT Eval and Treat    Presenting Problem: covid, chest pain  Co-Morbidities : afib, OA, cardiomyopathy, HTN, DM, neuropathy  Reason for Therapy: Mobility Dysfunction and Discharge Planning    PHYSICAL THERAPY ASSESSMENT   Patient is a 64 year old male admitted 8/14/2024 for covid and chest pain.   Patient is currently functioning near baseline with bed mobility, transfers, and gait. Prior to admission, patient's baseline is mod ind with transfers to electric w/c and very short distance ambulation with RW.     Patient will benefit from continued skilled PT Services at discharge to promote prior level of function.  Anticipate patient will return home with home health PT.    PLAN  Patient has been evaluated and presents with no skilled Physical Therapy needs at this time.  Patient discharged from Physical Therapy services.  Please re-order if a new functional limitation presents during this admission.    GOALS  Patient was able to achieve the following goals ...    Patient was able to transfer At previous, functional level   Patient able to ambulate on level surfaces At previous, functional level         HOME SITUATION  Type of Home: Apartment   Home Layout: One level                Lives With: Spouse     Patient Owned Equipment: Rolling walker (electric w/c, hospital bed)       Prior Level of [place]: Pt reports he is mod ind with ADLs. Pt reports occasional falls in the bathroom when stepping over the tub edge. Pt transfers mod ind to electric w/c and can do very short distance ambulation with RW.     SUBJECTIVE  \"I do okay\"       OBJECTIVE  Precautions: Bed/chair alarm  Fall Risk: High fall risk    WEIGHT BEARING RESTRICTION  Weight Bearing Restriction: None                PAIN ASSESSMENT  Rating:  (did not c/o pain)          COGNITION  Overall Cognitive Status:  WFL -  within functional limits    RANGE OF MOTION AND STRENGTH ASSESSMENT  Upper extremity ROM and strength are within functional limits     Lower extremity ROM is within functional limits     Lower extremity strength is within functional limits       BALANCE  Static Sitting: Good  Dynamic Sitting: Fair +  Static Standing: Fair -  Dynamic Standing: Poor +    ADDITIONAL TESTS                                    ACTIVITY TOLERANCE                         O2 WALK       NEUROLOGICAL FINDINGS                        AM-PAC '6-Clicks' INPATIENT SHORT FORM - BASIC MOBILITY  How much difficulty does the patient currently have...  Patient Difficulty: Turning over in bed (including adjusting bedclothes, sheets and blankets)?: None   Patient Difficulty: Sitting down on and standing up from a chair with arms (e.g., wheelchair, bedside commode, etc.): A Little   Patient Difficulty: Moving from lying on back to sitting on the side of the bed?: None   How much help from another person does the patient currently need...   Help from Another: Moving to and from a bed to a chair (including a wheelchair)?: A Little   Help from Another: Need to walk in hospital room?: A Little   Help from Another: Climbing 3-5 steps with a railing?: Total       AM-PAC Score:  Raw Score: 18   Approx Degree of Impairment: 46.58%   Standardized Score (AM-PAC Scale): 43.63   CMS Modifier (G-Code): CK    FUNCTIONAL ABILITY STATUS  Gait Assessment   Functional Mobility/Gait Assessment  Gait Assistance: Contact guard assist  Distance (ft):  (several side steps toward HOB)  Assistive Device: Rolling walker  Pattern: Shuffle    Skilled Therapy Provided: Per RN rajni to work with pt. Pt received in supine and was agreeable to PT session.     Bed Mobility:  Rolling: NT  Supine to sit: mod ind with use of bed features (pt has hospital bed at home)   Sit to supine: mod ind      Transfer Mobility:  Sit to stand: supervision   Stand to sit: supervision  Gait = pt took several  steps with RW and CGA towards HOB    Therapist's comments:Pt educated on role of therapy, goals for session, safety, fall prevention, and activity recommendations.     Exercise/Education Provided:  Bed mobility  Energy conservation  Functional activity tolerated  Gait training  Posture  Strengthening  Transfer training    Patient End of Session: In bed;Needs met;Call light within reach;RN aware of session/findings;All patient questions and concerns addressed;Alarm set;Discussed recommendations with /    Patient Evaluation Complexity Level:  History Moderate - 1 or 2 personal factors and/or co-morbidities   Examination of body systems Low -  addressing 1-2 elements   Clinical Presentation Low- Stable   Clinical Decision Making Low Complexity       PT Session Time: 25 minutes  Therapeutic Activity: 8 minutes

## 2024-08-15 NOTE — PLAN OF CARE
Assumed care of patient at 2130. Patient is alert and oriented x4. Patient is on room air. Per patient he has Cpap at home but patient does not want to use it.  NSR on tele. No pain currently reported. Denied CP, denied any cardiac symptoms. Dry cough noted.   Both lower leg skin dry, scaling, flaky, and discoloration. No open wound. Skin checked PCT Coco  X1 with walker for short distance  Wheelchair use at home per patient  Discussed plan of care with patient. All of patient's questions answered at this time. Safety precaution in place. Call light within reach  (+) Covid, isolation ordered per protocol  [place] card consult completed     POC:  Echo  NPO  Lexiscan if trop elevate  LR 100CC  PT/OT to see      Problem: CARDIOVASCULAR - ADULT  Goal: Maintains optimal cardiac output and hemodynamic stability  Description: INTERVENTIONS:  - Monitor vital signs, rhythm, and trends  - Monitor for bleeding, hypotension and signs of decreased cardiac output  - Evaluate effectiveness of vasoactive medications to optimize hemodynamic stability  - Monitor arterial and/or venous puncture sites for bleeding and/or hematoma  - Assess quality of pulses, skin color and temperature  - Assess for signs of decreased coronary artery perfusion - ex. Angina  - Evaluate fluid balance, assess for edema, trend weights  Outcome: Progressing  Goal: Absence of cardiac arrhythmias or at baseline  Description: INTERVENTIONS:  - Continuous cardiac monitoring, monitor vital signs, obtain 12 lead EKG if indicated  - Evaluate effectiveness of antiarrhythmic and heart rate control medications as ordered  - Initiate emergency measures for life threatening arrhythmias  - Monitor electrolytes and administer replacement therapy as ordered  Outcome: Progressing     Problem: Patient/Family Goals  Goal: Patient/Family Long Term Goal  Description: Patient's Long Term Goal: stay out of Hospital    Interventions:  - Follow up MD  -Compliant taking medication,  healthy diet  - See additional Care Plan goals for specific interventions  Outcome: Progressing  Goal: Patient/Family Short Term Goal  Description: Patient's Short Term Goal: go home    Interventions:   - MD rounding  -Card to see  -Echo    - See additional Care Plan goals for specific interventions  Outcome: Progressing     Problem: PAIN - ADULT  Goal: Verbalizes/displays adequate comfort level or patient's stated pain goal  Description: INTERVENTIONS:  - Encourage pt to monitor pain and request assistance  - Assess pain using appropriate pain scale  - Administer analgesics based on type and severity of pain and evaluate response  - Implement non-pharmacological measures as appropriate and evaluate response  - Consider cultural and social influences on pain and pain management  - Manage/alleviate anxiety  - Utilize distraction and/or relaxation techniques  - Monitor for opioid side effects  - Notify MD/LIP if interventions unsuccessful or patient reports new pain  - Anticipate increased pain with activity and pre-medicate as appropriate  Outcome: Progressing     Problem: SAFETY ADULT - FALL  Goal: Free from fall injury  Description: INTERVENTIONS:  - Assess pt frequently for physical needs  - Identify cognitive and physical deficits and behaviors that affect risk of falls.  - [place] fall precautions as indicated by assessment.  - Educate pt/family on patient safety including physical limitations  - Instruct pt to call for assistance with activity based on assessment  - Modify environment to reduce risk of injury  - Provide assistive devices as appropriate  - Consider OT/PT consult to assist with strengthening/mobility  - Encourage toileting schedule  Outcome: Progressing

## 2024-08-15 NOTE — CM/SW NOTE
Patient admitted yesterday via bls from doctor's office with chest pain.  He tested + with covid.  Pt usually gets around with motorized scooter and pace bus.  Scooter left @ doctor's office--pt/family unable to transport as too large for cars--pt gets around with PACE bus.  He attempted to arrange --no times available today.  No family able to pick pt up--offered medivan (cost)--per policy, with + covid--ambulance and not medivan.  PCS form completed.  Pt to be picked up @ 1600

## 2024-08-15 NOTE — RESPIRATORY THERAPY NOTE
PATIENT HAS HISTORY OF [facility]. PATIENT REFUSES TO USE CPAP DURING HOSPITAL STAY. [facility] PROTOCOL IN CHART.

## 2024-08-15 NOTE — OCCUPATIONAL THERAPY NOTE
OCCUPATIONAL THERAPY EVALUATION - INPATIENT    Room Number: 8627/8627-A  Evaluation Date: 8/15/2024     Type of Evaluation: Initial  Presenting Problem: chest pain, COVID    Physician Order: IP Consult to Occupational Therapy  Reason for Therapy:  ADL/IADL Dysfunction and Discharge Planning      OCCUPATIONAL THERAPY ASSESSMENT   Patient is a 64 year old male admitted on 8/14/2024 with Presenting Problem: chest pain, COVID. Co-Morbidities : afib, OA, cardiomyopathy, HTN, DM, neuropathy  Patient is currently functioning near baseline with toileting, upper body dressing, lower body dressing, grooming, bed mobility, transfers, static sitting balance, dynamic sitting balance, static standing balance, dynamic standing balance, and maintaining seated position.  Prior to admission, patient's baseline is use of motor scooter for mobility and CGA for t/fs and extensive use of adaptive equipment for ADLs.  Patient met all OT goals at baseline level.  Patient reports no further questions/concerns at this time.     Patient will benefit from continued skilled OT Services at discharge to promote prior level of function and safety with additional support and return home with home health OT      WEIGHT BEARING RESTRICTION  Weight Bearing Restriction: None                Recommendations for nursing staff:   Transfers: CGA  Toileting location: Toilet    EVALUATION SESSION:  Patient at start of session: supine in bed for session  FUNCTIONAL TRANSFER ASSESSMENT  Sit to Stand: Edge of Bed  Edge of Bed: Supervision (from elevated bed)    BED MOBILITY  Rolling: Supervision  Supine to Sit : Supervision  Scooting: Sup to EOB    BALANCE ASSESSMENT  Static Sitting: Supervision  Sitting Bilateral: Supervision  Static Standing: Supervision  Standing Bilateral: Contact Guard Assist (to amb up and down HOB to simulate functional t/fs)    FUNCTIONAL ADL ASSESSMENT  LB Dressing Seated: Contact Guard Assist (to hali socks with use of adaptive devices  as at home)      ACTIVITY TOLERANCE: vitals stable                         O2 SATURATIONS       COGNITION  Overall Cognitive Status:  WFL - within functional limits  COGNITION ASSESSMENTS       Upper Extremity:   ROM: within functional limits   Strength: is within functional limits   Coordination:  Gross motor: WNL  Fine motor: WNL  Sensation: Light touch:  intact    EDUCATION PROVIDED  Patient: Role of Occupational Therapy; Plan of Care  Patient's Response to Education: Verbalized Understanding; Returned Demonstration    Equipment used: RW  Demonstrates functional use    Patient End of Session: Needs met;All patient questions and concerns addressed;Alarm set    OCCUPATIONAL PROFILE    HOME SITUATION  Type of Home: Apartment  Home Layout: One level  Lives With: Spouse    Toilet and Equipment: Standard height toilet  Shower/Tub and Equipment: Walk-in shower  Other Equipment: None    Occupation/Status: retired  Hand Dominance: Right          Prior Level of Function: Prior to admission, patient's baseline is use of motor scooter for mobility and CGA for t/fs and extensive use of adaptive equipment for ADLs.    SUBJECTIVE  Pt stated, \"I feel like I am doing okay, I just need my scooter.\"    PAIN ASSESSMENT  Ratin  Location: no pain at this time       OBJECTIVE  Precautions: Bed/chair alarm  Fall Risk: High fall risk    WEIGHT BEARING RESTRICTION  Weight Bearing Restriction: None                AM-PAC ‘6-Clicks’ Inpatient Daily Activity Short Form  -   Putting on and taking off regular lower body clothing?: A Little  -   Bathing (including washing, rinsing, drying)?: A Little  -   Toileting, which includes using toilet, bedpan or urinal? : A Little  -   Putting on and taking off regular upper body clothing?: A Little  -   Taking care of personal grooming such as brushing teeth?: A Little  -   Eating meals?: A Little    AM-PAC Score:  Score: 18  Approx Degree of Impairment: 46.65%  Standardized Score (AM-PAC Scale):  38.66      ADDITIONAL TESTS     NEUROLOGICAL FINDINGS        PLAN   Patient has been evaluated and presents with no skilled Occupational Therapy needs at this time.  Patient discharged from Occupational Therapy services.  Please re-order if a new functional limitation presents during this admission.      Patient Evaluation Complexity Level:   Occupational Profile/Medical History LOW - Brief history including review of medical or therapy records    Specific performance deficits impacting engagement in ADL/IADL LOW  1 - 3 performance deficits    Client Assessment/Performance Deficits LOW - No comorbidities nor modifications of tasks    Clinical Decision Making LOW - Analysis of occupational profile, problem-focused assessments, limited treatment options    Overall Complexity LOW     OT Session Time: 25 minutes  Self-Care Home Management: 15 minutes  Therapeutic Activity: 0 minutes  Neuromuscular Re-education: 0 minutes  Therapeutic Exercise: 0 minutes  Cognitive Skills: 0 minutes  Sensory Integrative: 0 minutes  Orthotic Management and Trainin minutes  Can add/delete any of these

## 2024-08-15 NOTE — DISCHARGE SUMMARY
[facility] Internal Medicine Hospitalist Discharge Summary     Patient ID:  Tj Draper  64 year old  7/19/1960    Admit date: 8/14/2024    Discharge date and time: 8/15/24    Attending Physician: Walker Singh DO     Primary Care Physician: Adams Sam MD     Discharge Diagnoses: COVID19, Acute Strep Pharyngitis, Non-Cardiac Chest Pain    Please note that only [facility] DMG and EMG patients enrolled in the Medicare ACO, [facility] ACO and [facility] HMOs will be handled by the [facility] Care Management team.  For all other patients, please follow usual protocol for discharge care transition.    Discharge Condition: stable    Disposition:  Home    Important Follow up:  - PCP: 1-2 weeks    Follow Up Items:    Hospital Course:        65 y/o M w/ PMHx of HTN, HLD, Morbid Obesity, T2DM, CKD3, AF, MDD, OA, DJD who presented from PCP office w/ complaints of abrupt onset chest pain. Admitted for ACS r/o. He had 3 Troponins drawn throughout his stay, all negative. He was chest pain free. ECHO did not show any wall motion abnormalities. Discharge home.    Consults: IP CONSULT TO CARDIOLOGY  IP CONSULT TO HOSPITALIST    Operative Procedures:        Patient instructions:      I as the attending physician reconciled the current and discharge medications on day of discharge.     Current Discharge Medication List        START taking these medications    Details   amoxicillin 500 MG Oral Cap Take 1 capsule (500 mg total) by mouth every 12 (twelve) hours for 9 days.           CONTINUE these medications which have NOT CHANGED    Details   Tirzepatide (MOUNJARO) 12.5 MG/0.5ML Subcutaneous Solution Pen-injector Inject 12.5 mg into the skin once a week.      torsemide 10 MG Oral Tab Take 1 tablet (10 mg total) by mouth daily.      Insulin Glargine, 2 Unit Dial, (TOUJEO MAX SOLOSTAR) 300 UNIT/ML Subcutaneous Solution Pen-injector Inject 80 Units into the skin every morning.      insulin aspart  (NOVOLOG FLEXPEN) 100 Units/mL Subcutaneous Solution Pen-injector Inject 42 units into the skin before breakfast and 20-25 units before dinner      gabapentin 300 MG Oral Cap Take 1 capsule (300 mg total) by mouth 2 (two) times daily.      simvastatin 40 MG Oral Tab Take 1 tablet (40 mg total) by mouth nightly.      DULoxetine 60 MG Oral Cap DR Particles Take 1 capsule (60 mg total) by mouth daily.      metoprolol tartrate 25 MG Oral Tab Take 1 tablet (25 mg total) by mouth 2 (two) times daily.      apixaban 5 MG Oral Tab Take 1 tablet (5 mg total) by mouth 2 (two) times daily.      Sodium Zirconium Cyclosilicate (LOKELMA) 10 g Oral Powd Pack Take 1 packet (10 g total) by mouth 3 (three) times a week. on Monday, Wednesday, and Friday      magnesium 250 MG Oral Tab Take 1 tablet (250 mg total) by mouth 2 (two) times daily.      !! Insulin Pen Needle 32G X 4 MM Does not apply Misc Use daily      !! Insulin Pen Needle (BD PEN NEEDLE MINI U/F) 31G X 5 MM Does not apply Misc Use twice daily      Glucose Blood (CONTOUR NEXT TEST) In Vitro Strip To test up to 4 times daily.       !! - Potential duplicate medications found. Please discuss with provider.          Activity: activity as tolerated  Diet: cardiac diet  Wound Care: as directed  Code Status: Full Code      Exam on day of discharge:     Vitals:    08/15/24 1213   BP: 113/60   Pulse: 75   Resp: 21   Temp: 98.3 °F (36.8 °C)       General: no acute distress, AAOx3  Heart: RRR  Lungs: clear bilaterally, no active wheezing  Abdomen: nontender, nondistended, intact BS  Extremities: no pedal edema, chronic skin changes appreciated  Neuro: CN inact, no focal deficits      Total time coordinating care for discharge: Greater than 30 minutes    KARISSA Smyth [facility] System[facility]

## 2024-08-15 NOTE — PROGRESS NOTES
08/14/24 2148 08/14/24 2152 08/14/24 2155   Vital Signs   Temp 98.5 °F (36.9 °C)  --   --    Temp src Oral  --   --    Pulse 79 79 87   Heart Rate Source Monitor  --   --    Resp 21  --   --    Respiratory Quality Normal  --   --    /60 134/71 115/70   MAP (mmHg) 81 88 85   BP Location Left arm Left arm Left arm   BP Method Automatic Automatic Automatic   Patient Position Lying Sitting Standing

## 2024-08-15 NOTE — PROGRESS NOTES
Reviewed discharge plan of care with patient. Verbalized understanding. This RN called spouse to update discharge plan of care. IV removed. Tele removed. All questions answered. Ambulance as discharge transportation. Ambulance made aware of patient transportation and to get patient into bed as family/ patient requested.

## 2024-08-15 NOTE — PROGRESS NOTES
[facility] Note:  Adjustment for amoxicillin    Tj Draper is a 64 year old patient who has been prescribed amoxicillin 1,000 mg every 12 hrs.  The estimated creatinine clearance is 48.7 mL/min (A) (based on SCr of 1.61 mg/dL (H)). The dose has been adjusted to amoxicillin 500 mg every 12 hrs per hospital renal dose adjustment protocol for treatment of  strep pharyngitis .      Thank you,    Trixie Brennan, PharmD  8/15/2024  7:41 AM

## 2024-08-15 NOTE — PLAN OF CARE
Acquired patient around 0730. Alert and oriented x4. On Ra. Spo2 within normal limits. NSR on tele. Echo today. PO abx. Continent of bowel and bladder. Call light within reach .Contue plan of care.     Problem: CARDIOVASCULAR - ADULT  Goal: Maintains optimal cardiac output and hemodynamic stability  Description: INTERVENTIONS:  - Monitor vital signs, rhythm, and trends  - Monitor for bleeding, hypotension and signs of decreased cardiac output  - Evaluate effectiveness of vasoactive medications to optimize hemodynamic stability  - Monitor arterial and/or venous puncture sites for bleeding and/or hematoma  - Assess quality of pulses, skin color and temperature  - Assess for signs of decreased coronary artery perfusion - ex. Angina  - Evaluate fluid balance, assess for edema, trend weights  Outcome: Progressing  Goal: Absence of cardiac arrhythmias or at baseline  Description: INTERVENTIONS:  - Continuous cardiac monitoring, monitor vital signs, obtain 12 lead EKG if indicated  - Evaluate effectiveness of antiarrhythmic and heart rate control medications as ordered  - Initiate emergency measures for life threatening arrhythmias  - Monitor electrolytes and administer replacement therapy as ordered  Outcome: Progressing     Problem: Patient/Family Goals  Goal: Patient/Family Long Term Goal  Description: Patient's Long Term Goal: stay out of Hospital    Interventions:  - Follow up MD  -Compliant taking medication, healthy diet  - See additional Care Plan goals for specific interventions  Outcome: Progressing  Goal: Patient/Family Short Term Goal  Description: Patient's Short Term Goal: go home    Interventions:   - MD rounding  -Card to see  -Echo    - See additional Care Plan goals for specific interventions  Outcome: Progressing     Problem: PAIN - ADULT  Goal: Verbalizes/displays adequate comfort level or patient's stated pain goal  Description: INTERVENTIONS:  - Encourage pt to monitor pain and request assistance  -  Assess pain using appropriate pain scale  - Administer analgesics based on type and severity of pain and evaluate response  - Implement non-pharmacological measures as appropriate and evaluate response  - Consider cultural and social influences on pain and pain management  - Manage/alleviate anxiety  - Utilize distraction and/or relaxation techniques  - Monitor for opioid side effects  - Notify MD/LIP if interventions unsuccessful or patient reports new pain  - Anticipate increased pain with activity and pre-medicate as appropriate  Outcome: Progressing     Problem: SAFETY ADULT - FALL  Goal: Free from fall injury  Description: INTERVENTIONS:  - Assess pt frequently for physical needs  - Identify cognitive and physical deficits and behaviors that affect risk of falls.  - [place] fall precautions as indicated by assessment.  - Educate pt/family on patient safety including physical limitations  - Instruct pt to call for assistance with activity based on assessment  - Modify environment to reduce risk of injury  - Provide assistive devices as appropriate  - Consider OT/PT consult to assist with strengthening/mobility  - Encourage toileting schedule  Outcome: Progressing

## 2024-08-15 NOTE — DISCHARGE INSTRUCTIONS
Mr. Draper,    Thank you for allowing us to take care of your health during your admission at [facility]. You are stable for discharge at this time. Your diagnoses and specific instructions for your medications are listed below. Please ensure you follow up with your PCP in 1-2 weeks on discharge and all other follow up appointments listed below.    Your ECHOcardiogram was: normal and infact improved from previous     Diagnoses: COVID19, Strep Pharyngitis, Non-Cardiac Chest Pain    Changes to Medications:  - Start Amoxicillin 1 tablet twice a day for 9 more days  - Isolate for 5-10 days depending on symptoms/cough/fever at home    Follow Ups:  - Cardiology follow up in 3-4 weeks  - PCP Follow up in 1-2 weeks    Best Wishes and Good Walker Zayas D.O.  [facility] Emergencyist

## 2024-08-16 NOTE — CONSULTS
[facility] Cardiology  Consultation Note      Tj Draper Patient Status:  Observation    1960 MRN [id number]   Location [facility] 8NE-A Attending No att. providers found   Hosp Day # 0 PCP Adams Sam MD     Impression:  1. chest pain, atypical--> non-ACS/cardiac.  2. COVID 19 infection  3. DM2 complicated by CKD3  4. pAF    Recommendations:  - CP: negative ACS work-up. atypical presentation.  TTE reviewed- LVEF 60-65% nl. Defer ischemic testing at this time. Supportive COVID care.  Reasonable for dc from cv standpoint.        History of Present Illness:  Tj Draper is a 64 year old male who presented to [facility] on 2024.    Seen in cardiology consultation for chest pain. Established pt of Dr. Short, hx of DCM with recovered LVEF (25-30%-->55-60%), AF s/p DCCV (), DM2/CKD3, HTN/HL, obesity.  Presented from PCP's office with an episode of diffuse/sharp chest pain.  Persisted for 20 minutes, then gradually subsided.  ECG SR without ischemic changes, serial troponins negative.  +COVID screening , CXR clear, maintaining sats on RA.  Symptoms significant improved today.      Medications:  No current facility-administered medications for this encounter.       Past Medical History:    Adhesive capsulitis    LUE    Atrial fibrillation (HCC)     Recently diagnosed atrial flutter// RBBB  JOSE guided CV 20     Cardiomyopathy (HCC)    )  DCM EF 25-30%    Tachy mediated ? EF 55-60% JOSE     Diabetic peripheral neuropathy associated with type 2 diabetes mellitus (HCC)    by exam and symptoms    Disc herniation    L5-S1 disc herniation after slipping on ice. This was medically managed    DJD (degenerative joint disease) of knee    bilat-received Synvisc-One injections to both knees    Hypertension    Hypertension associated with diabetes (HCC)    Mixed hyperlipidemia    Morbid obesity (HCC)    Osteoarthritis, localized, knee    left    Renal insufficiency, mild    Was  hospitalized due to dehydration and had acute renal insufficiency (creatinine 1.85)    Shingles    Sleep apnea, obstructive    HTR as of 12    Type II or unspecified type diabetes mellitus without mention of complication, uncontrolled    Type 2 DM       Past Surgical History:   Procedure Laterality Date    Other surgical history      Root canals       Family History  family history includes Cancer in his father; Diabetes in his mother and sister; Heart Disorder in his mother; Other in his brother, brother, father, and sister.    Social History   reports that he has never smoked. He has never used smokeless tobacco. He reports that he does not drink alcohol and does not use drugs.     Allergies  Allergies   Allergen Reactions    Erythromycin      Pt states felt \"tingling & weird\"         Review of Systems:  Constitutional: negative for fevers  Eyes: negative for visual disturbance  Ears, nose, mouth, throat, and face: negative for epistaxis  Respiratory: negative for dyspnea on exertion  Cardiovascular: negative for chest pain  Gastrointestinal: negative for melena  Genitourinary:negative for hematuria  Hematologic/lymphatic: negative for bleeding  Musculoskeletal:negative for myalgias  Neurological: negative for dizziness and headaches  Endocrine: negative for temperature intolerance      Physical Exam:  Blood pressure 113/60, pulse 75, temperature 98.3 °F (36.8 °C), temperature source Oral, resp. rate 21, height 5' 11\" (1.803 m), weight (!) 384 lb 6.4 oz (174.4 kg), SpO2 98%.  Temp (24hrs), Av.3 °F (36.8 °C), Min:98.3 °F (36.8 °C), Max:98.3 °F (36.8 °C)    Wt Readings from Last 3 Encounters:   24 (!) 384 lb 6.4 oz (174.4 kg)   23 (!) 375 lb (170.1 kg)   23 250 lb (113.4 kg)       General: Awake and alert; in no acute distress  HEENT: Extraocular movements are intact; sclerae are anicteric; scalp is atrauamatic; no thyromegaly  Neck: Supple; no JVD; no carotid bruits  Cardiac: Regular rate  and regular rhythm; no murmurs/rubs/gallops are appreciated; PMI is non-displaced; there is no evidence of a sternal heave  Lungs: Clear to auscultation bilaterally; no accessory muscle use is noted  Abdomen: Soft, non-tender; bowel sounds are normoactive; no hepatosplenomegaly  Extremities: No clubbing or cyanosis; moves all 4 extremities normally  Psychiatric: Normal mood and affect; answers questions appropriately  Dermatologic: No rashes; normal skin turgor    Diagnostic testing:    EKG: Normal sinus rhythm    Labs:   Lab Results   Component Value Date    INR 1.34 (H) 07/09/2021        Lab Results   Component Value Date    PGLU 165 08/15/2024         Thank you for allowing our practice to participate in the care of your patient. Please do not hesitate to contact me if you have any questions.    Aniceto Leyva MD  8/16/2024  11:41 AM

## 2024-09-05 ENCOUNTER — LAB ENCOUNTER (OUTPATIENT)
Dept: LAB | Facility: HOSPITAL | Age: 64
End: 2024-09-05
Attending: NURSE PRACTITIONER
Payer: COMMERCIAL

## 2024-09-05 DIAGNOSIS — R80.9 PROTEINURIA: ICD-10-CM

## 2024-09-05 DIAGNOSIS — N18.32 CHRONIC KIDNEY DISEASE (CKD) STAGE G3B/A1, MODERATELY DECREASED GLOMERULAR FILTRATION RATE (GFR) BETWEEN 30-44 ML/MIN/1.73 SQUARE METER AND ALBUMINURIA CREATININE RATIO LESS THAN 30 MG/G (HCC): ICD-10-CM

## 2024-09-05 DIAGNOSIS — M89.9 BONE DISEASE: ICD-10-CM

## 2024-09-05 DIAGNOSIS — N18.9 CHRONIC KIDNEY DISEASE, UNSPECIFIED: ICD-10-CM

## 2024-09-05 DIAGNOSIS — E11.65 INADEQUATELY CONTROLLED DIABETES MELLITUS (HCC): Primary | ICD-10-CM

## 2024-09-05 DIAGNOSIS — E83.9 DISORDER OF MINERAL METABOLISM: ICD-10-CM

## 2024-09-05 LAB
ALBUMIN SERPL-MCNC: 4.8 G/DL (ref 3.2–4.8)
ANION GAP SERPL CALC-SCNC: 8 MMOL/L (ref 0–18)
BILIRUB UR QL STRIP.AUTO: NEGATIVE
BUN BLD-MCNC: 31 MG/DL (ref 9–23)
CALCIUM BLD-MCNC: 10.8 MG/DL (ref 8.7–10.4)
CHLORIDE SERPL-SCNC: 101 MMOL/L (ref 98–112)
CHOLEST SERPL-MCNC: 134 MG/DL (ref ?–200)
CLARITY UR REFRACT.AUTO: CLEAR
CO2 SERPL-SCNC: 31 MMOL/L (ref 21–32)
CREAT BLD-MCNC: 1.59 MG/DL
CREAT UR-SCNC: 65.3 MG/DL
CREAT UR-SCNC: 65.3 MG/DL
EGFRCR SERPLBLD CKD-EPI 2021: 48 ML/MIN/1.73M2 (ref 60–?)
FASTING PATIENT LIPID ANSWER: YES
GLUCOSE BLD-MCNC: 207 MG/DL (ref 70–99)
GLUCOSE UR STRIP.AUTO-MCNC: NORMAL MG/DL
HDLC SERPL-MCNC: 39 MG/DL (ref 40–59)
KETONES UR STRIP.AUTO-MCNC: NEGATIVE MG/DL
LDLC SERPL CALC-MCNC: 65 MG/DL (ref ?–100)
LEUKOCYTE ESTERASE UR QL STRIP.AUTO: NEGATIVE
MICROALBUMIN UR-MCNC: 10.7 MG/DL
MICROALBUMIN/CREAT 24H UR-RTO: 163.9 UG/MG (ref ?–30)
NITRITE UR QL STRIP.AUTO: NEGATIVE
NONHDLC SERPL-MCNC: 95 MG/DL (ref ?–130)
OSMOLALITY SERPL CALC.SUM OF ELEC: 303 MOSM/KG (ref 275–295)
PH UR STRIP.AUTO: 6 [PH] (ref 5–8)
PHOSPHATE SERPL-MCNC: 4.8 MG/DL (ref 2.4–5.1)
POTASSIUM SERPL-SCNC: 4.5 MMOL/L (ref 3.5–5.1)
PROT UR-MCNC: 24.6 MG/DL (ref ?–14)
PTH-INTACT SERPL-MCNC: 41.7 PG/ML (ref 18.5–88)
RBC UR QL AUTO: NEGATIVE
SODIUM SERPL-SCNC: 140 MMOL/L (ref 136–145)
SP GR UR STRIP.AUTO: 1.01 (ref 1–1.03)
TRIGL SERPL-MCNC: 179 MG/DL (ref 30–149)
UROBILINOGEN UR STRIP.AUTO-MCNC: NORMAL MG/DL
VIT D+METAB SERPL-MCNC: 29.5 NG/ML (ref 30–100)
VLDLC SERPL CALC-MCNC: 27 MG/DL (ref 0–30)

## 2024-09-05 PROCEDURE — 82570 ASSAY OF URINE CREATININE: CPT

## 2024-09-05 PROCEDURE — 82306 VITAMIN D 25 HYDROXY: CPT

## 2024-09-05 PROCEDURE — 84156 ASSAY OF PROTEIN URINE: CPT

## 2024-09-05 PROCEDURE — 81003 URINALYSIS AUTO W/O SCOPE: CPT

## 2024-09-05 PROCEDURE — 80069 RENAL FUNCTION PANEL: CPT

## 2024-09-05 PROCEDURE — 82043 UR ALBUMIN QUANTITATIVE: CPT

## 2024-09-05 PROCEDURE — 83970 ASSAY OF PARATHORMONE: CPT

## 2024-09-05 PROCEDURE — 36415 COLL VENOUS BLD VENIPUNCTURE: CPT

## 2024-09-05 PROCEDURE — 80061 LIPID PANEL: CPT

## 2024-12-13 ENCOUNTER — OFFICE VISIT (OUTPATIENT)
Facility: LOCATION | Age: 64
End: 2024-12-13
Payer: MEDICARE

## 2024-12-13 DIAGNOSIS — H61.22 IMPACTED CERUMEN OF LEFT EAR: Primary | ICD-10-CM

## 2024-12-13 PROCEDURE — 69210 REMOVE IMPACTED EAR WAX UNI: CPT | Performed by: STUDENT IN AN ORGANIZED HEALTH CARE EDUCATION/TRAINING PROGRAM

## 2024-12-13 NOTE — PROGRESS NOTES
[facility]  OTOLARYNGOLOGY - HEAD & NECK SURGERY    12/13/2024     Reason for Consultation:   Left ear problem    History of Present Illness:   Patient is a pleasant 64 year old male who is being seen for a left ear issue which she has had over the past few months.  The patient states he has sensation of a sound in his left ear that similar to crinkling.  He states he also notices this when he pumps his tragus.  He states this has been bothering him at night as well.  He has not had this problem in the past.    Past Medical History  Past Medical History:    Adhesive capsulitis    LUE    Atrial fibrillation (HCC)     Recently diagnosed atrial flutter// RBBB  JOSE guided CV 9/4/20     Cardiomyopathy (HCC)    )  DCM EF 25-30%  8/30  Tachy mediated ? EF 55-60% JOSE 9/4    Diabetic peripheral neuropathy associated with type 2 diabetes mellitus ([facility])    by exam and symptoms    Disc herniation    L5-S1 disc herniation after slipping on ice. This was medically managed    DJD (degenerative joint disease) of knee    bilat-received Synvisc-One injections to both knees    Hypertension    Hypertension associated with diabetes (HCC)    Mixed hyperlipidemia    Morbid obesity (HCC)    Osteoarthritis, localized, knee    left    Renal insufficiency, mild    Was hospitalized due to dehydration and had acute renal insufficiency (creatinine 1.85)    Shingles    Sleep apnea, obstructive    HTR as of 4-16-12    Type II or unspecified type diabetes mellitus without mention of complication, uncontrolled    Type 2 DM       Past Surgical History  Past Surgical History:   Procedure Laterality Date    Other surgical history      Root canals       Family History  Family History   Problem Relation Age of Onset    Cancer Father         Mesothelioma    Other (Other) Father         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    Heart Disorder Mother         CAD with MI requiring CABG - passed away from respiratory failure 2 weeks later    Diabetes Mother          Type 2 DM    Diabetes Sister         Type 2 DM    Other (Other) Brother         Smoker, ETOH, hepatitis C    Other (Other) Sister         Born blind    Other (Other) Brother         Smoker    Thyroid Disorder Neg        Social History  Pediatric History   Patient Parents    Not on file     Other Topics Concern     Service Not Asked    Blood Transfusions Not Asked    Caffeine Concern Yes     Comment: soda     Occupational Exposure Not Asked    Hobby Hazards Not Asked    Sleep Concern Not Asked    Stress Concern Not Asked    Weight Concern Not Asked    Special Diet Not Asked    Back Care Not Asked    Exercise No    Bike Helmet Not Asked    Seat Belt Not Asked    Self-Exams Not Asked   Social History Narrative    Not on file           Current Medications:  Current Outpatient Medications   Medication Sig Dispense Refill    Tirzepatide (MOUNJARO) 12.5 MG/0.5ML Subcutaneous Solution Pen-injector Inject 12.5 mg into the skin once a week.      torsemide 10 MG Oral Tab Take 1 tablet (10 mg total) by mouth daily.      Insulin Glargine, 2 Unit Dial, (TOUJEO MAX SOLOSTAR) 300 UNIT/ML Subcutaneous Solution Pen-injector Inject 80 Units into the skin every morning.      insulin aspart (NOVOLOG FLEXPEN) 100 Units/mL Subcutaneous Solution Pen-injector Inject 42 units into the skin before breakfast and 20-25 units before dinner      gabapentin 300 MG Oral Cap Take 1 capsule (300 mg total) by mouth 2 (two) times daily.      simvastatin 40 MG Oral Tab Take 1 tablet (40 mg total) by mouth nightly. 90 tablet 1    DULoxetine 60 MG Oral Cap DR Particles Take 1 capsule (60 mg total) by mouth daily. 90 capsule 1    metoprolol tartrate 25 MG Oral Tab Take 1 tablet (25 mg total) by mouth 2 (two) times daily. 180 tablet 1    apixaban 5 MG Oral Tab Take 1 tablet (5 mg total) by mouth 2 (two) times daily. 180 tablet 3    Sodium Zirconium Cyclosilicate (LOKELMA) 10 g Oral Powd Pack Take 1 packet (10 g total) by mouth 3 (three) times  a week. on Monday, Wednesday, and Friday 90 each 3    Insulin Pen Needle 32G X 4 MM Does not apply Misc Use daily 100 each 3    Insulin Pen Needle (BD PEN NEEDLE MINI U/F) 31G X 5 MM Does not apply Misc Use twice daily 400 each 3    magnesium 250 MG Oral Tab Take 1 tablet (250 mg total) by mouth 2 (two) times daily. 30 tablet 0    Glucose Blood (CONTOUR NEXT TEST) In Vitro Strip To test up to 4 times daily. 400 strip 3       Allergies  Allergies[1]    Review of Systems:   A comprehensive 10 point review of systems was completed.  Pertinent positives and negatives noted in the the HPI.    Physical Exam:   There were no vitals taken for this visit.    GENERAL: No acute distress, Comfortable appearing  FACE: HB 1/6, Normal Animation  HEAD: Normocephalic  EYES: EOMI, pupils equil  EARS: Bilateral Auricles Symmetric  NOSE: Nares patent bilaterally  ORAL CAVITY: Tongue mobile, Oropharynx clear, Floor of mouth clear, Posterior oropharynx normal  NECK: No palpable lymphadenopathy, thyroid not palpable, nontender    OTOMICROSCOPY WITH CERUMEN REMOVAL    Canals:  Right: Canal clear  Left: Canal with cerumen preventing adequate view of TM, debrided with instrumentation as dictated below    Tympanic Membranes:  Right: Normal tympanic membrane.   Left: Normal tympanic membrane.     TM Visualized Method:   Right TM examined via otomicroscopy.    Left TM examined via otomicroscopy.      PROCEDURE: REMOVAL OF CERUMEN IMPACTION  The cerumen impaction was completely removed from the left ear canal using microscopy as necessary.   Removal was completed by using a currette and alligator      Results:     Laboratory Data:  Lab Results   Component Value Date    WBC 7.0 08/15/2024    HGB 13.1 08/15/2024    HCT 39.8 08/15/2024    .0 (L) 08/15/2024    CREATSERUM 1.59 (H) 09/05/2024    BUN 31 (H) 09/05/2024     09/05/2024    K 4.5 09/05/2024     09/05/2024    CO2 31.0 09/05/2024     (H) 09/05/2024    CA 10.8 (H)  09/05/2024    ALB 4.8 09/05/2024    ALKPHO 94 08/14/2024    TP 8.3 (H) 08/14/2024    AST 29 08/14/2024    ALT 14 08/14/2024    PTT 41.8 (H) 07/09/2021    INR 1.34 (H) 07/09/2021    PTP 17.0 (H) 07/09/2021    T4F 0.9 02/06/2024    TSH 5.210 (H) 02/06/2024    PSA 0.53 02/06/2024    DDIMER 0.64 08/14/2024    ESRML 28 (H) 08/26/2021    CRP 1.79 (H) 08/26/2021    MG 2.1 08/15/2024    PHOS 4.8 09/05/2024    TROP <0.045 08/30/2020     (H) 08/31/2020    POCGLU 422 (HH) 10/06/2013         Imaging:  No results found.      Impression:       ICD-10-CM    1. Impacted cerumen of left ear  H61.22            Recommendations:  The patient had hard cerumen down towards his tympanic membrane which was likely vibrating and causing his symptoms.  I completely debrided his ear canal and the patient felt immediately improved.  He will return to see me as needed.    Thank you for allowing me to participate in the care of your patient.    Leland Kelly, DO   Otolaryngology/Rhinology, Sinus, and Endoscopic Skull Base Surgery  [address] [place] [id number]  Phone [phone]  Fax [phone]  12/13/2024  5:02 PM  12/13/2024          [1]   Allergies  Allergen Reactions    Erythromycin      Pt states felt \"tingling & weird\"

## (undated) NOTE — LETTER
11/13/2017        I would like to refer you Lowell Mustafa. 07/19/1960    Referring Provider: Lori Leon MD    Fax: [phone]    As soon as the patient is seen please complete the form below and fax to the referring provider without a cover sheet.

## (undated) NOTE — LETTER
[facility] [address], [address] [place] [address]    Consent for Anesthesia   1.   Levar GONZALEZ agree to be cared for by a physician anesthesiologist alone and/or with a nurse anesthetist, who is specially trained to monitor me and give me med allergic reactions to medications, injury to my airway, heart, lungs, vision, nerves, or muscles and in extremely rare instances death. 5. My doctor has explained to me other choices available to me for my care (alternatives).   6. Pregnant Patients (“epid Printed: 9/3/2020 at 1:20 PM    Medical Record #: [id number]                                            Page 1 of 1

## (undated) NOTE — MR AVS SNAPSHOT
[address]   [address]  [facility] [address] [phone]               Thank you for choosing us for your health care visit with Abi Blanco DO.   We are glad to serve you and happy to provide you with th authorization, such as [place], please feel free to schedule your appointment immediately. However, if you are unsure about the requirements for authorization, please wait 5-7 days and then contact your physician's   office.  At that time, you gaurav Commonly known as:  PRINIVIL,ZESTRIL           Meloxicam 15 MG Tabs   TAKE 1 TABLET BY MOUTH ONCE DAILY.            NOVOLOG FLEXPEN 100 UNIT/ML Sopn   Generic drug:  insulin aspart   INJECT 15 TO 30 UNITS THREE TIMES DAILY WITH MEALS AND AT BEDTIME

## (undated) NOTE — LETTER
[facility] [address], [address]  Consent for Procedure/Sedation    Date: 09/03/2020   Time: 1500      1. I authorize the performance upon Murphy Washington the following:  Cardioversion     2.  I authorize Dr. Anh Agarwal (and whomever is 7/19/1960       Medical Record #: [id number]

## (undated) NOTE — ED AVS SNAPSHOT
Dewey Trujillo   MRN: [id number]    Department:  [facility] Emergency Department   Date of Visit:  1/3/2018           Disclosure     Insurance plans vary and the physician(s) referred by the ER may not be covered by your plan.  Please contact your in tell this physician (or your personal doctor if your instructions are to return to your personal doctor) about any new or lasting problems. The primary care or specialist physician will see patients referred from the [facility] Emergency Department.  Nora Quinn

## (undated) NOTE — IP AVS SNAPSHOT
[address]            (For Outpatient Use Only) Initial Admit Date: 8/30/2020   Inpt/Obs Admit Date: Inpt: 8/30/20 / Obs: N/A   Discharge Date:    Zoltan Forrester:  [de-identified]   MRN: [de-identified]   CSN: [id number]   CEID: [id number] Subscriber Name:  Richard Chisholm :    Subscriber ID:  Pt Rel to Subscriber:    [place] [place] [facility] [place]    September 10, 2020

## (undated) NOTE — LETTER
[facility] [address], [address]  Consent for Procedure/Sedation    Date: 08/04/2020    Time: 1130      1.  I authorize the performance upon Allan Sewell the following:  transesophageal echocardiogram and direct current cardiovers Printed: 2020   11:23 AM  Patient Name: Dewey Trujillo        : 1960       Medical Record #: [id number]

## (undated) NOTE — IP AVS SNAPSHOT
Patient Demographics     Address  [place] [facility] [address] Phone  [phone] [facility])  [phone] (Mobile) *Preferred* E-mail Address  [email or web address]      Emergency Contact(s)     Name Relation Home Work [address]. Next dose due:  09/11/20      Take 1 capsule (60 mg total) by mouth once daily.    Anant Christopher MD         furosemide 40 MG Tabs  Commonly known as:  LASIX  Start taking on:  September 11, 2020  Next dose due:  09/11/20      Take 1 tablet (40 mg total) by These medications were sent to [address], [address] [facility]  [address] [facility], [phone]  [address] [place] [address]    Phone:  [phone]   cyclobenzaprine 10 MG Tabs     Please  your prescriptions at the location [facility]hay [id number] Insulin Aspart Pen (NOVOLOG) 100 UNIT/ML flexpen 2-10 Units 09/09/20 1746 Given            LEFT UPPER ARM     Order ID Medication Name Action Time Action Reason Comments    [id number] Insulin Aspart Pen (NOVOLOG) 100 UNIT/ML flexpen 2-10 Units 09 Sodium 139 136 - 145 mmol/L — [facility])   Potassium — — — [facility])   Comment:  Specimen hemolyzed. Specimen needs to be recollected.      Chloride 107 98 - 112 mmol/L — [facility])   CO2 30.0 21.0 - 32.0 mmol/L — [facility])   Midge Pretty Blood Culture Result No Growth 5 Days    Blood Culture FREQ X 2 [[id number]]  (Abnormal) Collected:  08/30/20 0353    Order Status:  Completed Lab Status:  Final result Updated:  09/05/20 0831    Specimen:  Blood,peripheral      Blood Culture Result Strep :   Silas Florez MD (Physician)    Related Notes:  Original Note by Silas Florez MD (Physician) filed at 8/30/2020  4:05 PM       General Medicine H&P     Patient presents with:  Lower Extremity Injury       PCP: Justin Montelongo MD    H Type 2 DM      Past Surgical History:   Procedure Laterality Date   • OTHER SURGICAL HISTORY      Root canals        ALL:    Erythromycin                Comment:Pt states felt \"tingling & weird\"     Insulin Aspart Pen, 2-10 Units, TID CC and HS  [START  08/30/2020    CO2 26.0 08/30/2020     08/30/2020    CA 8.6 08/30/2020    ALB 3.1 08/30/2020    ALKPHO 147 08/30/2020    BILT 0.7 08/30/2020    TP 7.6 08/30/2020    AST 24 08/30/2020    ALT 15 08/30/2020    TROP <0.045 08/30/2020    CK 85 08/ Surrounding soft tissue prominence. Lateral projection sub optimal and suprapatellar joint effusion cannot be excluded. CONCLUSION:  1. Progression of osteoarthritis when compared to most recent left knee radiographs dated 10/26/2011. ELAINE CAIN presents after falling out of bed    FINDINGS:  Preliminary reading provided by radiologist from [address] [place] [address] Radiology. VENTRICLES/SULCI:   There is cerebral atrophy, with prominence of the ventricles and sulci.   INTRACRANIAL:  There are no abnormal extraaxial -levemir 52u    # CAMI  -protocol, cpap    # Obesity  -encourage diet/exercise    # LE wounds, chronic  # LE cellulitis  -wound care   -IV abx as above     # LE knee pain  -likely OA from excess weight  -prn norco, gabapentin, flexeril added x1[CY.1], repor LE wounds.  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 No right pleural effusion or pneumothorax.   Left lower lobe retrocardiac region is limited and increased opacity within this region or small left pleural effusion bilateral knee pain/numbness  PATIENT STATED HISTORY: (As transcribed by Technologist)  Bilateral knee pain and numbness. FINDINGS:  BONES:  Osseous structures are intact.   Tricompartmental joint space narrowing with marginal osteophytes consistent with CONCLUSION:  Chronic brain changes are present. No acute process seen.    Dictated by (CST): Mal Pickering MD on 8/30/2020 at 9:42 AM     Finalized by (CST): Mal Pickering MD on 8/30/2020 at 9:43 AM            ASSESSMENT / PLAN:    63year old male wit Electronically signed by Tammy Tuttle MD on 8/30/2020  4:05 PM   Attribution Strong    CY. 1 - Tammy Tuttle MD on 8/30/2020  2:34 PM  CY. 2 - Tammy Tuttle MD on 8/30/2020  4:05 PM               H&P signed by Tammy Tuttle MD at 8/ Was hospitalized due to dehydration and had acute renal insufficiency (creatinine 1.85)   • Scalp laceration Dx in 3/2012    He hit his head at work and laceration required 3 staples.    • Shingles    • Shortness of breath    • Shoulder fracture, left (230.1 kg)   SpO2 98%   BMI 70.75 kg/m²     Gen: NAD, A+O x 3, obese, on 4L  Neck: supple, no LAD  CV: rrr, +s1/s2, no murmors  Lungs: dec b/l  Abd: s/nt/nd, +bs  LE: 2+ edema, wraps in place  Neuro: CN 2-12 intact, no focal deficits      LABS:   Lab Resul Result Date: 8/30/2020  PROCEDURE:  XR KNEE (1 OR 2 VIEWS), LEFT (CPT=73560)  COMPARISON:  [facility] , XR, KNEE (AP   LATERAL), LEFT XRAY, 10/26/2011, 12:24 PM.  INDICATIONS:  bilateral knee pain/numbness  PATIENT STATED HISTORY: (As transcribed by [address] Brain Or Head ([id number])    Result Date: 8/30/2020  PROCEDURE:  CT BRAIN OR HEAD (17606)  COMPARISON:  None. INDICATIONS:  bilateral knee pain/numbness  TECHNIQUE:  Noncontrast CT scanning is performed through the brain.  Dose reduction techniques were use -got lasix x1, BNP 6k  -difficult as got fluid for hypoTN  -IV CTX/doxy  -urine strep/legionella, sputum cx    # Acute renal failure  -likely pre-renal but difficult fluid balance, will ask renal for assistance   -Cr 1.94, was 1.13 8/3/20  -urine lytes/ult Strep septic shock with blood stream infection    History of Present Illness:    Jamie Meléndez is a a(n) 63year old male with h/o morbid obesity, CAMI on CPAP and Aflutter, admitted yesterday after a fall at home.  Pt was not able to get up sp EMS was called • Type II or unspecified type diabetes mellitus without mention of complication, uncontrolled Dx at age 35    Type 2 DM     Past Surgical History:   Procedure Laterality Date   • OTHER SURGICAL HISTORY      Root canals     Family History   Problem Relation tablet, Oral, Q6H PRN  •  DULoxetine HCl (CYMBALTA) DR particles cap 60 mg, 60 mg, Oral, Daily  •  insulin detemir (LEVEMIR) 100 UNIT/ML flextouch 52 Units, 52 Units, Subcutaneous, Daily  •  atorvastatin (LIPITOR) tab 20 mg, 20 mg, Oral, Nightly  •  Doxycy (37.4 °C), temperature source Temporal, resp. rate 16, height 5' 11\" (1.803 m), weight (!) 507 lb 4.5 oz (230.1 kg), SpO2 96 %. HEENT: no scleral icterus or conjunctival injection. Moist mucous membranes. No thrush  Neck: No lymphadenopathy. Supple.   Re Reviewed in EMR,     Radiology: Xr Chest Ap/pa (1 View) (cpt=71045)    Result Date: 8/30/2020  PROCEDURE:  XR CHEST AP/PA (1 VIEW) (CPT=71045)  TECHNIQUE:  AP chest radiograph was obtained.   COMPARISON:  [facility] , XR, XR CHEST AP PORTABLE  (CPT=71010), 10 findings with preliminary radiology report from [facility].     Dictated by (CST): Tara Fox MD on 8/30/2020 at 7:44 AM     Finalized by (CST): Tara Fox MD on 8/30/2020 at 7:45 AM       Xr Knee (1 Or 2 Views), Right (cpt=73560)    Result Date: 8/ INTRACRANIAL:  There are no abnormal extraaxial fluid collections. There is no midline shift. There are no acute appearing intraparenchymal brain abnormalities. There is nothing specific for acute territorial infarct.   There is no hemorrhage or mass les Discharge Summary signed by Yaneli Gonzales DO at 9/10/2020  1:25 PM  Version 1 of 1    Author:  Yaneli Gonzales DO Service:  Hospitalist Author Type:  Physician    Filed:  9/10/2020  1:25 PM Date of Service:  9/10/2020  1:21 PM Status:  Signed    Edit # Hypotension 2/2 septic shock (shock resolved), in setting of strep bacteremia  - ACE on hold, hypotension improved.  Was on pressors previously this hospitalization when in ICU  - blood cxs c GPC in chains-->Strep G, repeat negative  - apprec ID recs, IV # LE knee pain, neuropathy   - likely OA from excess weight; XRs confirmed   - prn norco, gabapentin, flexeril added    - on gabapentin     # Depression/anxiety  - SSRI     Consults: IP CONSULT TO HOSPITALIST  IP CONSULT TO CARDIOLOGY  IP CONSULT TO [address] [place] [address] !! Glucose Blood In Vitro Strip  To test up to 4 times daily.     Diclofenac Sodium 1 % Transdermal Gel  apply 4 grams to both knees 4 times daily as needed    Insulin Pen Needle (BD PEN NEEDLE MINI U/F) 31G X 5 MM Does not apply Misc  Use twice daily    !! Pt is 63year old male admitted on 8/30/2020 from home with c/o syncope and fall down onto his knees.  Pt diagnosed with a-fib/flutter w/RVR; respiratory failure; septic shock suspected from BLE cellulitis; mild volume overload.  Pt is s/p neg rapid covid • Scalp laceration Dx in 3/2012    He hit his head at work and laceration required 3 staples.    • Shingles    • Shortness of breath    • Shoulder fracture, left     Hairline   • Sleep apnea    • Sleep apnea, obstructive on CPAP    HTR as of 4-16-12   • Typ Standardized Score (AM-PAC Scale): 26.42   CMS Modifier (G-Code): CM    FUNCTIONAL ABILITY STATUS  Gait Assessment   Gait Assistance: Not tested  Distance (ft): n/a  Assistive Device: Other (Comment)(bariatric RW)     Stoop/Curb Assistance: Not tested  Com and use of bariatric bedrail this date. Pt will cont to benefit from skilled IP PT services in order to maximize function. Rec DC to [facility] when medically appropriate. DISCHARGE RECOMMENDATIONS  PT Discharge Recommendations: Sub-acute rehabilitation; Other respiratory failure; septic shock suspected from BLE cellulitis; mild volume overload.  Pt is s/p neg rapid covid test on 8/30/20.      Therapy significant imaging (date, test, result):  8/30/20 Brain CT = neg for acute process.    8/30/20 R knee xray = pro Hairline   • Sleep apnea    • Sleep apnea, obstructive on CPAP    HTR as of 4-16-12   • Type II or unspecified type diabetes mellitus without mention of complication, uncontrolled Dx at age 35    Type 2 DM       Past Surgical History  Past Surgical Histor Instructed in BUE AROM ex; facilitated trunk rotation, crossing midline, overhead reaching while in bed   Supine to side lying left and right: mod A of 1  Instructed nursing team in safe use of zulma lift to transfer patient to bariatric recliner chair. Patient will be independent with bilateral AROM HEP (home exercise program). [AO.1]       Attribution Strong    AO. 1 - Amado Chandra OT on 9/8/2020  5:07 PM                     Video Swallow Study Notes    No notes of this type exist for this encounter.

## (undated) NOTE — LETTER
1. I authorize the performance upon Gabriella Heavenly the following:  Direct current cardioversion    2. I authorize Dr. Adams oDn (and whomever is designated as the doctor’s assistant), to perform the above mentioned procedures.     3. If any unforeseen conditions a

## (undated) NOTE — LETTER
1. I authorize the performance upon Murphy Washington the following:  Transesophageal Echocardiogram    2. I authorize Dr. Bryant Jaramillo (and whomever is designated as the doctor’s assistant), to perform the above mentioned procedures.     3. If any unforeseen conditions